# Patient Record
Sex: FEMALE | Race: BLACK OR AFRICAN AMERICAN | NOT HISPANIC OR LATINO | Employment: OTHER | URBAN - METROPOLITAN AREA
[De-identification: names, ages, dates, MRNs, and addresses within clinical notes are randomized per-mention and may not be internally consistent; named-entity substitution may affect disease eponyms.]

---

## 2017-01-23 DIAGNOSIS — M25.462 BILATERAL KNEE SWELLING: ICD-10-CM

## 2017-01-23 DIAGNOSIS — M1A.9XX0 CHRONIC GOUT WITHOUT TOPHUS, UNSPECIFIED CAUSE, UNSPECIFIED SITE: ICD-10-CM

## 2017-01-23 DIAGNOSIS — M25.461 BILATERAL KNEE SWELLING: ICD-10-CM

## 2017-01-23 RX ORDER — MELOXICAM 7.5 MG/1
TABLET ORAL
Qty: 30 TABLET | Refills: 0 | Status: SHIPPED | OUTPATIENT
Start: 2017-01-23 | End: 2017-04-19 | Stop reason: SDUPTHER

## 2017-01-23 RX ORDER — ALLOPURINOL 100 MG/1
TABLET ORAL
Qty: 30 TABLET | Refills: 0 | Status: SHIPPED | OUTPATIENT
Start: 2017-01-23 | End: 2017-02-16 | Stop reason: SDUPTHER

## 2017-02-16 ENCOUNTER — LAB VISIT (OUTPATIENT)
Dept: LAB | Facility: HOSPITAL | Age: 82
End: 2017-02-16
Attending: FAMILY MEDICINE
Payer: MEDICARE

## 2017-02-16 ENCOUNTER — OFFICE VISIT (OUTPATIENT)
Dept: INTERNAL MEDICINE | Facility: CLINIC | Age: 82
End: 2017-02-16
Payer: MEDICARE

## 2017-02-16 VITALS
DIASTOLIC BLOOD PRESSURE: 88 MMHG | HEIGHT: 64 IN | TEMPERATURE: 97 F | HEART RATE: 77 BPM | OXYGEN SATURATION: 97 % | SYSTOLIC BLOOD PRESSURE: 142 MMHG | BODY MASS INDEX: 33.88 KG/M2 | WEIGHT: 198.44 LBS

## 2017-02-16 DIAGNOSIS — E66.9 OBESITY (BMI 30.0-34.9): ICD-10-CM

## 2017-02-16 DIAGNOSIS — M1A.9XX0 CHRONIC GOUT WITHOUT TOPHUS, UNSPECIFIED CAUSE, UNSPECIFIED SITE: ICD-10-CM

## 2017-02-16 DIAGNOSIS — M17.0 PRIMARY OSTEOARTHRITIS OF BOTH KNEES: ICD-10-CM

## 2017-02-16 DIAGNOSIS — M81.0 OSTEOPOROSIS: ICD-10-CM

## 2017-02-16 DIAGNOSIS — Z85.41 HISTORY OF CERVICAL CANCER: ICD-10-CM

## 2017-02-16 DIAGNOSIS — I10 ESSENTIAL HYPERTENSION: Primary | ICD-10-CM

## 2017-02-16 DIAGNOSIS — J30.1 SEASONAL ALLERGIC RHINITIS DUE TO POLLEN: ICD-10-CM

## 2017-02-16 DIAGNOSIS — I10 ESSENTIAL HYPERTENSION: ICD-10-CM

## 2017-02-16 LAB
ALBUMIN SERPL BCP-MCNC: 3.3 G/DL
ALP SERPL-CCNC: 63 U/L
ALT SERPL W/O P-5'-P-CCNC: 19 U/L
ANION GAP SERPL CALC-SCNC: 6 MMOL/L
AST SERPL-CCNC: 28 U/L
BILIRUB SERPL-MCNC: 0.4 MG/DL
BUN SERPL-MCNC: 24 MG/DL
CALCIUM SERPL-MCNC: 9.1 MG/DL
CHLORIDE SERPL-SCNC: 107 MMOL/L
CHOLEST/HDLC SERPL: 2.6 {RATIO}
CO2 SERPL-SCNC: 29 MMOL/L
CREAT SERPL-MCNC: 1.1 MG/DL
EST. GFR  (AFRICAN AMERICAN): 53.7 ML/MIN/1.73 M^2
EST. GFR  (NON AFRICAN AMERICAN): 46.5 ML/MIN/1.73 M^2
GLUCOSE SERPL-MCNC: 112 MG/DL
HDL/CHOLESTEROL RATIO: 37.9 %
HDLC SERPL-MCNC: 182 MG/DL
HDLC SERPL-MCNC: 69 MG/DL
LDLC SERPL CALC-MCNC: 98 MG/DL
NONHDLC SERPL-MCNC: 113 MG/DL
POTASSIUM SERPL-SCNC: 3.9 MMOL/L
PROT SERPL-MCNC: 6.9 G/DL
SODIUM SERPL-SCNC: 142 MMOL/L
TRIGL SERPL-MCNC: 75 MG/DL
TSH SERPL DL<=0.005 MIU/L-ACNC: 1.31 UIU/ML
URATE SERPL-MCNC: 6 MG/DL

## 2017-02-16 PROCEDURE — 36415 COLL VENOUS BLD VENIPUNCTURE: CPT | Mod: PO

## 2017-02-16 PROCEDURE — 80061 LIPID PANEL: CPT

## 2017-02-16 PROCEDURE — 99214 OFFICE O/P EST MOD 30 MIN: CPT | Mod: S$PBB,,, | Performed by: FAMILY MEDICINE

## 2017-02-16 PROCEDURE — 80053 COMPREHEN METABOLIC PANEL: CPT

## 2017-02-16 PROCEDURE — 99999 PR PBB SHADOW E&M-EST. PATIENT-LVL III: CPT | Mod: PBBFAC,,, | Performed by: FAMILY MEDICINE

## 2017-02-16 PROCEDURE — 84443 ASSAY THYROID STIM HORMONE: CPT

## 2017-02-16 PROCEDURE — 84550 ASSAY OF BLOOD/URIC ACID: CPT

## 2017-02-16 RX ORDER — CETIRIZINE HYDROCHLORIDE 10 MG/1
10 TABLET ORAL DAILY PRN
Qty: 30 TABLET | Refills: 0 | Status: SHIPPED | OUTPATIENT
Start: 2017-02-16 | End: 2017-03-20 | Stop reason: SDUPTHER

## 2017-02-16 RX ORDER — ALLOPURINOL 100 MG/1
100 TABLET ORAL DAILY
Qty: 90 TABLET | Refills: 1 | Status: SHIPPED | OUTPATIENT
Start: 2017-02-16 | End: 2017-07-27 | Stop reason: SDUPTHER

## 2017-02-16 RX ORDER — LISINOPRIL AND HYDROCHLOROTHIAZIDE 12.5; 2 MG/1; MG/1
1 TABLET ORAL DAILY
Qty: 30 TABLET | Refills: 1 | Status: SHIPPED | OUTPATIENT
Start: 2017-02-16 | End: 2017-04-18 | Stop reason: SDUPTHER

## 2017-02-16 NOTE — PROGRESS NOTES
"Subjective:       Patient ID: Henna Jorge is a 83 y.o. female.    Chief Complaint: Medication Refill and Annual Exam    HPI Comments: 83-year-old female patient accompanied by her daughter with Patient Active Problem List:     Hypertension     Osteoporosis     Gout, chronic     Primary osteoarthritis of both knees     History of cervical cancer  Here for refill on her medications, and for follow-up on chronic medical conditions.  Patient denies of any gout exacerbations, requesting refill on allopurinol today.   Patient did not take her blood pressure medication this morning, reported that she was rushing in.   Denies of any chest pain or shortness of breath, has been having mild headache.  Secondary to osteoarthritis has been having knee stiffness.         Medication Refill   Associated symptoms include arthralgias and headaches. Pertinent negatives include no abdominal pain, chest pain, fatigue, myalgias, nausea, rash or vomiting.     Review of Systems   Constitutional: Negative for fatigue.   Eyes: Negative for visual disturbance.   Respiratory: Negative for shortness of breath.    Cardiovascular: Negative for chest pain and leg swelling.   Gastrointestinal: Negative for abdominal pain, nausea and vomiting.   Musculoskeletal: Positive for arthralgias. Negative for myalgias.   Skin: Negative for rash.   Neurological: Positive for headaches. Negative for light-headedness.   Psychiatric/Behavioral: Negative for sleep disturbance.         Visit Vitals    BP (!) 142/88    Pulse 77    Temp 97.2 °F (36.2 °C) (Tympanic)    Ht 5' 4" (1.626 m)    Wt 90 kg (198 lb 6.6 oz)    SpO2 97%    BMI 34.06 kg/m2     Objective:      Physical Exam   Constitutional: She is oriented to person, place, and time. She appears well-developed and well-nourished.   HENT:   Head: Normocephalic and atraumatic.   Mouth/Throat: Oropharynx is clear and moist.   Cardiovascular: Normal rate, regular rhythm and normal heart sounds.    No " murmur heard.  Pulmonary/Chest: Effort normal and breath sounds normal. She has no wheezes.   Abdominal: Soft. Bowel sounds are normal. There is no tenderness.   Musculoskeletal: She exhibits tenderness. She exhibits no edema.   Positive for bilateral knee tenderness more on the right side with stiffness   Neurological: She is alert and oriented to person, place, and time.   Skin: Skin is warm and dry. No rash noted.   Psychiatric: She has a normal mood and affect.         Assessment:       1. Essential hypertension    2. Chronic gout without tophus, unspecified cause, unspecified site    3. Osteoporosis    4. Primary osteoarthritis of both knees    5. History of cervical cancer    6. Seasonal allergic rhinitis due to pollen    7. Obesity (BMI 30.0-34.9)        Plan:   Essential hypertension  -     lisinopril-hydrochlorothiazide (PRINZIDE,ZESTORETIC) 20-12.5 mg per tablet; Take 1 tablet by mouth once daily.  Dispense: 30 tablet; Refill: 1  -     Comprehensive metabolic panel; Future; Expected date: 2/16/17  -     Lipid panel; Future; Expected date: 2/16/17  -     TSH; Future; Expected date: 2/16/17  Repeat blood pressure improved but still elevated, will start on lisinopril hydrochlorothiazide 20/12.5, will discontinue lisinopril 20 mg.   Follow-up in 2 weeks for blood pressure check and to discuss about test results    Chronic gout without tophus, unspecified cause, unspecified site  -     Uric acid; Future; Expected date: 2/16/17  -     allopurinol (ZYLOPRIM) 100 MG tablet; Take 1 tablet (100 mg total) by mouth once daily.  Dispense: 90 tablet; Refill: 1  Stable on allopurinol 100 mg, refill given today    Osteoporosis-currently on Fosamax and takes calcium with vitamin D supplements    Primary osteoarthritis of both knees-stable, takes meloxicam only as needed  Graded exercise regimen recommended    History of cervical cancer    Seasonal allergic rhinitis due to pollen  -     cetirizine (ZYRTEC) 10 MG tablet;  Take 1 tablet (10 mg total) by mouth daily as needed for Allergies.  Dispense: 30 tablet; Refill: 0  Refill given on Zyrtec    Obesity (BMI 30.0-34.9)-advised to work on lifestyle modifications with diet and exercise to lower weight of BMI 34

## 2017-02-16 NOTE — MR AVS SNAPSHOT
[facility] Internal Medicine  [address]  [place] [address]  Phone: [phone]  Fax: [phone]                  Henna Jorge   2017 11:20 AM   Office Visit    Description:  Female : 3/20/1933   Provider:  Mary Corbett MD   Department:  [facility] - Internal Medicine           Reason for Visit     Medication Refill     Annual Exam           Diagnoses this Visit        Comments    Essential hypertension    -  Primary     Chronic gout without tophus, unspecified cause, unspecified site         Osteoporosis         Primary osteoarthritis of both knees         History of cervical cancer         Seasonal allergic rhinitis due to pollen         Obesity (BMI 30.0-34.9)                To Do List           Future Appointments        Provider Department Dept Phone    2017 1:15 PM LAB, SAME DAY [facility] [phone]    3/2/2017 11:00 AM Mary Corbett MD [facility] Internal Medicine [phone]      Goals (5 Years of Data)     None      Follow-Up and Disposition     Return in about 2 weeks (around 3/2/2017), or if symptoms worsen or fail to improve.       These Medications        Disp Refills Start End    lisinopril-hydrochlorothiazide (PRINZIDE,ZESTORETIC) 20-12.5 mg per tablet 30 tablet 1 2017    Take 1 tablet by mouth once daily. - Oral    Pharmacy: [facility] [address] [place] [address] [address] AT [facility] Ph #: [phone]       cetirizine (ZYRTEC) 10 MG tablet 30 tablet 0 2017    Take 1 tablet (10 mg total) by mouth daily as needed for Allergies. - Oral    [facility] [address] [place] [address] [place] - [address] AT [facility] Ph #: [phone]       allopurinol (ZYLOPRIM) 100 MG tablet 90 tablet 1 2017     Take 1 tablet (100 mg total) by mouth once daily. - Oral    Pharmacy: [facility] [address] [place] [address] [place] - [address] AT [facility]  [address] Sherman  #: [phone]         [facility] On Call     [facility] On Call Nurse Care Line - 24/7 Assistance  Registered nurses in the [facility] On Call Center provide clinical advisement, health education, appointment booking, and other advisory services.  Call for this free service at [phone].             Medications           Message regarding Medications     Verify the changes and/or additions to your medication regime listed below are the same as discussed with your clinician today.  If any of these changes or additions are incorrect, please notify your healthcare provider.        START taking these NEW medications        Refills    lisinopril-hydrochlorothiazide (PRINZIDE,ZESTORETIC) 20-12.5 mg per tablet 1    Sig: Take 1 tablet by mouth once daily.    Class: Normal    Route: Oral    cetirizine (ZYRTEC) 10 MG tablet 0    Sig: Take 1 tablet (10 mg total) by mouth daily as needed for Allergies.    Class: Normal    Route: Oral      CHANGE how you are taking these medications     Start Taking Instead of    allopurinol (ZYLOPRIM) 100 MG tablet allopurinol (ZYLOPRIM) 100 MG tablet    Dosage:  Take 1 tablet (100 mg total) by mouth once daily. Dosage:  TAKE 1 TABLET BY MOUTH EVERY DAY    Reason for Change:  Reorder       STOP taking these medications     lisinopril (PRINIVIL,ZESTRIL) 20 MG tablet Take 1 tablet (20 mg total) by mouth once daily.           Verify that the below list of medications is an accurate representation of the medications you are currently taking.  If none reported, the list may be blank. If incorrect, please contact your healthcare provider. Carry this list with you in case of emergency.           Current Medications     alendronate (FOSAMAX) 70 MG tablet TAKE 1 TABLET BY MOUTH EVERY WEEK    allopurinol (ZYLOPRIM) 100 MG tablet Take 1 tablet (100 mg total) by mouth once daily.    benzoyl peroxide (BP WASH) 10 % external wash Use once daily as face wash.  Rinse completely.  May  "bleach clothing    cholecalciferol, vitamin D3, 2,000 unit Tab Take 1 tablet by mouth Daily. 1 tablet Oral Every day    dorzolamide-timolol 2-0.5% (COSOPT) 2-0.5 % ophthalmic solution     TRAVATAN Z 0.004 % Drop     cetirizine (ZYRTEC) 10 MG tablet Take 1 tablet (10 mg total) by mouth daily as needed for Allergies.    COLCRYS 0.6 mg tablet TAKE 1 TABLET BY MOUTH TWICE DAILY AS NEEDED FOR GOUT ATTACK    lisinopril-hydrochlorothiazide (PRINZIDE,ZESTORETIC) 20-12.5 mg per tablet Take 1 tablet by mouth once daily.    meloxicam (MOBIC) 7.5 MG tablet TAKE 1 TABLET BY MOUTH EVERY DAY AS NEEDED FOR PAIN           Clinical Reference Information           Your Vitals Were     BP Pulse Temp Height Weight SpO2    142/88 77 97.2 °F (36.2 °C) (Tympanic) 5' 4" (1.626 m) 90 kg (198 lb 6.6 oz) 97%    BMI                34.06 kg/m2          Blood Pressure          Most Recent Value    BP  (!)  142/88      Allergies as of 2/16/2017     No Known Allergies      Immunizations Administered on Date of Encounter - 2/16/2017     None      Orders Placed During Today's Visit     Future Labs/Procedures Expected by Expires    Comprehensive metabolic panel  2/16/2017 4/17/2018    Lipid panel  2/16/2017 4/17/2018    TSH  2/16/2017 4/17/2018    Uric acid  2/16/2017 4/17/2018      [facility] Sign-Up     Activating your [facility] account is as easy as 1-2-3!     1) Visit [email or web address], select Sign Up Now, enter this activation code and your date of birth, then select Next.  [email or web address]  Expires: 4/2/2017 12:07 PM      2) Create a username and password to use when you visit [facility] in the future and select a security question in case you lose your password and select Next.    3) Enter your e-mail address and click Sign Up!    Additional Information  If you have questions, please e-mail [email or web address] or call [phone] to talk to our [facility] staff. Remember, [facility] is NOT to be used for urgent needs. For medical emergencies, dial " 911.         Language Assistance Services     ATTENTION: Language assistance services are available, free of charge. Please call [phone].      ATENCIÓN: Si habla azam, tiene a turner disposición servicios gratuitos de asistencia lingüística. Llame al [phone].     CHÚ Ý: N?u b?n nói Ti?ng Vi?t, có các d?ch v? h? tr? ngôn ng? mi?n phí dành cho b?n. G?i s? [phone].         [facility] - Internal Medicine complies with applicable Federal civil rights laws and does not discriminate on the basis of race, color, national origin, age, disability, or sex.

## 2017-03-17 RX ORDER — ALENDRONATE SODIUM 70 MG/1
TABLET ORAL
Qty: 4 TABLET | Refills: 4 | Status: SHIPPED | OUTPATIENT
Start: 2017-03-17 | End: 2017-08-25 | Stop reason: SDUPTHER

## 2017-03-20 DIAGNOSIS — J30.1 SEASONAL ALLERGIC RHINITIS DUE TO POLLEN: ICD-10-CM

## 2017-03-20 RX ORDER — CETIRIZINE HYDROCHLORIDE 10 MG/1
TABLET ORAL
Qty: 30 TABLET | Refills: 0 | Status: SHIPPED | OUTPATIENT
Start: 2017-03-20 | End: 2017-04-19 | Stop reason: SDUPTHER

## 2017-04-18 DIAGNOSIS — I10 ESSENTIAL HYPERTENSION: ICD-10-CM

## 2017-04-19 DIAGNOSIS — M25.462 BILATERAL KNEE SWELLING: ICD-10-CM

## 2017-04-19 DIAGNOSIS — M25.461 BILATERAL KNEE SWELLING: ICD-10-CM

## 2017-04-19 DIAGNOSIS — I10 ESSENTIAL HYPERTENSION: ICD-10-CM

## 2017-04-19 DIAGNOSIS — J30.1 SEASONAL ALLERGIC RHINITIS DUE TO POLLEN: ICD-10-CM

## 2017-04-19 RX ORDER — LISINOPRIL AND HYDROCHLOROTHIAZIDE 12.5; 2 MG/1; MG/1
TABLET ORAL
Qty: 30 TABLET | Refills: 4 | Status: SHIPPED | OUTPATIENT
Start: 2017-04-19 | End: 2017-04-19 | Stop reason: SDUPTHER

## 2017-04-19 RX ORDER — CETIRIZINE HYDROCHLORIDE 10 MG/1
10 TABLET ORAL DAILY
Qty: 90 TABLET | Refills: 0 | Status: SHIPPED | OUTPATIENT
Start: 2017-04-19 | End: 2018-02-20 | Stop reason: SDUPTHER

## 2017-04-19 RX ORDER — MELOXICAM 7.5 MG/1
7.5 TABLET ORAL DAILY PRN
Qty: 90 TABLET | Refills: 3 | Status: SHIPPED | OUTPATIENT
Start: 2017-04-19 | End: 2018-02-20 | Stop reason: SDUPTHER

## 2017-04-19 RX ORDER — MELOXICAM 7.5 MG/1
TABLET ORAL
Qty: 30 TABLET | Refills: 0 | Status: SHIPPED | OUTPATIENT
Start: 2017-04-19 | End: 2017-04-19 | Stop reason: SDUPTHER

## 2017-04-19 RX ORDER — LISINOPRIL AND HYDROCHLOROTHIAZIDE 12.5; 2 MG/1; MG/1
1 TABLET ORAL DAILY
Qty: 90 TABLET | Refills: 4 | Status: SHIPPED | OUTPATIENT
Start: 2017-04-19 | End: 2018-02-20 | Stop reason: SDUPTHER

## 2017-04-19 RX ORDER — CETIRIZINE HYDROCHLORIDE 10 MG/1
TABLET ORAL
Qty: 30 TABLET | Refills: 0 | Status: SHIPPED | OUTPATIENT
Start: 2017-04-19 | End: 2017-04-19 | Stop reason: SDUPTHER

## 2017-04-19 NOTE — TELEPHONE ENCOUNTER
----- Message from Holley Ledezma sent at 4/19/2017 12:12 PM CDT -----  Contact: daughter/trina Corona states that pt need a refill on CETRIZINE, MELOXICAM, & LISINIPOL . Caller also  states that she would like to get a 90 day supply. .. [phone]        .  [facility] [address] [place] [address] [place] - [address] [facility] [place] [address] [place]  [facility] [place] [address]  Phone: [phone] Fax: [phone]

## 2017-07-27 DIAGNOSIS — M1A.9XX0 CHRONIC GOUT WITHOUT TOPHUS, UNSPECIFIED CAUSE, UNSPECIFIED SITE: ICD-10-CM

## 2017-07-27 RX ORDER — ALLOPURINOL 100 MG/1
TABLET ORAL
Qty: 90 TABLET | Refills: 0 | Status: SHIPPED | OUTPATIENT
Start: 2017-07-27 | End: 2017-11-02 | Stop reason: SDUPTHER

## 2017-08-25 RX ORDER — ALENDRONATE SODIUM 70 MG/1
TABLET ORAL
Qty: 4 TABLET | Refills: 5 | Status: SHIPPED | OUTPATIENT
Start: 2017-08-25 | End: 2018-02-20 | Stop reason: SDUPTHER

## 2017-11-02 DIAGNOSIS — M1A.9XX0 CHRONIC GOUT WITHOUT TOPHUS, UNSPECIFIED CAUSE, UNSPECIFIED SITE: ICD-10-CM

## 2017-11-02 RX ORDER — ALLOPURINOL 100 MG/1
TABLET ORAL
Qty: 90 TABLET | Refills: 0 | Status: SHIPPED | OUTPATIENT
Start: 2017-11-02 | End: 2018-01-29 | Stop reason: SDUPTHER

## 2018-01-29 DIAGNOSIS — M1A.9XX0 CHRONIC GOUT WITHOUT TOPHUS, UNSPECIFIED CAUSE, UNSPECIFIED SITE: ICD-10-CM

## 2018-01-29 RX ORDER — ALLOPURINOL 100 MG/1
TABLET ORAL
Qty: 30 TABLET | Refills: 0 | Status: SHIPPED | OUTPATIENT
Start: 2018-01-29 | End: 2018-02-20 | Stop reason: SDUPTHER

## 2018-02-20 ENCOUNTER — OFFICE VISIT (OUTPATIENT)
Dept: INTERNAL MEDICINE | Facility: CLINIC | Age: 83
End: 2018-02-20
Payer: MEDICARE

## 2018-02-20 ENCOUNTER — LAB VISIT (OUTPATIENT)
Dept: LAB | Facility: HOSPITAL | Age: 83
End: 2018-02-20
Attending: FAMILY MEDICINE
Payer: MEDICARE

## 2018-02-20 VITALS
BODY MASS INDEX: 33.91 KG/M2 | DIASTOLIC BLOOD PRESSURE: 76 MMHG | HEART RATE: 69 BPM | TEMPERATURE: 98 F | OXYGEN SATURATION: 98 % | WEIGHT: 198.63 LBS | SYSTOLIC BLOOD PRESSURE: 130 MMHG | HEIGHT: 64 IN

## 2018-02-20 DIAGNOSIS — I10 ESSENTIAL HYPERTENSION: ICD-10-CM

## 2018-02-20 DIAGNOSIS — I10 ESSENTIAL HYPERTENSION: Primary | ICD-10-CM

## 2018-02-20 DIAGNOSIS — N18.30 STAGE 3 CHRONIC KIDNEY DISEASE: ICD-10-CM

## 2018-02-20 DIAGNOSIS — M1A.00X0 IDIOPATHIC CHRONIC GOUT WITHOUT TOPHUS, UNSPECIFIED SITE: ICD-10-CM

## 2018-02-20 DIAGNOSIS — M17.0 PRIMARY OSTEOARTHRITIS OF BOTH KNEES: ICD-10-CM

## 2018-02-20 DIAGNOSIS — M81.0 OSTEOPOROSIS, UNSPECIFIED OSTEOPOROSIS TYPE, UNSPECIFIED PATHOLOGICAL FRACTURE PRESENCE: ICD-10-CM

## 2018-02-20 DIAGNOSIS — L60.0 INGROWN TOENAIL: ICD-10-CM

## 2018-02-20 DIAGNOSIS — Z85.41 HISTORY OF CERVICAL CANCER: ICD-10-CM

## 2018-02-20 DIAGNOSIS — B35.1 ONYCHOMYCOSIS: ICD-10-CM

## 2018-02-20 DIAGNOSIS — E55.9 VITAMIN D DEFICIENCY: Primary | ICD-10-CM

## 2018-02-20 DIAGNOSIS — E66.9 OBESITY (BMI 30.0-34.9): ICD-10-CM

## 2018-02-20 DIAGNOSIS — J30.1 CHRONIC SEASONAL ALLERGIC RHINITIS DUE TO POLLEN: ICD-10-CM

## 2018-02-20 PROBLEM — E66.811 OBESITY (BMI 30.0-34.9): Status: ACTIVE | Noted: 2018-02-20

## 2018-02-20 LAB
25(OH)D3+25(OH)D2 SERPL-MCNC: 24 NG/ML
ALBUMIN SERPL BCP-MCNC: 3.2 G/DL
ALP SERPL-CCNC: 63 U/L
ALT SERPL W/O P-5'-P-CCNC: 13 U/L
ANION GAP SERPL CALC-SCNC: 12 MMOL/L
AST SERPL-CCNC: 20 U/L
BASOPHILS # BLD AUTO: 0.03 K/UL
BASOPHILS NFR BLD: 0.4 %
BILIRUB SERPL-MCNC: 0.4 MG/DL
BUN SERPL-MCNC: 26 MG/DL
CALCIUM SERPL-MCNC: 9 MG/DL
CHLORIDE SERPL-SCNC: 109 MMOL/L
CHOLEST SERPL-MCNC: 190 MG/DL
CHOLEST/HDLC SERPL: 2.6 {RATIO}
CO2 SERPL-SCNC: 23 MMOL/L
CREAT SERPL-MCNC: 1.1 MG/DL
DIFFERENTIAL METHOD: ABNORMAL
EOSINOPHIL # BLD AUTO: 0.1 K/UL
EOSINOPHIL NFR BLD: 0.7 %
ERYTHROCYTE [DISTWIDTH] IN BLOOD BY AUTOMATED COUNT: 15.3 %
EST. GFR  (AFRICAN AMERICAN): 53.3 ML/MIN/1.73 M^2
EST. GFR  (NON AFRICAN AMERICAN): 46.2 ML/MIN/1.73 M^2
GLUCOSE SERPL-MCNC: 96 MG/DL
HCT VFR BLD AUTO: 37.7 %
HDLC SERPL-MCNC: 72 MG/DL
HDLC SERPL: 37.9 %
HGB BLD-MCNC: 12.2 G/DL
IMM GRANULOCYTES # BLD AUTO: 0.01 K/UL
IMM GRANULOCYTES NFR BLD AUTO: 0.1 %
LDLC SERPL CALC-MCNC: 103.8 MG/DL
LYMPHOCYTES # BLD AUTO: 2.2 K/UL
LYMPHOCYTES NFR BLD: 30.7 %
MCH RBC QN AUTO: 27.3 PG
MCHC RBC AUTO-ENTMCNC: 32.4 G/DL
MCV RBC AUTO: 84 FL
MONOCYTES # BLD AUTO: 0.6 K/UL
MONOCYTES NFR BLD: 8 %
NEUTROPHILS # BLD AUTO: 4.3 K/UL
NEUTROPHILS NFR BLD: 60.1 %
NONHDLC SERPL-MCNC: 118 MG/DL
NRBC BLD-RTO: 0 /100 WBC
PLATELET # BLD AUTO: 234 K/UL
PMV BLD AUTO: 10.9 FL
POTASSIUM SERPL-SCNC: 3.7 MMOL/L
PROT SERPL-MCNC: 6.9 G/DL
RBC # BLD AUTO: 4.47 M/UL
SODIUM SERPL-SCNC: 144 MMOL/L
TRIGL SERPL-MCNC: 71 MG/DL
TSH SERPL DL<=0.005 MIU/L-ACNC: 2.08 UIU/ML
URATE SERPL-MCNC: 7.3 MG/DL
WBC # BLD AUTO: 7.11 K/UL

## 2018-02-20 PROCEDURE — 84443 ASSAY THYROID STIM HORMONE: CPT

## 2018-02-20 PROCEDURE — 1159F MED LIST DOCD IN RCRD: CPT | Mod: ,,, | Performed by: FAMILY MEDICINE

## 2018-02-20 PROCEDURE — 36415 COLL VENOUS BLD VENIPUNCTURE: CPT | Mod: PO

## 2018-02-20 PROCEDURE — 1125F AMNT PAIN NOTED PAIN PRSNT: CPT | Mod: ,,, | Performed by: FAMILY MEDICINE

## 2018-02-20 PROCEDURE — 82306 VITAMIN D 25 HYDROXY: CPT

## 2018-02-20 PROCEDURE — 80053 COMPREHEN METABOLIC PANEL: CPT

## 2018-02-20 PROCEDURE — 99214 OFFICE O/P EST MOD 30 MIN: CPT | Mod: PBBFAC,PO | Performed by: FAMILY MEDICINE

## 2018-02-20 PROCEDURE — 80061 LIPID PANEL: CPT

## 2018-02-20 PROCEDURE — 99999 PR PBB SHADOW E&M-EST. PATIENT-LVL IV: CPT | Mod: PBBFAC,,, | Performed by: FAMILY MEDICINE

## 2018-02-20 PROCEDURE — 84550 ASSAY OF BLOOD/URIC ACID: CPT

## 2018-02-20 PROCEDURE — 85025 COMPLETE CBC W/AUTO DIFF WBC: CPT

## 2018-02-20 PROCEDURE — 99214 OFFICE O/P EST MOD 30 MIN: CPT | Mod: S$PBB,,, | Performed by: FAMILY MEDICINE

## 2018-02-20 RX ORDER — LISINOPRIL AND HYDROCHLOROTHIAZIDE 12.5; 2 MG/1; MG/1
1 TABLET ORAL DAILY
Qty: 90 TABLET | Refills: 4 | Status: SHIPPED | OUTPATIENT
Start: 2018-02-20 | End: 2019-03-09 | Stop reason: SDUPTHER

## 2018-02-20 RX ORDER — CETIRIZINE HYDROCHLORIDE 10 MG/1
10 TABLET ORAL DAILY
Qty: 90 TABLET | Refills: 0 | Status: SHIPPED | OUTPATIENT
Start: 2018-02-20 | End: 2018-04-30 | Stop reason: SDUPTHER

## 2018-02-20 RX ORDER — ALENDRONATE SODIUM 70 MG/1
70 TABLET ORAL
Qty: 4 TABLET | Refills: 11 | Status: SHIPPED | OUTPATIENT
Start: 2018-02-20 | End: 2019-03-09 | Stop reason: SDUPTHER

## 2018-02-20 RX ORDER — ERGOCALCIFEROL 1.25 MG/1
50000 CAPSULE ORAL
Qty: 10 CAPSULE | Refills: 0 | Status: SHIPPED | OUTPATIENT
Start: 2018-02-20 | End: 2018-04-30 | Stop reason: SDUPTHER

## 2018-02-20 RX ORDER — MELOXICAM 7.5 MG/1
7.5 TABLET ORAL DAILY PRN
Qty: 90 TABLET | Refills: 0 | Status: SHIPPED | OUTPATIENT
Start: 2018-02-20 | End: 2018-07-26 | Stop reason: SDUPTHER

## 2018-02-20 RX ORDER — ALLOPURINOL 100 MG/1
TABLET ORAL
Qty: 90 TABLET | Refills: 3 | Status: SHIPPED | OUTPATIENT
Start: 2018-02-20 | End: 2019-06-20 | Stop reason: SDUPTHER

## 2018-02-20 NOTE — PROGRESS NOTES
"Subjective:       Patient ID: Henna Jorge is a 84 y.o. female.    Chief Complaint: Follow-up (ingrown toenail)    84-year-old Afro-American female patient accompanied by her daughter with Patient  Active Problem List:     Hypertension     Osteoporosis     Gout, chronic     Primary osteoarthritis of both knees     History of cervical cancer     Obesity (BMI 30.0-34.9)     Stage 3 chronic kidney disease  Here for follow-up on chronic medical conditions.  Has been taking her medications regularly, requesting refills on her medicines.  Denies of any gout exacerbations.  Reports that she does not take Mobic regularly.   Has been having ingrown toenails and would like to see the specialist, not able to cut her toenails secondary to thickening.  Denies of any chest pain or headaches, vision disturbances.         Review of Systems   Constitutional: Negative for fatigue.   Eyes: Negative for visual disturbance.   Respiratory: Negative for shortness of breath.    Cardiovascular: Negative for chest pain and leg swelling.   Gastrointestinal: Negative for abdominal pain, nausea and vomiting.   Musculoskeletal: Negative for myalgias.   Skin: Positive for rash.   Neurological: Negative for light-headedness and headaches.   Psychiatric/Behavioral: Negative for sleep disturbance.         /76 (BP Location: Right arm, Patient Position: Sitting)   Pulse 69   Temp 97.6 °F (36.4 °C) (Tympanic)   Ht 5' 4" (1.626 m)   Wt 90.1 kg (198 lb 10.2 oz)   SpO2 98%   BMI 34.10 kg/m²   Objective:      Physical Exam   Constitutional: She is oriented to person, place, and time. She appears well-developed and well-nourished.   HENT:   Head: Normocephalic and atraumatic.   Mouth/Throat: Oropharynx is clear and moist.   Cardiovascular: Normal rate, regular rhythm and normal heart sounds.    No murmur heard.  Pulmonary/Chest: Effort normal and breath sounds normal. She has no wheezes.   Abdominal: Soft. Bowel sounds are normal. There is no " tenderness.   Musculoskeletal: She exhibits no edema.   Neurological: She is alert and oriented to person, place, and time.   Skin: Skin is warm and dry. Rash noted.   Positive for fungal infection to multiple toenails bilaterally, and bilateral ingrown toenails to the great toenails   Psychiatric: She has a normal mood and affect.         Assessment:       1. Essential hypertension    2. Idiopathic chronic gout without tophus, unspecified site    3. Primary osteoarthritis of both knees    4. History of cervical cancer    5. Osteoporosis, unspecified osteoporosis type, unspecified pathological fracture presence    6. Obesity (BMI 30.0-34.9)    7. Stage 3 chronic kidney disease    8. Onychomycosis    9. Ingrown toenail    10. Chronic seasonal allergic rhinitis due to pollen        Plan:   Essential hypertension  -     Comprehensive metabolic panel; Future; Expected date: 02/20/2018  -     Lipid panel; Future; Expected date: 02/20/2018  -     TSH; Future; Expected date: 02/20/2018  -     Urinalysis; Future; Expected date: 02/20/2018  -     Vitamin D; Future; Expected date: 02/20/2018  -     lisinopril-hydrochlorothiazide (PRINZIDE,ZESTORETIC) 20-12.5 mg per tablet; Take 1 tablet by mouth once daily.  Dispense: 90 tablet; Refill: 4  Blood pressure stable today currently on lisinopril hydrochlorothiazide 20/12.5 mg daily  Restrict salt intake and eat low-fat and low-cholesterol diet    Idiopathic chronic gout without tophus, unspecified site  -     Uric acid; Future; Expected date: 02/20/2018  -     allopurinol (ZYLOPRIM) 100 MG tablet; TAKE 1 TABLET(100 MG) BY MOUTH EVERY DAY  Dispense: 90 tablet; Refill: 3  Stable on allopurinol 100 mg daily, has been taking Colcrys for exacerbations as needed    Primary osteoarthritis of both knees  -     meloxicam (MOBIC) 7.5 MG tablet; Take 1 tablet (7.5 mg total) by mouth daily as needed.  Dispense: 90 tablet; Refill: 0  Encouraged to use Mobic only as needed    History of  cervical cancer-status post complete hysterectomy    Osteoporosis, unspecified osteoporosis type, unspecified pathological fracture presence  -     Vitamin D; Future; Expected date: 02/20/2018  -     alendronate (FOSAMAX) 70 MG tablet; Take 1 tablet (70 mg total) by mouth every 7 days.  Dispense: 4 tablet; Refill: 11  -     DXA Bone Density Spine And Hip; Future; Expected date: 07/20/2018  Patient currently taking Fosamax weekly and calcium with vitamin D supplements daily  Due for DEXA scan in July    Obesity (BMI 30.0-34.9)-lifestyle modifications recommended to lose weight with BMI 34    Stage 3 chronic kidney disease  -     CBC auto differential; Future; Expected date: 02/20/2018  Encouraged to drink adequate fluids and avoid over-the-counter NSAIDs    Onychomycosis  -     Ambulatory referral to Podiatry  Ingrown toenail  -     Ambulatory referral to Podiatry  Will refer to podiatry  Advised to use Vicks cosmo/ over-the-counter Tinactin cream/Lamisil as needed for fungal rash    Chronic seasonal allergic rhinitis due to pollen  -     cetirizine (ZYRTEC) 10 MG tablet; Take 1 tablet (10 mg total) by mouth once daily.  Dispense: 90 tablet; Refill: 0  Refill given on Zyrtec

## 2018-03-01 ENCOUNTER — TELEPHONE (OUTPATIENT)
Dept: INTERNAL MEDICINE | Facility: CLINIC | Age: 83
End: 2018-03-01

## 2018-03-06 ENCOUNTER — OFFICE VISIT (OUTPATIENT)
Dept: PODIATRY | Facility: CLINIC | Age: 83
End: 2018-03-06
Payer: MEDICARE

## 2018-03-06 VITALS
DIASTOLIC BLOOD PRESSURE: 86 MMHG | WEIGHT: 194.44 LBS | SYSTOLIC BLOOD PRESSURE: 136 MMHG | BODY MASS INDEX: 33.2 KG/M2 | HEART RATE: 91 BPM | HEIGHT: 64 IN

## 2018-03-06 DIAGNOSIS — L60.0 ONYCHOCRYPTOSIS: Primary | ICD-10-CM

## 2018-03-06 DIAGNOSIS — B35.1 DERMATOPHYTOSIS OF NAIL: ICD-10-CM

## 2018-03-06 PROCEDURE — 99999 PR PBB SHADOW E&M-EST. PATIENT-LVL III: CPT | Mod: PBBFAC,,, | Performed by: PODIATRIST

## 2018-03-06 PROCEDURE — 99213 OFFICE O/P EST LOW 20 MIN: CPT | Mod: PBBFAC,PO | Performed by: PODIATRIST

## 2018-03-06 PROCEDURE — 99203 OFFICE O/P NEW LOW 30 MIN: CPT | Mod: S$PBB,,, | Performed by: PODIATRIST

## 2018-03-06 NOTE — LETTER
March 6, 2018      Mary Corbett MD  [address] [place] [address]           [facility] - Podiatry  [address] [place] [address]  Phone: [phone]  Fax: [phone]          Patient: Henna Jorge   MR Number: [id number]   YOB: 1933   Date of Visit: 3/6/2018       Dear Dr. Mary Corbett:    Thank you for referring Henna Jorge to me for evaluation. Attached you will find relevant portions of my assessment and plan of care.    If you have questions, please do not hesitate to call me. I look forward to following Henna Jorge along with you.    Sincerely,    Marika Bean, RYAN    Enclosure  CC:  No Recipients    If you would like to receive this communication electronically, please contact [email or web address] or [phone] to request more information on [employer] access.    For providers and/or their staff who would like to refer a patient to [facility], please contact us through our one-stop-shop provider referral line, [facility] , at [phone].    If you feel you have received this communication in error or would no longer like to receive these types of communications, please e-mail [email or web address]

## 2018-03-06 NOTE — PROGRESS NOTES
Podiatry Initial Consultation  Requesting Consult Provider:   PCP: Dr. Mary Corbett MD    CHIEF COMPLAINT   Chief Complaint   Patient presents with    Nail Problem     Left hallux medial border tender to touch.         HPI:    Henna Jorge is a 84 y.o. female presenting to podiatry clinic with complaint of fungal infection on toenails. The patient has tried over the counter medications with some success, but the problem is recurrent and aggravating. She also complains about painful ingrown toenails affecting both great toenails. Patient inquires about available treatment options. No further pedal complaints.      PMH  Past Medical History:   Diagnosis Date    Arthritis     Cancer     hx of cervical    Gout, chronic     Hypertension     Osteopenia        PROBLEM LIST  Patient Active Problem List    Diagnosis Date Noted    Obesity (BMI 30.0-34.9) 02/20/2018    Stage 3 chronic kidney disease 02/20/2018    History of cervical cancer 02/16/2017    Primary osteoarthritis of both knees 06/09/2016    Osteoporosis     Gout, chronic     Hypertension        MEDS  Current Outpatient Prescriptions on File Prior to Visit   Medication Sig Dispense Refill    alendronate (FOSAMAX) 70 MG tablet Take 1 tablet (70 mg total) by mouth every 7 days. 4 tablet 11    allopurinol (ZYLOPRIM) 100 MG tablet TAKE 1 TABLET(100 MG) BY MOUTH EVERY DAY 90 tablet 3    cetirizine (ZYRTEC) 10 MG tablet Take 1 tablet (10 mg total) by mouth once daily. 90 tablet 0    COLCRYS 0.6 mg tablet TAKE 1 TABLET BY MOUTH TWICE DAILY AS NEEDED FOR GOUT ATTACK 15 tablet 0    dorzolamide-timolol 2-0.5% (COSOPT) 2-0.5 % ophthalmic solution       ergocalciferol (ERGOCALCIFEROL) 50,000 unit Cap Take 1 capsule (50,000 Units total) by mouth every 7 days. 10 capsule 0    lisinopril-hydrochlorothiazide (PRINZIDE,ZESTORETIC) 20-12.5 mg per tablet Take 1 tablet by mouth once daily. 90 tablet 4    meloxicam (MOBIC) 7.5 MG tablet Take 1 tablet (7.5 mg  "total) by mouth daily as needed. 90 tablet 0    TRAVATAN Z 0.004 % Drop       benzoyl peroxide (BP WASH) 10 % external wash Use once daily as face wash.  Rinse completely.  May bleach clothing 227 g 12     No current facility-administered medications on file prior to visit.        PSH     Past Surgical History:   Procedure Laterality Date    EYE SURGERY      glaucoma    TONSILLECTOMY      TOTAL ABDOMINAL HYSTERECTOMY          ALL  Review of patient's allergies indicates:  No Known Allergies    SOC     Social History   Substance Use Topics    Smoking status: Former Smoker    Smokeless tobacco: Never Used    Alcohol use No         Family HX    Family History   Problem Relation Age of Onset    Hypertension Mother     Melanoma Neg Hx             REVIEW OF SYSTEMS  General: Denies any fever or chills  Chest: Denies shortness of breath, wheezing, coughing, or sputum production  Heart: Denies chest pain, cold extremities, orthopenia, or reduced exercise tolerance  As noted above and per history of current illness above, otherwise negative in the remainder of the 14 systems.      PHYSICAL EXAM:      Vitals:    03/06/18 1415   BP: 136/86   Pulse: 91   Weight: 88.2 kg (194 lb 7.1 oz)   Height: 5' 4" (1.626 m)   PainSc: 0-No pain       General: This patient is well-developed, well-nourished and appears stated age, well-oriented to person, place and time, and cooperative and pleasant on today's visit      LOWER EXTREMITY  Vascular:   · Palpable DP/PT pulses b/l  · Skin temperature warm to warm from prox to distally  · CFT <5 secs b/l  · There is no edema noted b/l    Dermatologic:   · Nails 1-5 bilateral thickened, elongated, dystrophic, with subungual debris  · Incurvated b/l hallux b/l medial border without acute SOI  · No open skin lesions noted  · No erythema or drainage noted b/l  · Webspaces are C/D/I B/L  · There is no hyperkeratotic tissue noted.   · Skin texture and turgor WNL    Neurologic:  · epicritic " sensation grossly intact b/l   · Light touch and sharp/dull sensation intact b/l  · Achilles and patellar deep tendon reflexes intact  · Babinski reflex absent b/l    Musculoskeletal/Orthopedic:  · No symptomatic structural abnormalities noted.   · Muscle strength AT/EHL/EDL/PT: 5/5; Achilles/Gastroc/Soleus: 5/5; PB/PL: 5/5 Muscle tone is normal.  · Ankle joint ROM  B/L limited DF/PF, non-crepitus  · STJ ROM limited inv/ev, non crepitus   · MTPJ b/l limited DF/PF, non crepitus    ASSESSMENT   Onychocryptosis    Dermatophytosis of nail        PLAN    1. Patient was educated about clinical and imaging findings, and verbalizes understanding of above.  2. I Discussed treatment options for nail fungus.   -I explained that fungus lives in a warm dark moist environment and therefore patient should make every attempt to keep feet clean and dry.  We discussed drying feet thoroughly after shower particularly between the toes and then applying powder between the toes and in the shoes. I also discussed to disinfect shower tub, discard old shoes, and disinfect current shoes with antiseptic  - I recommend vicks vapor rub for fungal toenails.  As a courtesy, With patient's permission, nails were aggressively reduced and debrided x 10 to their soft tissue attachment mechanically and with electric , removing all offending nail and debris. Patient relates relief following the procedure. PROC B for future appointments    Utilizing sterile toenail clippers I aggressively trimmed the offending nail border b/l hallux approximately 3 mm from its edge and carried the nail plate incision down at an angle in order to wedge out the offending cryptotic portion of the nail plate. The offending border was then removed in toto. The remaining nail was grinded down with an electric  down to nail bed. Minimal blood was drawn. Applied betadine and covered with band-aid. Patient tolerated the procedure well and related significant  relief.      Report Electronically Signed By:  Marika Bean DPM   Podiatric Medicine & Surgery  [facility]  3/6/2018  2:36 PM

## 2018-04-30 DIAGNOSIS — E55.9 VITAMIN D DEFICIENCY: ICD-10-CM

## 2018-04-30 DIAGNOSIS — J30.1 CHRONIC SEASONAL ALLERGIC RHINITIS DUE TO POLLEN: ICD-10-CM

## 2018-04-30 RX ORDER — CETIRIZINE HYDROCHLORIDE 10 MG/1
TABLET ORAL
Qty: 90 TABLET | Refills: 0 | Status: SHIPPED | OUTPATIENT
Start: 2018-04-30 | End: 2019-03-09 | Stop reason: SDUPTHER

## 2018-04-30 RX ORDER — ERGOCALCIFEROL 1.25 MG/1
CAPSULE ORAL
Qty: 10 CAPSULE | Refills: 0 | Status: SHIPPED | OUTPATIENT
Start: 2018-04-30 | End: 2018-07-26 | Stop reason: ALTCHOICE

## 2018-07-26 ENCOUNTER — HOSPITAL ENCOUNTER (OUTPATIENT)
Dept: RADIOLOGY | Facility: HOSPITAL | Age: 83
Discharge: HOME | End: 2018-07-26
Attending: FAMILY MEDICINE
Payer: MEDICARE

## 2018-07-26 ENCOUNTER — OFFICE VISIT (OUTPATIENT)
Dept: INTERNAL MEDICINE | Facility: CLINIC | Age: 83
End: 2018-07-26
Payer: MEDICARE

## 2018-07-26 VITALS
TEMPERATURE: 96 F | HEART RATE: 60 BPM | DIASTOLIC BLOOD PRESSURE: 76 MMHG | OXYGEN SATURATION: 99 % | WEIGHT: 198.44 LBS | BODY MASS INDEX: 33.88 KG/M2 | HEIGHT: 64 IN | SYSTOLIC BLOOD PRESSURE: 120 MMHG

## 2018-07-26 DIAGNOSIS — E66.9 OBESITY (BMI 30.0-34.9): ICD-10-CM

## 2018-07-26 DIAGNOSIS — I10 ESSENTIAL HYPERTENSION: ICD-10-CM

## 2018-07-26 DIAGNOSIS — M1A.00X0 IDIOPATHIC CHRONIC GOUT WITHOUT TOPHUS, UNSPECIFIED SITE: ICD-10-CM

## 2018-07-26 DIAGNOSIS — M89.9 BONE DISORDER: ICD-10-CM

## 2018-07-26 DIAGNOSIS — M81.0 OSTEOPOROSIS, UNSPECIFIED OSTEOPOROSIS TYPE, UNSPECIFIED PATHOLOGICAL FRACTURE PRESENCE: ICD-10-CM

## 2018-07-26 DIAGNOSIS — E55.9 VITAMIN D DEFICIENCY: ICD-10-CM

## 2018-07-26 DIAGNOSIS — M25.552 ACUTE HIP PAIN, LEFT: Primary | ICD-10-CM

## 2018-07-26 DIAGNOSIS — N18.30 STAGE 3 CHRONIC KIDNEY DISEASE: ICD-10-CM

## 2018-07-26 DIAGNOSIS — M17.0 PRIMARY OSTEOARTHRITIS OF BOTH KNEES: ICD-10-CM

## 2018-07-26 DIAGNOSIS — M25.552 ACUTE HIP PAIN, LEFT: ICD-10-CM

## 2018-07-26 PROCEDURE — 99214 OFFICE O/P EST MOD 30 MIN: CPT | Mod: S$PBB,,, | Performed by: FAMILY MEDICINE

## 2018-07-26 PROCEDURE — 73502 X-RAY EXAM HIP UNI 2-3 VIEWS: CPT | Mod: TC,FY,PO,LT

## 2018-07-26 PROCEDURE — 99999 PR PBB SHADOW E&M-EST. PATIENT-LVL III: CPT | Mod: PBBFAC,,, | Performed by: FAMILY MEDICINE

## 2018-07-26 PROCEDURE — 73502 X-RAY EXAM HIP UNI 2-3 VIEWS: CPT | Mod: 26,LT,, | Performed by: RADIOLOGY

## 2018-07-26 PROCEDURE — 99213 OFFICE O/P EST LOW 20 MIN: CPT | Mod: PBBFAC,PO,25 | Performed by: FAMILY MEDICINE

## 2018-07-26 RX ORDER — MELOXICAM 7.5 MG/1
7.5 TABLET ORAL DAILY PRN
Qty: 30 TABLET | Refills: 0 | Status: SHIPPED | OUTPATIENT
Start: 2018-07-26 | End: 2019-09-20 | Stop reason: SDUPTHER

## 2018-07-26 NOTE — PROGRESS NOTES
Subjective:       Patient ID: Henna Jorge is a 85 y.o. female.    Chief Complaint: Back Pain and Leg Pain (left leg)    85-year-old  female patient accompanied by her daughter with Patient Active Problem List:     Hypertension     Osteoporosis     Gout, chronic     Primary osteoarthritis of both knees     History of cervical cancer     Obesity (BMI 30.0-34.9)     Stage 3 chronic kidney disease  Here with complaint of left hip pain off and on for the past 1 week radiating to the left thigh, occasionally reports right hip pain but has been worse in the left hip.  Denies any significant low back pain.  Patient has been taking meloxicam as needed secondary to arthritis in the knees but has not been having any bothersome complaints at this time with her knees  Patient reports that she feels Advil works better for her than meloxicam and takes it as needed but not daily  Has been taking over-the-counter vitamin D3 1000 units daily for low vitamin D  Patient denies any gout exacerbations recently  Denies any injury or trauma to cause this  Pain, denies any tingling or numbness sensation to lower extremities  Reports pain getting worse with changing positions from sitting to standing or walking for prolonged periods of time, reports pain as 7 to 8/10 intermittently      Review of Systems   Constitutional: Negative for fatigue and fever.   Eyes: Negative for visual disturbance.   Respiratory: Negative for shortness of breath.    Cardiovascular: Negative for chest pain and leg swelling.   Gastrointestinal: Negative for abdominal pain, constipation, nausea and vomiting.   Genitourinary: Negative for dysuria.   Musculoskeletal: Positive for arthralgias and myalgias. Negative for back pain and gait problem.   Skin: Negative for rash.   Neurological: Negative for weakness, light-headedness, numbness and headaches.   Psychiatric/Behavioral: Negative for sleep disturbance.         /76 (BP Location: Right  "arm, Patient Position: Sitting)   Pulse 60   Temp 96.4 °F (35.8 °C) (Tympanic)   Ht 5' 4" (1.626 m)   Wt 90 kg (198 lb 6.6 oz)   SpO2 99%   BMI 34.06 kg/m²   Objective:      Physical Exam   Constitutional: She is oriented to person, place, and time. She appears well-developed and well-nourished.   HENT:   Head: Normocephalic and atraumatic.   Mouth/Throat: Oropharynx is clear and moist.   Cardiovascular: Normal rate, regular rhythm and normal heart sounds.    No murmur heard.  Pulmonary/Chest: Effort normal and breath sounds normal. She has no wheezes.   Abdominal: Soft. Bowel sounds are normal. There is no tenderness.   Musculoskeletal: She exhibits tenderness. She exhibits no edema.   Positive for tenderness to the left hip on palpation but no restricted range of motion noted with abduction or adduction  No paraspinal lumbar muscle tenderness noted on exam today   Neurological: She is alert and oriented to person, place, and time.   Skin: Skin is warm and dry. No rash noted. No erythema.   Psychiatric: She has a normal mood and affect.         Assessment:       1. Acute hip pain, left    2. Essential hypertension    3. Primary osteoarthritis of both knees    4. Stage 3 chronic kidney disease    5. Obesity (BMI 30.0-34.9)    6. Osteoporosis, unspecified osteoporosis type, unspecified pathological fracture presence    7. Idiopathic chronic gout without tophus, unspecified site    8. Vitamin D deficiency    9. Bone disorder        Plan:   Acute hip pain, left  -     X-Ray Hip 2 View Left; Future; Expected date: 07/26/2018  -     meloxicam (MOBIC) 7.5 MG tablet; Take 1 tablet (7.5 mg total) by mouth daily as needed.  Dispense: 30 tablet; Refill: 0  -     CBC auto differential; Future; Expected date: 07/26/2018   Primary osteoarthritis of both knees    Refill will be given on meloxicam likely secondary to arthritis with underlying history of arthritis to bilateral knees  Will get x-ray of the left hip to look " into further etiology  Patient was advised to try taking turmeric supplements if needed and avoid taking NSAIDs daily secondary to chronic kidney disease  Graded exercise regimen recommended  Can try over-the-counter topical anti inflammatory creams like Aspercreme    Essential hypertension  -     Basic metabolic panel; Future; Expected date: 07/26/2018  Blood pressure is stable today currently on lisinopril hydrochlorothiazide 20/12.5 mg daily    Stage 3 chronic kidney disease  -     Basic metabolic panel; Future; Expected date: 07/26/2018  Will check labs today encouraged to drink adequate fluids and avoid over-the-counter NSAIDs    Obesity (BMI 30.0-34.9)-Lifestyle modifications recommended to lose weight with BMI 34    Osteoporosis, unspecified osteoporosis type, unspecified pathological fracture presence-currently taking Fosamax 70 mg weekly and vitamin D3  1000 units daily will check DEXA scan as patient is due     Idiopathic chronic gout without tophus, unspecified site  -     Uric acid; Future; Expected date: 07/26/2018  Stable on allopurinol 100 mg daily and colchicine as needed    Vitamin D deficiency  -     Vitamin D; Future; Expected date: 07/26/2018  Taking vitamin-D over-the-counter 1000 units daily    Bone disorder

## 2018-07-27 DIAGNOSIS — E55.9 VITAMIN D DEFICIENCY: Primary | ICD-10-CM

## 2018-07-27 RX ORDER — ERGOCALCIFEROL 1.25 MG/1
50000 CAPSULE ORAL
Qty: 10 CAPSULE | Refills: 0 | Status: SHIPPED | OUTPATIENT
Start: 2018-07-27 | End: 2018-10-31 | Stop reason: SDUPTHER

## 2018-08-02 ENCOUNTER — TELEPHONE (OUTPATIENT)
Dept: INTERNAL MEDICINE | Facility: CLINIC | Age: 83
End: 2018-08-02

## 2018-08-02 NOTE — TELEPHONE ENCOUNTER
----- Message from Ele English sent at 8/2/2018  9:21 AM CDT -----  Contact: Daughter  Request a call concerning this pt, no additional info given, can be reached at [phone]///[email or web address]

## 2018-08-02 NOTE — TELEPHONE ENCOUNTER
Spoke with pt's daughter regarding labs, advised that vitamin D levels were low and that Dr. Corbett sent and Rx for vitamin D to her pharmacy. Advised that pt should drink 6 to 8 glasses of water a day and to avoid NSAIDS. Verbalized understanding. LAINE

## 2018-08-03 ENCOUNTER — TELEPHONE (OUTPATIENT)
Dept: INTERNAL MEDICINE | Facility: CLINIC | Age: 83
End: 2018-08-03

## 2018-08-03 NOTE — TELEPHONE ENCOUNTER
Spoke with pt's daughter, stated that she just needed clarification of pt's vitamin-D medication since there's only a quantity of 10 pills. Advised that pt will take 1 pill every 7 days, verbalized understanding. LAINE

## 2018-08-03 NOTE — TELEPHONE ENCOUNTER
----- Message from Hayder Little sent at 8/3/2018 10:27 AM CDT -----  Contact: Pt/Daughter (Clare)  Please give pt daughter a call at [phone] regarding questions about the pt medication

## 2018-09-19 ENCOUNTER — PES CALL (OUTPATIENT)
Dept: ADMINISTRATIVE | Facility: CLINIC | Age: 83
End: 2018-09-19

## 2018-10-31 DIAGNOSIS — E55.9 VITAMIN D DEFICIENCY: ICD-10-CM

## 2018-10-31 RX ORDER — ERGOCALCIFEROL 1.25 MG/1
CAPSULE ORAL
Qty: 10 CAPSULE | Refills: 0 | Status: SHIPPED | OUTPATIENT
Start: 2018-10-31 | End: 2019-09-20

## 2019-01-14 ENCOUNTER — PATIENT OUTREACH (OUTPATIENT)
Dept: ADMINISTRATIVE | Facility: HOSPITAL | Age: 84
End: 2019-01-14

## 2019-03-09 DIAGNOSIS — M81.0 OSTEOPOROSIS, UNSPECIFIED OSTEOPOROSIS TYPE, UNSPECIFIED PATHOLOGICAL FRACTURE PRESENCE: ICD-10-CM

## 2019-03-09 DIAGNOSIS — J30.1 CHRONIC SEASONAL ALLERGIC RHINITIS DUE TO POLLEN: ICD-10-CM

## 2019-03-09 DIAGNOSIS — I10 ESSENTIAL HYPERTENSION: ICD-10-CM

## 2019-03-10 RX ORDER — CETIRIZINE HYDROCHLORIDE 10 MG/1
TABLET ORAL
Qty: 90 TABLET | Refills: 0 | Status: SHIPPED | OUTPATIENT
Start: 2019-03-10 | End: 2019-09-20 | Stop reason: SDUPTHER

## 2019-03-10 RX ORDER — ALENDRONATE SODIUM 70 MG/1
TABLET ORAL
Qty: 12 TABLET | Refills: 0 | Status: SHIPPED | OUTPATIENT
Start: 2019-03-10 | End: 2019-08-30 | Stop reason: SDUPTHER

## 2019-03-10 RX ORDER — LISINOPRIL AND HYDROCHLOROTHIAZIDE 12.5; 2 MG/1; MG/1
TABLET ORAL
Qty: 90 TABLET | Refills: 0 | Status: SHIPPED | OUTPATIENT
Start: 2019-03-10 | End: 2019-06-20 | Stop reason: SDUPTHER

## 2019-06-20 DIAGNOSIS — I10 ESSENTIAL HYPERTENSION: ICD-10-CM

## 2019-06-20 DIAGNOSIS — M1A.00X0 IDIOPATHIC CHRONIC GOUT WITHOUT TOPHUS, UNSPECIFIED SITE: ICD-10-CM

## 2019-06-20 RX ORDER — LISINOPRIL AND HYDROCHLOROTHIAZIDE 12.5; 2 MG/1; MG/1
TABLET ORAL
Qty: 90 TABLET | Refills: 0 | Status: SHIPPED | OUTPATIENT
Start: 2019-06-20 | End: 2019-08-30 | Stop reason: SDUPTHER

## 2019-06-20 RX ORDER — ALLOPURINOL 100 MG/1
TABLET ORAL
Qty: 90 TABLET | Refills: 0 | Status: SHIPPED | OUTPATIENT
Start: 2019-06-20 | End: 2019-08-30 | Stop reason: SDUPTHER

## 2019-07-05 ENCOUNTER — TELEPHONE (OUTPATIENT)
Dept: INTERNAL MEDICINE | Facility: CLINIC | Age: 84
End: 2019-07-05

## 2019-07-05 NOTE — TELEPHONE ENCOUNTER
Spoke with pt's daughter. Informed pt's daughter pt has to come in for an office visit in order to get paper work filled out

## 2019-07-05 NOTE — TELEPHONE ENCOUNTER
----- Message from Pippa Jorge sent at 7/5/2019  8:40 AM CDT -----  Contact: [pt daughter - Clare ta   States she's calling to see what pt has to do to get a handicapped license and can be reached at [phone]//thanks/dbw

## 2019-08-30 DIAGNOSIS — M1A.00X0 IDIOPATHIC CHRONIC GOUT WITHOUT TOPHUS, UNSPECIFIED SITE: ICD-10-CM

## 2019-08-30 DIAGNOSIS — M81.0 OSTEOPOROSIS, UNSPECIFIED OSTEOPOROSIS TYPE, UNSPECIFIED PATHOLOGICAL FRACTURE PRESENCE: ICD-10-CM

## 2019-08-30 DIAGNOSIS — I10 ESSENTIAL HYPERTENSION: ICD-10-CM

## 2019-08-30 RX ORDER — ALENDRONATE SODIUM 70 MG/1
TABLET ORAL
Qty: 4 TABLET | Refills: 0 | Status: SHIPPED | OUTPATIENT
Start: 2019-08-30 | End: 2019-09-20 | Stop reason: SDUPTHER

## 2019-08-30 RX ORDER — ALENDRONATE SODIUM 70 MG/1
TABLET ORAL
Qty: 12 TABLET | Refills: 0 | OUTPATIENT
Start: 2019-08-30

## 2019-08-30 RX ORDER — LISINOPRIL AND HYDROCHLOROTHIAZIDE 12.5; 2 MG/1; MG/1
TABLET ORAL
Qty: 30 TABLET | Refills: 0 | Status: SHIPPED | OUTPATIENT
Start: 2019-08-30 | End: 2019-09-20 | Stop reason: SDUPTHER

## 2019-08-30 RX ORDER — ALLOPURINOL 100 MG/1
TABLET ORAL
Qty: 30 TABLET | Refills: 0 | Status: SHIPPED | OUTPATIENT
Start: 2019-08-30 | End: 2019-09-20

## 2019-09-12 ENCOUNTER — PATIENT OUTREACH (OUTPATIENT)
Dept: ADMINISTRATIVE | Facility: HOSPITAL | Age: 84
End: 2019-09-12

## 2019-09-19 DIAGNOSIS — M81.0 OSTEOPOROSIS, UNSPECIFIED OSTEOPOROSIS TYPE, UNSPECIFIED PATHOLOGICAL FRACTURE PRESENCE: ICD-10-CM

## 2019-09-19 RX ORDER — ALENDRONATE SODIUM 70 MG/1
TABLET ORAL
Qty: 4 TABLET | Refills: 0 | Status: SHIPPED | OUTPATIENT
Start: 2019-09-19 | End: 2019-12-30

## 2019-09-20 ENCOUNTER — LAB VISIT (OUTPATIENT)
Dept: LAB | Facility: HOSPITAL | Age: 84
End: 2019-09-20
Attending: FAMILY MEDICINE
Payer: MEDICARE

## 2019-09-20 ENCOUNTER — OFFICE VISIT (OUTPATIENT)
Dept: INTERNAL MEDICINE | Facility: CLINIC | Age: 84
End: 2019-09-20
Payer: MEDICARE

## 2019-09-20 VITALS
SYSTOLIC BLOOD PRESSURE: 134 MMHG | WEIGHT: 198.63 LBS | TEMPERATURE: 98 F | HEIGHT: 64 IN | OXYGEN SATURATION: 94 % | DIASTOLIC BLOOD PRESSURE: 72 MMHG | BODY MASS INDEX: 33.91 KG/M2 | HEART RATE: 101 BPM

## 2019-09-20 DIAGNOSIS — M81.0 AGE-RELATED OSTEOPOROSIS WITHOUT CURRENT PATHOLOGICAL FRACTURE: ICD-10-CM

## 2019-09-20 DIAGNOSIS — M1A.0790 IDIOPATHIC CHRONIC GOUT OF FOOT WITHOUT TOPHUS, UNSPECIFIED LATERALITY: ICD-10-CM

## 2019-09-20 DIAGNOSIS — J30.1 CHRONIC SEASONAL ALLERGIC RHINITIS DUE TO POLLEN: ICD-10-CM

## 2019-09-20 DIAGNOSIS — M1A.00X0 IDIOPATHIC CHRONIC GOUT WITHOUT TOPHUS, UNSPECIFIED SITE: ICD-10-CM

## 2019-09-20 DIAGNOSIS — M47.26 OSTEOARTHRITIS OF SPINE WITH RADICULOPATHY, LUMBAR REGION: ICD-10-CM

## 2019-09-20 DIAGNOSIS — M25.552 ACUTE HIP PAIN, LEFT: ICD-10-CM

## 2019-09-20 DIAGNOSIS — I10 ESSENTIAL HYPERTENSION: ICD-10-CM

## 2019-09-20 DIAGNOSIS — M25.552 ACUTE HIP PAIN, LEFT: Primary | ICD-10-CM

## 2019-09-20 DIAGNOSIS — N18.30 STAGE 3 CHRONIC KIDNEY DISEASE: ICD-10-CM

## 2019-09-20 DIAGNOSIS — M16.0 PRIMARY OSTEOARTHRITIS OF BOTH HIPS: ICD-10-CM

## 2019-09-20 DIAGNOSIS — M17.0 PRIMARY OSTEOARTHRITIS OF BOTH KNEES: ICD-10-CM

## 2019-09-20 DIAGNOSIS — E66.9 OBESITY (BMI 30.0-34.9): ICD-10-CM

## 2019-09-20 LAB
25(OH)D3+25(OH)D2 SERPL-MCNC: 25 NG/ML (ref 30–96)
ALBUMIN SERPL BCP-MCNC: 3.7 G/DL (ref 3.5–5.2)
ALP SERPL-CCNC: 66 U/L (ref 55–135)
ALT SERPL W/O P-5'-P-CCNC: 16 U/L (ref 10–44)
ANION GAP SERPL CALC-SCNC: 12 MMOL/L (ref 8–16)
AST SERPL-CCNC: 23 U/L (ref 10–40)
BASOPHILS # BLD AUTO: 0.02 K/UL (ref 0–0.2)
BASOPHILS NFR BLD: 0.3 % (ref 0–1.9)
BILIRUB SERPL-MCNC: 0.5 MG/DL (ref 0.1–1)
BUN SERPL-MCNC: 17 MG/DL (ref 8–23)
CALCIUM SERPL-MCNC: 9.1 MG/DL (ref 8.7–10.5)
CHLORIDE SERPL-SCNC: 108 MMOL/L (ref 95–110)
CHOLEST SERPL-MCNC: 201 MG/DL (ref 120–199)
CHOLEST/HDLC SERPL: 2.8 {RATIO} (ref 2–5)
CO2 SERPL-SCNC: 25 MMOL/L (ref 23–29)
CREAT SERPL-MCNC: 1.1 MG/DL (ref 0.5–1.4)
DIFFERENTIAL METHOD: ABNORMAL
EOSINOPHIL # BLD AUTO: 0.1 K/UL (ref 0–0.5)
EOSINOPHIL NFR BLD: 0.7 % (ref 0–8)
ERYTHROCYTE [DISTWIDTH] IN BLOOD BY AUTOMATED COUNT: 15.9 % (ref 11.5–14.5)
EST. GFR  (AFRICAN AMERICAN): 52.5 ML/MIN/1.73 M^2
EST. GFR  (NON AFRICAN AMERICAN): 45.6 ML/MIN/1.73 M^2
GLUCOSE SERPL-MCNC: 92 MG/DL (ref 70–110)
HCT VFR BLD AUTO: 40.1 % (ref 37–48.5)
HDLC SERPL-MCNC: 71 MG/DL (ref 40–75)
HDLC SERPL: 35.3 % (ref 20–50)
HGB BLD-MCNC: 12.9 G/DL (ref 12–16)
IMM GRANULOCYTES # BLD AUTO: 0.02 K/UL (ref 0–0.04)
IMM GRANULOCYTES NFR BLD AUTO: 0.3 % (ref 0–0.5)
LDLC SERPL CALC-MCNC: 108.8 MG/DL (ref 63–159)
LYMPHOCYTES # BLD AUTO: 2 K/UL (ref 1–4.8)
LYMPHOCYTES NFR BLD: 28.5 % (ref 18–48)
MCH RBC QN AUTO: 27.2 PG (ref 27–31)
MCHC RBC AUTO-ENTMCNC: 32.2 G/DL (ref 32–36)
MCV RBC AUTO: 84 FL (ref 82–98)
MONOCYTES # BLD AUTO: 0.6 K/UL (ref 0.3–1)
MONOCYTES NFR BLD: 8.7 % (ref 4–15)
NEUTROPHILS # BLD AUTO: 4.4 K/UL (ref 1.8–7.7)
NEUTROPHILS NFR BLD: 61.5 % (ref 38–73)
NONHDLC SERPL-MCNC: 130 MG/DL
NRBC BLD-RTO: 0 /100 WBC
PLATELET # BLD AUTO: 226 K/UL (ref 150–350)
PMV BLD AUTO: 11.4 FL (ref 9.2–12.9)
POTASSIUM SERPL-SCNC: 3.8 MMOL/L (ref 3.5–5.1)
PROT SERPL-MCNC: 7.3 G/DL (ref 6–8.4)
RBC # BLD AUTO: 4.75 M/UL (ref 4–5.4)
SODIUM SERPL-SCNC: 145 MMOL/L (ref 136–145)
TRIGL SERPL-MCNC: 106 MG/DL (ref 30–150)
TSH SERPL DL<=0.005 MIU/L-ACNC: 1.79 UIU/ML (ref 0.4–4)
URATE SERPL-MCNC: 7.5 MG/DL (ref 2.4–5.7)
WBC # BLD AUTO: 7.09 K/UL (ref 3.9–12.7)

## 2019-09-20 PROCEDURE — 99999 PR PBB SHADOW E&M-EST. PATIENT-LVL III: CPT | Mod: PBBFAC,,, | Performed by: FAMILY MEDICINE

## 2019-09-20 PROCEDURE — 82306 VITAMIN D 25 HYDROXY: CPT

## 2019-09-20 PROCEDURE — 99214 OFFICE O/P EST MOD 30 MIN: CPT | Mod: 25,S$PBB,, | Performed by: FAMILY MEDICINE

## 2019-09-20 PROCEDURE — 99214 PR OFFICE/OUTPT VISIT, EST, LEVL IV, 30-39 MIN: ICD-10-PCS | Mod: 25,S$PBB,, | Performed by: FAMILY MEDICINE

## 2019-09-20 PROCEDURE — 84443 ASSAY THYROID STIM HORMONE: CPT

## 2019-09-20 PROCEDURE — 99999 PR PBB SHADOW E&M-EST. PATIENT-LVL III: ICD-10-PCS | Mod: PBBFAC,,, | Performed by: FAMILY MEDICINE

## 2019-09-20 PROCEDURE — 85025 COMPLETE CBC W/AUTO DIFF WBC: CPT

## 2019-09-20 PROCEDURE — 96372 THER/PROPH/DIAG INJ SC/IM: CPT | Mod: PBBFAC

## 2019-09-20 PROCEDURE — 84550 ASSAY OF BLOOD/URIC ACID: CPT

## 2019-09-20 PROCEDURE — 99213 OFFICE O/P EST LOW 20 MIN: CPT | Mod: PBBFAC,25 | Performed by: FAMILY MEDICINE

## 2019-09-20 PROCEDURE — 80053 COMPREHEN METABOLIC PANEL: CPT

## 2019-09-20 PROCEDURE — 36415 COLL VENOUS BLD VENIPUNCTURE: CPT

## 2019-09-20 PROCEDURE — 80061 LIPID PANEL: CPT

## 2019-09-20 RX ORDER — CETIRIZINE HYDROCHLORIDE 10 MG/1
TABLET ORAL
Qty: 90 TABLET | Refills: 0 | Status: SHIPPED | OUTPATIENT
Start: 2019-09-20 | End: 2021-03-02 | Stop reason: SDUPTHER

## 2019-09-20 RX ORDER — COLCHICINE 0.6 MG/1
TABLET, FILM COATED ORAL
Qty: 30 TABLET | Refills: 2 | Status: SHIPPED | OUTPATIENT
Start: 2019-09-20 | End: 2022-12-06 | Stop reason: SDUPTHER

## 2019-09-20 RX ORDER — LISINOPRIL AND HYDROCHLOROTHIAZIDE 12.5; 2 MG/1; MG/1
1 TABLET ORAL DAILY
Qty: 90 TABLET | Refills: 3 | Status: SHIPPED | OUTPATIENT
Start: 2019-09-20 | End: 2021-03-01 | Stop reason: SDUPTHER

## 2019-09-20 RX ORDER — ALLOPURINOL 100 MG/1
100 TABLET ORAL 2 TIMES DAILY
Qty: 60 TABLET | Refills: 3 | Status: SHIPPED | OUTPATIENT
Start: 2019-09-20 | End: 2021-03-02 | Stop reason: SDUPTHER

## 2019-09-20 RX ORDER — MELOXICAM 7.5 MG/1
7.5 TABLET ORAL DAILY PRN
Qty: 90 TABLET | Refills: 2 | Status: SHIPPED | OUTPATIENT
Start: 2019-09-20 | End: 2022-12-16 | Stop reason: ALTCHOICE

## 2019-09-20 RX ORDER — TIZANIDINE 4 MG/1
4 TABLET ORAL 3 TIMES DAILY PRN
Qty: 30 TABLET | Refills: 0 | Status: SHIPPED | OUTPATIENT
Start: 2019-09-20 | End: 2019-09-30

## 2019-09-20 RX ORDER — ACETAMINOPHEN 500 MG
5000 TABLET ORAL DAILY
COMMUNITY

## 2019-09-20 RX ORDER — METHYLPREDNISOLONE ACETATE 80 MG/ML
80 INJECTION, SUSPENSION INTRA-ARTICULAR; INTRALESIONAL; INTRAMUSCULAR; SOFT TISSUE
Status: COMPLETED | OUTPATIENT
Start: 2019-09-20 | End: 2019-09-20

## 2019-09-20 RX ADMIN — METHYLPREDNISOLONE ACETATE 80 MG: 80 INJECTION, SUSPENSION INTRA-ARTICULAR; INTRALESIONAL; INTRAMUSCULAR; SOFT TISSUE at 11:09

## 2019-09-20 NOTE — PROGRESS NOTES
"Subjective:       Patient ID: Henna Jorge is a 86 y.o. female.    Chief Complaint: Hip Pain and Leg Pain    86-year-old  female patient accompanied by her daughter with Patient Active Problem List:     Hypertension     Osteoporosis     Gout, chronic     Primary osteoarthritis of both knees     History of cervical cancer     Obesity (BMI 30.0-34.9)     Stage 3 chronic kidney disease  Here with complaint of left hip pain radiating to the left thigh off and on for the past few days getting worse lately especially with changing positions from sitting to standing, reports pain up to 7 to 8/10, denies any significant pain at rest.  Patient denies any falls.   Secondary to arthritis to the lower back and bilateral hips patient has been taking meloxicam as needed but not regularly, requesting refill today  Patient denies any recent gout exacerbations but would like to have a refill on colchicine  Allergies has been stable on Zyrtec  Denies any trouble with bowel movements or urine, fever with chills    Review of Systems   Constitutional: Negative for fatigue and fever.   Eyes: Negative for visual disturbance.   Respiratory: Negative for shortness of breath.    Cardiovascular: Negative for chest pain and leg swelling.   Gastrointestinal: Negative for abdominal pain, constipation, nausea and vomiting.   Genitourinary: Negative for dysuria and flank pain.   Musculoskeletal: Positive for arthralgias, back pain, gait problem and myalgias.   Skin: Negative for rash.   Neurological: Negative for weakness, light-headedness, numbness and headaches.   Psychiatric/Behavioral: Negative for sleep disturbance.         /72 (BP Location: Left arm, Patient Position: Sitting, BP Method: Large (Manual))   Pulse 101   Temp 98.4 °F (36.9 °C) (Tympanic)   Ht 5' 4" (1.626 m)   Wt 90.1 kg (198 lb 10.2 oz)   SpO2 (!) 94%   BMI 34.10 kg/m²   Objective:      Physical Exam   Constitutional: She is oriented to person, " place, and time. She appears well-developed and well-nourished.   HENT:   Head: Normocephalic and atraumatic.   Mouth/Throat: Oropharynx is clear and moist.   Cardiovascular: Normal rate, regular rhythm and normal heart sounds.   No murmur heard.  Pulmonary/Chest: Effort normal and breath sounds normal. She has no wheezes.   Abdominal: Soft. Bowel sounds are normal. There is no tenderness.   Musculoskeletal: She exhibits tenderness. She exhibits no edema.   Positive for tenderness to the left hip on palpation.   No restricted range of motion noted on exam today  Straight leg raise test positive bilaterally  Paraspinal lumbar muscle tenderness noted to the left side with tightness to the muscles   Neurological: She is alert and oriented to person, place, and time.   Skin: Skin is warm and dry. No rash noted. No erythema.   Psychiatric: She has a normal mood and affect.         Assessment/Plan:   1. Acute hip pain, left  - meloxicam (MOBIC) 7.5 MG tablet; Take 1 tablet (7.5 mg total) by mouth daily as needed.  Dispense: 90 tablet; Refill: 2  - tiZANidine (ZANAFLEX) 4 MG tablet; Take 1 tablet (4 mg total) by mouth 3 (three) times daily as needed.  Dispense: 30 tablet; Refill: 0  - CBC auto differential; Future  - methylPREDNISolone acetate injection 80 mg  2. Primary osteoarthritis of both hips  - methylPREDNISolone acetate injection 80 mg  3. Osteoarthritis of spine with radiculopathy, lumbar region  - methylPREDNISolone acetate injection 80 mg    Reviewed previous x-rays showing arthritis changes to the lumbar spine and bilateral hips  Depo-Medrol 80 mg IM x1 given today secondary to acute hip pain  Refill given on meloxicam and Zanaflex prescribed today for symptomatic relief  Warm compresses recommended  Graded exercise regimen recommended    4. Chronic seasonal allergic rhinitis due to pollen  - cetirizine (ZYRTEC) 10 MG tablet; TAKE 1 TABLET(10 MG) BY MOUTH EVERY DAY  Dispense: 90 tablet; Refill: 0  the  Stable    5. Primary osteoarthritis of both knees  Graded exercise regimen recommended    6. Stage 3 chronic kidney disease  - Comprehensive metabolic panel; Future  Encouraged to drink adequate fluids and avoid over-the-counter NSAIDs    7. Essential hypertension  - Comprehensive metabolic panel; Future  - Lipid panel; Future  - TSH; Future  - Urinalysis; Future  - lisinopril-hydrochlorothiazide (PRINZIDE,ZESTORETIC) 20-12.5 mg per tablet; Take 1 tablet by mouth once daily.  Dispense: 90 tablet; Refill: 3  Blood pressure is stable today currently on lisinopril hydrochlorothiazide 20/12.5 mg daily  Restrict salt intake    8. Idiopathic chronic gout of foot without tophus, unspecified laterality  - Uric acid; Future  Stable on allopurinol 100 mg daily    9. Age-related osteoporosis without current pathological fracture  - Vitamin D; Future  Stable on Fosamax weekly and vitamin-D supplements over-the-counter    10. Obesity (BMI 30.0-34.9)     Refuses flu shot today

## 2019-11-04 ENCOUNTER — OFFICE VISIT (OUTPATIENT)
Dept: INTERNAL MEDICINE | Facility: CLINIC | Age: 84
End: 2019-11-04
Payer: MEDICARE

## 2019-11-04 VITALS
DIASTOLIC BLOOD PRESSURE: 70 MMHG | SYSTOLIC BLOOD PRESSURE: 148 MMHG | WEIGHT: 199.31 LBS | HEIGHT: 64 IN | BODY MASS INDEX: 34.03 KG/M2 | TEMPERATURE: 98 F

## 2019-11-04 DIAGNOSIS — M17.0 PRIMARY OSTEOARTHRITIS OF BOTH KNEES: ICD-10-CM

## 2019-11-04 DIAGNOSIS — I10 ESSENTIAL HYPERTENSION: ICD-10-CM

## 2019-11-04 DIAGNOSIS — H40.1121 PRIMARY OPEN-ANGLE GLAUCOMA, LEFT EYE, MILD STAGE: ICD-10-CM

## 2019-11-04 DIAGNOSIS — M1A.0790 IDIOPATHIC CHRONIC GOUT OF FOOT WITHOUT TOPHUS, UNSPECIFIED LATERALITY: ICD-10-CM

## 2019-11-04 DIAGNOSIS — Z85.41 HISTORY OF CERVICAL CANCER: ICD-10-CM

## 2019-11-04 DIAGNOSIS — Z00.00 ENCOUNTER FOR PREVENTIVE HEALTH EXAMINATION: Primary | ICD-10-CM

## 2019-11-04 DIAGNOSIS — M16.0 PRIMARY OSTEOARTHRITIS OF BOTH HIPS: ICD-10-CM

## 2019-11-04 DIAGNOSIS — Z91.89 POTENTIAL FOR COGNITIVE IMPAIRMENT: ICD-10-CM

## 2019-11-04 DIAGNOSIS — J30.1 CHRONIC SEASONAL ALLERGIC RHINITIS DUE TO POLLEN: ICD-10-CM

## 2019-11-04 DIAGNOSIS — R94.120 ABNORMAL HEARING SCREEN: ICD-10-CM

## 2019-11-04 DIAGNOSIS — M81.0 AGE-RELATED OSTEOPOROSIS WITHOUT CURRENT PATHOLOGICAL FRACTURE: ICD-10-CM

## 2019-11-04 DIAGNOSIS — M47.26 OSTEOARTHRITIS OF SPINE WITH RADICULOPATHY, LUMBAR REGION: ICD-10-CM

## 2019-11-04 DIAGNOSIS — H40.1113 PRIMARY OPEN-ANGLE GLAUCOMA, RIGHT EYE, SEVERE STAGE: ICD-10-CM

## 2019-11-04 DIAGNOSIS — N18.30 STAGE 3 CHRONIC KIDNEY DISEASE: ICD-10-CM

## 2019-11-04 PROCEDURE — 99213 OFFICE O/P EST LOW 20 MIN: CPT | Mod: PBBFAC | Performed by: PHYSICIAN ASSISTANT

## 2019-11-04 PROCEDURE — 99999 PR PBB SHADOW E&M-EST. PATIENT-LVL III: ICD-10-PCS | Mod: PBBFAC,,, | Performed by: PHYSICIAN ASSISTANT

## 2019-11-04 PROCEDURE — 99999 PR PBB SHADOW E&M-EST. PATIENT-LVL III: CPT | Mod: PBBFAC,,, | Performed by: PHYSICIAN ASSISTANT

## 2019-11-04 PROCEDURE — G0439 PR MEDICARE ANNUAL WELLNESS SUBSEQUENT VISIT: ICD-10-PCS | Mod: S$GLB,,, | Performed by: PHYSICIAN ASSISTANT

## 2019-11-04 PROCEDURE — G0439 PPPS, SUBSEQ VISIT: HCPCS | Mod: S$GLB,,, | Performed by: PHYSICIAN ASSISTANT

## 2019-11-04 NOTE — PATIENT INSTRUCTIONS
Counseling and Referral of Other Preventative  (Italic type indicates deductible and co-insurance are waived)    Patient Name: Henna Jorge  Today's Date: 11/4/2019    Health Maintenance       Date Due Completion Date    Pneumococcal Vaccine (65+ High/Highest Risk) (1 of 2 - PCV13) 03/20/1998 ---    Shingles Vaccine (2 of 3) 08/20/2015 6/25/2015    Influenza Vaccine (1) 09/01/2019 ---    Lipid Panel 09/20/2020 9/20/2019    TETANUS VACCINE 02/16/2027 2/16/2017 (Declined)    Override on 2/16/2017: Declined        No orders of the defined types were placed in this encounter.    The following information is provided to all patients.  This information is to help you find resources for any of the problems found today that may be affecting your health:                Living healthy guide: www.[facility].[place].gov      Understanding Diabetes: www.diabetes.org      Eating healthy: www.cdc.gov/healthyweight      [facility] home safety checklist: www.cdc.gov/steadi/patient.html      Agency on Aging: www.goea.[place].[facility]      Alcoholics anonymous (AA): www.aa.org      Physical Activity: www.ashihs.nih.gov/jv0cggy      Tobacco use: www.quitwithusla.org

## 2019-11-04 NOTE — PROGRESS NOTES
"Henna Jorge presented for a  Medicare AWV and comprehensive Health Risk Assessment today. The following components were reviewed and updated:    · Medical history  · Family History  · Social history  · Allergies and Current Medications  · Health Risk Assessment  · Health Maintenance  · Care Team     ** See Completed Assessments for Annual Wellness Visit within the encounter summary.**       The following assessments were completed:  · Living Situation  · CAGE  · Depression Screening  · Timed Get Up and Go  · Whisper Test  · Cognitive Function Screening  · Nutrition Screening  · ADL Screening  · PAQ Screening    Patient Care Team:  Mary Corbett MD as PCP - General (Family Medicine)  Mary Corbett MD as PCP - MSSP Attributed  Deepali Murguia LPN as Care Coordinator (Internal Medicine)  Mateo Field MD (Ophthalmology)    Vitals:    11/04/19 1221   BP: (!) 148/70   BP Location: Right arm   Temp: 97.9 °F (36.6 °C)   TempSrc: Tympanic   Weight: 90.4 kg (199 lb 4.7 oz)   Height: 5' 4" (1.626 m)     Body mass index is 34.21 kg/m².     Physical Exam   Constitutional: She is oriented to person, place, and time. She appears well-developed and well-nourished. No distress.   HENT:   Head: Normocephalic and atraumatic.   Eyes: EOM are normal. No scleral icterus.   Neck: Neck supple.   Cardiovascular: Normal rate and regular rhythm.   Pulmonary/Chest: Effort normal and breath sounds normal. No respiratory distress. She has no decreased breath sounds. She has no wheezes. She has no rhonchi. She has no rales.   Musculoskeletal: Normal range of motion.   Neurological: She is alert and oriented to person, place, and time.   Skin: Skin is warm and dry.   Psychiatric: She has a normal mood and affect. Her speech is normal and behavior is normal. Thought content normal.         Diagnoses and health risks identified today and associated recommendations/orders:    1. Encounter for preventive health examination  Pt to " consider influenza, Shingrix, and pneumococcal vaccines at pharmacy as discussed.    2. Stage 3 chronic kidney disease  Stable. Continue current treatment plan as prescribed by your PCP and f/u with your PCP for further management.  Component      Latest Ref Rng & Units 9/20/2019 2/20/2018   BUN, Bld      8 - 23 mg/dL 17 26 (H)   Creatinine      0.5 - 1.4 mg/dL 1.1 1.1     Component      Latest Ref Rng & Units 9/20/2019 2/20/2018   eGFR if African American      >60 mL/min/1.73 m:2 52.5 (A) 53.3 (A)     3. Essential hypertension  BP elevated today. Pt taking lisinopril-HCT. F/u with your PCP for further management.    4. Idiopathic chronic gout of foot without tophus, unspecified laterality  Stable. Pt taking allopurinol and colchicine. Continue current treatment plan as prescribed by your PCP and f/u with your PCP for further management.    5. Age-related osteoporosis without current pathological fracture  Stable. DEXA 7/13/15. Pt taking Fosamax and vit D suppl. Continue current treatment plan as prescribed by your PCP and f/u with your PCP for further management.    6. Primary osteoarthritis of both hips  Stable. Hip xrays 7/26/18. Pt taking Mobic. Continue current treatment plan as prescribed by your PCP and f/u with your PCP for further management.    7. Primary osteoarthritis of both knees  Stable. Knee xrays 6/9/16. Pt taking Mobic. Continue current treatment plan as prescribed by your PCP and f/u with your PCP for further management.    8. Osteoarthritis of spine with radiculopathy, lumbar region  Stable. Hip xrays 7/26/18. Pt taking Mobic. Pt reports pain is recurrent and family member would like to know if CBD oil would be an option for pt. Will forward to PCP. Continue current treatment plan as prescribed by your PCP and f/u with your PCP for further management.    9. Chronic seasonal allergic rhinitis due to pollen  Stable. Pt taking Zyrtec. Continue current treatment plan as prescribed by your PCP and f/u  with your PCP for further management.    10. History of cervical cancer  Stable. Pt s/p radiation and hysterectomy. Continue current treatment plan as prescribed by your PCP and f/u with your PCP for further management.    11. Primary open-angle glaucoma, right eye, severe stage; 12. Primary open-angle glaucoma, left eye, mild stage  Stable. Pt using Cosopt and Travatan eyedrops. Continue current treatment plan as prescribed by your PCP and ophthalmologist and f/u with them for further management.    13. Abnormal hearing screen  This a new problem identified during clinic visit today. Pt declines ENT / audiology eval at this time. Follow-up with PCP for evaluation and treatment plan.    14. Potential for cognitive impairment  This a new problem identified during clinic visit today. Pt with abnormal score of 3 on Cognitive Function Screen today. Pt denies getting lost or leaving stove on. Follow-up with PCP for evaluation and treatment plan.    Please obtain any medical records from past / present outside medical providers and give to PCP for review and further medical recommendations.    Provided Henna with a 5-10 year written screening schedule and personal prevention plan. Recommendations were developed using the USPSTF age appropriate recommendations. Education, counseling, and referrals were provided as needed. After Visit Summary printed and given to patient which includes a list of additional screenings\tests needed.    Follow up in about 1 year (around 11/4/2020) for HRA. F/u with PCP Dr. Corbett as scheduled 3/24/20 and specialists as recommended for health management.    JOSHUA Javier     I offered to discuss end of life issues, including information on how to make advance directives that the patient could use to name someone who would make medical decisions on their behalf if they became too ill to make themselves.  ___Patient declined  _X_Patient is interested, I provided paper work and offered to  discuss.

## 2019-12-30 DIAGNOSIS — M81.0 OSTEOPOROSIS, UNSPECIFIED OSTEOPOROSIS TYPE, UNSPECIFIED PATHOLOGICAL FRACTURE PRESENCE: ICD-10-CM

## 2019-12-30 RX ORDER — ALENDRONATE SODIUM 70 MG/1
70 TABLET ORAL
Qty: 4 TABLET | Refills: 1 | Status: SHIPPED | OUTPATIENT
Start: 2019-12-30 | End: 2020-04-09

## 2020-04-09 DIAGNOSIS — M81.0 OSTEOPOROSIS, UNSPECIFIED OSTEOPOROSIS TYPE, UNSPECIFIED PATHOLOGICAL FRACTURE PRESENCE: ICD-10-CM

## 2020-04-09 RX ORDER — ALENDRONATE SODIUM 70 MG/1
TABLET ORAL
Qty: 12 TABLET | Refills: 0 | Status: SHIPPED | OUTPATIENT
Start: 2020-04-09 | End: 2021-02-03

## 2020-04-09 RX ORDER — ALENDRONATE SODIUM 70 MG/1
TABLET ORAL
Qty: 4 TABLET | Refills: 1 | Status: SHIPPED | OUTPATIENT
Start: 2020-04-09 | End: 2020-04-09

## 2021-03-01 DIAGNOSIS — I10 ESSENTIAL HYPERTENSION: ICD-10-CM

## 2021-03-01 RX ORDER — LISINOPRIL AND HYDROCHLOROTHIAZIDE 12.5; 2 MG/1; MG/1
1 TABLET ORAL DAILY
Qty: 30 TABLET | Refills: 0 | Status: SHIPPED | OUTPATIENT
Start: 2021-03-01 | End: 2021-04-20 | Stop reason: SDUPTHER

## 2021-03-02 DIAGNOSIS — J30.1 CHRONIC SEASONAL ALLERGIC RHINITIS DUE TO POLLEN: ICD-10-CM

## 2021-03-02 DIAGNOSIS — M1A.00X0 IDIOPATHIC CHRONIC GOUT WITHOUT TOPHUS, UNSPECIFIED SITE: ICD-10-CM

## 2021-03-02 RX ORDER — CETIRIZINE HYDROCHLORIDE 10 MG/1
TABLET ORAL
Qty: 30 TABLET | Refills: 0 | Status: SHIPPED | OUTPATIENT
Start: 2021-03-02 | End: 2022-12-16 | Stop reason: ALTCHOICE

## 2021-03-02 RX ORDER — ALLOPURINOL 100 MG/1
100 TABLET ORAL 2 TIMES DAILY
Qty: 60 TABLET | Refills: 0 | Status: SHIPPED | OUTPATIENT
Start: 2021-03-02 | End: 2021-04-20

## 2021-04-20 ENCOUNTER — HOSPITAL ENCOUNTER (OUTPATIENT)
Dept: RADIOLOGY | Facility: HOSPITAL | Age: 86
Discharge: HOME | End: 2021-04-20
Attending: FAMILY MEDICINE
Payer: MEDICARE

## 2021-04-20 ENCOUNTER — OFFICE VISIT (OUTPATIENT)
Dept: INTERNAL MEDICINE | Facility: CLINIC | Age: 86
End: 2021-04-20
Payer: MEDICARE

## 2021-04-20 VITALS
WEIGHT: 202.38 LBS | TEMPERATURE: 98 F | OXYGEN SATURATION: 98 % | DIASTOLIC BLOOD PRESSURE: 86 MMHG | BODY MASS INDEX: 34.55 KG/M2 | HEIGHT: 64 IN | SYSTOLIC BLOOD PRESSURE: 148 MMHG | RESPIRATION RATE: 16 BRPM | HEART RATE: 77 BPM

## 2021-04-20 DIAGNOSIS — M79.89 SWELLING OF LEFT FOOT: ICD-10-CM

## 2021-04-20 DIAGNOSIS — I10 ESSENTIAL HYPERTENSION: Primary | ICD-10-CM

## 2021-04-20 DIAGNOSIS — N18.31 STAGE 3A CHRONIC KIDNEY DISEASE: ICD-10-CM

## 2021-04-20 DIAGNOSIS — M17.0 PRIMARY OSTEOARTHRITIS OF BOTH KNEES: ICD-10-CM

## 2021-04-20 DIAGNOSIS — M81.0 AGE-RELATED OSTEOPOROSIS WITHOUT CURRENT PATHOLOGICAL FRACTURE: ICD-10-CM

## 2021-04-20 DIAGNOSIS — E66.9 OBESITY (BMI 30.0-34.9): ICD-10-CM

## 2021-04-20 DIAGNOSIS — Z85.41 HISTORY OF CERVICAL CANCER: ICD-10-CM

## 2021-04-20 DIAGNOSIS — M47.26 OSTEOARTHRITIS OF SPINE WITH RADICULOPATHY, LUMBAR REGION: ICD-10-CM

## 2021-04-20 DIAGNOSIS — J30.1 CHRONIC SEASONAL ALLERGIC RHINITIS DUE TO POLLEN: ICD-10-CM

## 2021-04-20 DIAGNOSIS — E55.9 VITAMIN D DEFICIENCY: ICD-10-CM

## 2021-04-20 DIAGNOSIS — M1A.0790 IDIOPATHIC CHRONIC GOUT OF FOOT WITHOUT TOPHUS, UNSPECIFIED LATERALITY: ICD-10-CM

## 2021-04-20 DIAGNOSIS — M16.0 PRIMARY OSTEOARTHRITIS OF BOTH HIPS: ICD-10-CM

## 2021-04-20 PROCEDURE — 73630 X-RAY EXAM OF FOOT: CPT | Mod: 26,LT,, | Performed by: RADIOLOGY

## 2021-04-20 PROCEDURE — 99214 OFFICE O/P EST MOD 30 MIN: CPT | Mod: S$PBB,,, | Performed by: FAMILY MEDICINE

## 2021-04-20 PROCEDURE — 99999 PR PBB SHADOW E&M-EST. PATIENT-LVL IV: CPT | Mod: PBBFAC,,, | Performed by: FAMILY MEDICINE

## 2021-04-20 PROCEDURE — 99214 PR OFFICE/OUTPT VISIT, EST, LEVL IV, 30-39 MIN: ICD-10-PCS | Mod: S$PBB,,, | Performed by: FAMILY MEDICINE

## 2021-04-20 PROCEDURE — 73630 X-RAY EXAM OF FOOT: CPT | Mod: TC,LT

## 2021-04-20 PROCEDURE — 99214 OFFICE O/P EST MOD 30 MIN: CPT | Mod: PBBFAC,25 | Performed by: FAMILY MEDICINE

## 2021-04-20 PROCEDURE — 99999 PR PBB SHADOW E&M-EST. PATIENT-LVL IV: ICD-10-PCS | Mod: PBBFAC,,, | Performed by: FAMILY MEDICINE

## 2021-04-20 PROCEDURE — 73630 XR FOOT COMPLETE 3 VIEW LEFT: ICD-10-PCS | Mod: 26,LT,, | Performed by: RADIOLOGY

## 2021-04-20 RX ORDER — LISINOPRIL AND HYDROCHLOROTHIAZIDE 12.5; 2 MG/1; MG/1
1 TABLET ORAL DAILY
Qty: 90 TABLET | Refills: 1 | Status: SHIPPED | OUTPATIENT
Start: 2021-04-20 | End: 2021-10-27

## 2021-04-20 RX ORDER — ALENDRONATE SODIUM 70 MG/1
70 TABLET ORAL
Qty: 4 TABLET | Refills: 4 | Status: SHIPPED | OUTPATIENT
Start: 2021-04-20 | End: 2022-01-11

## 2021-04-20 RX ORDER — ALLOPURINOL 100 MG/1
200 TABLET ORAL DAILY
Qty: 60 TABLET | Refills: 3 | Status: SHIPPED | OUTPATIENT
Start: 2021-04-20 | End: 2022-05-18

## 2021-06-22 ENCOUNTER — IMMUNIZATION (OUTPATIENT)
Dept: INTERNAL MEDICINE | Facility: CLINIC | Age: 86
End: 2021-06-22
Payer: MEDICARE

## 2021-06-22 DIAGNOSIS — Z23 NEED FOR VACCINATION: Primary | ICD-10-CM

## 2021-06-22 PROCEDURE — 91300 COVID-19, MRNA, LNP-S, PF, 30 MCG/0.3 ML DOSE VACCINE: CPT | Mod: PBBFAC

## 2021-07-13 ENCOUNTER — IMMUNIZATION (OUTPATIENT)
Dept: INTERNAL MEDICINE | Facility: CLINIC | Age: 86
End: 2021-07-13
Payer: MEDICARE

## 2021-07-13 ENCOUNTER — TELEPHONE (OUTPATIENT)
Dept: INTERNAL MEDICINE | Facility: CLINIC | Age: 86
End: 2021-07-13

## 2021-07-13 DIAGNOSIS — Z23 NEED FOR VACCINATION: Primary | ICD-10-CM

## 2021-07-13 PROCEDURE — 91300 COVID-19, MRNA, LNP-S, PF, 30 MCG/0.3 ML DOSE VACCINE: CPT | Mod: PBBFAC

## 2021-07-13 PROCEDURE — 0002A COVID-19, MRNA, LNP-S, PF, 30 MCG/0.3 ML DOSE VACCINE: CPT | Mod: PBBFAC

## 2022-01-10 DIAGNOSIS — M81.0 AGE-RELATED OSTEOPOROSIS WITHOUT CURRENT PATHOLOGICAL FRACTURE: ICD-10-CM

## 2022-01-11 DIAGNOSIS — M81.0 AGE-RELATED OSTEOPOROSIS WITHOUT CURRENT PATHOLOGICAL FRACTURE: ICD-10-CM

## 2022-01-11 RX ORDER — ALENDRONATE SODIUM 70 MG/1
TABLET ORAL
Qty: 12 TABLET | Refills: 0 | Status: SHIPPED | OUTPATIENT
Start: 2022-01-11 | End: 2022-05-18

## 2022-01-11 RX ORDER — ALENDRONATE SODIUM 70 MG/1
TABLET ORAL
Qty: 4 TABLET | Refills: 1 | Status: SHIPPED | OUTPATIENT
Start: 2022-01-11 | End: 2022-01-11

## 2022-02-28 ENCOUNTER — PES CALL (OUTPATIENT)
Dept: ADMINISTRATIVE | Facility: CLINIC | Age: 87
End: 2022-02-28
Payer: MEDICARE

## 2022-04-05 ENCOUNTER — IMMUNIZATION (OUTPATIENT)
Dept: PRIMARY CARE CLINIC | Facility: CLINIC | Age: 87
End: 2022-04-05
Payer: MEDICARE

## 2022-04-05 DIAGNOSIS — Z23 NEED FOR VACCINATION: Primary | ICD-10-CM

## 2022-04-05 PROCEDURE — 91305 COVID-19, MRNA, LNP-S, PF, 30 MCG/0.3 ML DOSE VACCINE (PFIZER): CPT | Mod: PBBFAC | Performed by: FAMILY MEDICINE

## 2022-05-05 ENCOUNTER — TELEPHONE (OUTPATIENT)
Dept: INTERNAL MEDICINE | Facility: CLINIC | Age: 87
End: 2022-05-05
Payer: MEDICARE

## 2022-05-09 DIAGNOSIS — I10 ESSENTIAL HYPERTENSION: ICD-10-CM

## 2022-05-09 RX ORDER — LISINOPRIL AND HYDROCHLOROTHIAZIDE 12.5; 2 MG/1; MG/1
TABLET ORAL
Qty: 30 TABLET | Refills: 0 | Status: SHIPPED | OUTPATIENT
Start: 2022-05-09 | End: 2022-05-26 | Stop reason: SINTOL

## 2022-05-09 NOTE — TELEPHONE ENCOUNTER
Care Due:                  Date            Visit Type   Department     Provider  --------------------------------------------------------------------------------                                EP -                              PRIMARY      [facility] INTER[facility]  Mary Mainampati  Last Visit: 04-      CARE ([facility])   MEDICINE       Corbett                              EP -                              PRIMARY      [facility]  Mary Mainampati  Next Visit: 05-      CARE ([facility])   MEDICINE       Corbett                                                            Last  Test          Frequency    Reason                     Performed    Due Date  --------------------------------------------------------------------------------    CBC.........  12 months..  allopurinoL..............  04- 04-    CMP.........  12 months..  allopurinoL,               04- 04-                             lisinopriL-hydrochlorothi                             azide....................    Uric Acid...  12 months..  allopurinoL..............  04- 04-    Health [facility] Embedded Care Gaps. Reference number: [id number]. 5/09/2022   6:18:22 AM CDT

## 2022-05-09 NOTE — TELEPHONE ENCOUNTER
Refill Routing Note   Medication(s) are not appropriate for processing by [facility] for the following reason(s):      - Required laboratory values are outdated  - Required vitals are outdated    [facility] action(s):  Defer          Medication reconciliation completed: No     Appointments  past 12m or future 3m with PCP    Date Provider   Last Visit   4/20/2021 Mary Corbett MD   Next Visit   5/26/2022 Mary Corbett MD   ED visits in past 90 days: 0        Note composed:5:57 PM 05/09/2022

## 2022-05-25 DIAGNOSIS — M1A.0790 IDIOPATHIC CHRONIC GOUT OF FOOT WITHOUT TOPHUS, UNSPECIFIED LATERALITY: ICD-10-CM

## 2022-05-25 RX ORDER — ALLOPURINOL 100 MG/1
TABLET ORAL
Qty: 60 TABLET | Refills: 0 | Status: SHIPPED | OUTPATIENT
Start: 2022-05-25 | End: 2022-05-27 | Stop reason: DRUGHIGH

## 2022-05-25 NOTE — TELEPHONE ENCOUNTER
No new care gaps identified.  [facility] Embedded Care Gaps. Reference number: [id number]. 5/25/2022   4:04:51 PM CDT

## 2022-05-25 NOTE — TELEPHONE ENCOUNTER
Refill Routing Note   Medication(s) are not appropriate for processing by [facility] for the following reason(s):      - Required laboratory values are outdated    [facility] action(s):  Defer          Medication reconciliation completed: No     Appointments  past 12m or future 3m with PCP    Date Provider   Last Visit   4/20/2021 Mary Corbett MD   Next Visit   5/26/2022 Mary Corbett MD   ED visits in past 90 days: 0        Note composed:4:04 PM 05/25/2022

## 2022-05-26 ENCOUNTER — LAB VISIT (OUTPATIENT)
Dept: LAB | Facility: HOSPITAL | Age: 87
End: 2022-05-26
Attending: FAMILY MEDICINE
Payer: MEDICARE

## 2022-05-26 ENCOUNTER — OFFICE VISIT (OUTPATIENT)
Dept: INTERNAL MEDICINE | Facility: CLINIC | Age: 87
End: 2022-05-26
Payer: MEDICARE

## 2022-05-26 ENCOUNTER — OFFICE VISIT (OUTPATIENT)
Dept: PODIATRY | Facility: CLINIC | Age: 87
End: 2022-05-26
Payer: MEDICARE

## 2022-05-26 VITALS
RESPIRATION RATE: 16 BRPM | HEART RATE: 69 BPM | DIASTOLIC BLOOD PRESSURE: 70 MMHG | BODY MASS INDEX: 32.97 KG/M2 | OXYGEN SATURATION: 97 % | WEIGHT: 193.13 LBS | SYSTOLIC BLOOD PRESSURE: 122 MMHG | HEIGHT: 64 IN

## 2022-05-26 VITALS — WEIGHT: 193 LBS | HEIGHT: 64 IN | BODY MASS INDEX: 32.95 KG/M2

## 2022-05-26 DIAGNOSIS — M1A.0790 IDIOPATHIC CHRONIC GOUT OF FOOT WITHOUT TOPHUS, UNSPECIFIED LATERALITY: ICD-10-CM

## 2022-05-26 DIAGNOSIS — N18.31 STAGE 3A CHRONIC KIDNEY DISEASE: ICD-10-CM

## 2022-05-26 DIAGNOSIS — M16.0 PRIMARY OSTEOARTHRITIS OF BOTH HIPS: ICD-10-CM

## 2022-05-26 DIAGNOSIS — B35.1 PAIN DUE TO ONYCHOMYCOSIS OF TOENAILS OF BOTH FEET: Primary | ICD-10-CM

## 2022-05-26 DIAGNOSIS — M47.26 OSTEOARTHRITIS OF SPINE WITH RADICULOPATHY, LUMBAR REGION: ICD-10-CM

## 2022-05-26 DIAGNOSIS — I10 ESSENTIAL HYPERTENSION: Primary | ICD-10-CM

## 2022-05-26 DIAGNOSIS — J30.1 CHRONIC SEASONAL ALLERGIC RHINITIS DUE TO POLLEN: ICD-10-CM

## 2022-05-26 DIAGNOSIS — M81.0 AGE-RELATED OSTEOPOROSIS WITHOUT CURRENT PATHOLOGICAL FRACTURE: ICD-10-CM

## 2022-05-26 DIAGNOSIS — I10 ESSENTIAL HYPERTENSION: ICD-10-CM

## 2022-05-26 DIAGNOSIS — M79.674 PAIN DUE TO ONYCHOMYCOSIS OF TOENAILS OF BOTH FEET: Primary | ICD-10-CM

## 2022-05-26 DIAGNOSIS — M17.0 PRIMARY OSTEOARTHRITIS OF BOTH KNEES: ICD-10-CM

## 2022-05-26 DIAGNOSIS — Z85.41 HISTORY OF CERVICAL CANCER: ICD-10-CM

## 2022-05-26 DIAGNOSIS — E66.9 OBESITY (BMI 30.0-34.9): ICD-10-CM

## 2022-05-26 DIAGNOSIS — L60.2 NAIL DISORDER (ONYCHOGRYPHOSIS): ICD-10-CM

## 2022-05-26 DIAGNOSIS — M79.675 PAIN DUE TO ONYCHOMYCOSIS OF TOENAILS OF BOTH FEET: Primary | ICD-10-CM

## 2022-05-26 LAB
ALBUMIN SERPL BCP-MCNC: 3.5 G/DL (ref 3.5–5.2)
ALP SERPL-CCNC: 71 U/L (ref 55–135)
ALT SERPL W/O P-5'-P-CCNC: 13 U/L (ref 10–44)
ANION GAP SERPL CALC-SCNC: 9 MMOL/L (ref 8–16)
AST SERPL-CCNC: 16 U/L (ref 10–40)
BASOPHILS # BLD AUTO: 0.02 K/UL (ref 0–0.2)
BASOPHILS NFR BLD: 0.2 % (ref 0–1.9)
BILIRUB SERPL-MCNC: 0.2 MG/DL (ref 0.1–1)
BUN SERPL-MCNC: 22 MG/DL (ref 8–23)
CALCIUM SERPL-MCNC: 8.8 MG/DL (ref 8.7–10.5)
CHLORIDE SERPL-SCNC: 110 MMOL/L (ref 95–110)
CO2 SERPL-SCNC: 25 MMOL/L (ref 23–29)
CREAT SERPL-MCNC: 1 MG/DL (ref 0.5–1.4)
DIFFERENTIAL METHOD: ABNORMAL
EOSINOPHIL # BLD AUTO: 0 K/UL (ref 0–0.5)
EOSINOPHIL NFR BLD: 0.5 % (ref 0–8)
ERYTHROCYTE [DISTWIDTH] IN BLOOD BY AUTOMATED COUNT: 15.7 % (ref 11.5–14.5)
EST. GFR  (AFRICAN AMERICAN): 57.7 ML/MIN/1.73 M^2
EST. GFR  (NON AFRICAN AMERICAN): 50.1 ML/MIN/1.73 M^2
GLUCOSE SERPL-MCNC: 94 MG/DL (ref 70–110)
HCT VFR BLD AUTO: 38.5 % (ref 37–48.5)
HGB BLD-MCNC: 12.6 G/DL (ref 12–16)
IMM GRANULOCYTES # BLD AUTO: 0.02 K/UL (ref 0–0.04)
IMM GRANULOCYTES NFR BLD AUTO: 0.2 % (ref 0–0.5)
LYMPHOCYTES # BLD AUTO: 2.5 K/UL (ref 1–4.8)
LYMPHOCYTES NFR BLD: 29.5 % (ref 18–48)
MCH RBC QN AUTO: 27.3 PG (ref 27–31)
MCHC RBC AUTO-ENTMCNC: 32.7 G/DL (ref 32–36)
MCV RBC AUTO: 83 FL (ref 82–98)
MONOCYTES # BLD AUTO: 0.7 K/UL (ref 0.3–1)
MONOCYTES NFR BLD: 8.2 % (ref 4–15)
NEUTROPHILS # BLD AUTO: 5.1 K/UL (ref 1.8–7.7)
NEUTROPHILS NFR BLD: 61.4 % (ref 38–73)
NRBC BLD-RTO: 0 /100 WBC
PLATELET # BLD AUTO: 262 K/UL (ref 150–450)
PMV BLD AUTO: 11.2 FL (ref 9.2–12.9)
POTASSIUM SERPL-SCNC: 3.8 MMOL/L (ref 3.5–5.1)
PROT SERPL-MCNC: 6.5 G/DL (ref 6–8.4)
RBC # BLD AUTO: 4.62 M/UL (ref 4–5.4)
SODIUM SERPL-SCNC: 144 MMOL/L (ref 136–145)
TSH SERPL DL<=0.005 MIU/L-ACNC: 2.15 UIU/ML (ref 0.4–4)
URATE SERPL-MCNC: 7.7 MG/DL (ref 2.4–5.7)
WBC # BLD AUTO: 8.31 K/UL (ref 3.9–12.7)

## 2022-05-26 PROCEDURE — 99999 PR PBB SHADOW E&M-EST. PATIENT-LVL III: CPT | Mod: PBBFAC,,, | Performed by: PODIATRIST

## 2022-05-26 PROCEDURE — 99999 PR PBB SHADOW E&M-EST. PATIENT-LVL IV: CPT | Mod: PBBFAC,,, | Performed by: FAMILY MEDICINE

## 2022-05-26 PROCEDURE — 99203 PR OFFICE/OUTPT VISIT, NEW, LEVL III, 30-44 MIN: ICD-10-PCS | Mod: 25,S$PBB,, | Performed by: PODIATRIST

## 2022-05-26 PROCEDURE — 80053 COMPREHEN METABOLIC PANEL: CPT | Performed by: FAMILY MEDICINE

## 2022-05-26 PROCEDURE — 11721 DEBRIDE NAIL 6 OR MORE: CPT | Mod: S$PBB,,, | Performed by: PODIATRIST

## 2022-05-26 PROCEDURE — 85025 COMPLETE CBC W/AUTO DIFF WBC: CPT | Performed by: FAMILY MEDICINE

## 2022-05-26 PROCEDURE — 99214 PR OFFICE/OUTPT VISIT, EST, LEVL IV, 30-39 MIN: ICD-10-PCS | Mod: S$PBB,,, | Performed by: FAMILY MEDICINE

## 2022-05-26 PROCEDURE — 99214 OFFICE O/P EST MOD 30 MIN: CPT | Mod: S$PBB,,, | Performed by: FAMILY MEDICINE

## 2022-05-26 PROCEDURE — 84550 ASSAY OF BLOOD/URIC ACID: CPT | Performed by: FAMILY MEDICINE

## 2022-05-26 PROCEDURE — 99213 OFFICE O/P EST LOW 20 MIN: CPT | Mod: PBBFAC,27 | Performed by: PODIATRIST

## 2022-05-26 PROCEDURE — 99999 PR PBB SHADOW E&M-EST. PATIENT-LVL IV: ICD-10-PCS | Mod: PBBFAC,,, | Performed by: FAMILY MEDICINE

## 2022-05-26 PROCEDURE — 99203 OFFICE O/P NEW LOW 30 MIN: CPT | Mod: 25,S$PBB,, | Performed by: PODIATRIST

## 2022-05-26 PROCEDURE — 99999 PR PBB SHADOW E&M-EST. PATIENT-LVL III: ICD-10-PCS | Mod: PBBFAC,,, | Performed by: PODIATRIST

## 2022-05-26 PROCEDURE — 99214 OFFICE O/P EST MOD 30 MIN: CPT | Mod: PBBFAC | Performed by: FAMILY MEDICINE

## 2022-05-26 PROCEDURE — 11721 PR DEBRIDEMENT OF NAILS, 6 OR MORE: ICD-10-PCS | Mod: S$PBB,,, | Performed by: PODIATRIST

## 2022-05-26 PROCEDURE — 11721 DEBRIDE NAIL 6 OR MORE: CPT | Mod: PBBFAC | Performed by: PODIATRIST

## 2022-05-26 PROCEDURE — 84443 ASSAY THYROID STIM HORMONE: CPT | Performed by: FAMILY MEDICINE

## 2022-05-26 RX ORDER — LATANOPROST 50 UG/ML
1 SOLUTION/ DROPS OPHTHALMIC NIGHTLY
COMMUNITY
Start: 2022-05-18

## 2022-05-26 RX ORDER — LOSARTAN POTASSIUM AND HYDROCHLOROTHIAZIDE 12.5; 1 MG/1; MG/1
1 TABLET ORAL DAILY
Qty: 30 TABLET | Refills: 3 | Status: SHIPPED | OUTPATIENT
Start: 2022-05-26 | End: 2022-10-15

## 2022-05-26 NOTE — PROGRESS NOTES
PODIATRIC MEDICINE AND SURGERY   Mary Corbett MD    CHIEF COMPLAINT  Chief Complaint   Patient presents with    Nail Care     Nail Care, 0 pain, Non diabetic wears sandals, Last Seen PCP Dr. Mary Corbett 05/26/2022         HPI    SUBJECTIVE: Henna Jorge is a 89 y.o. female who  has a past medical history of Arthritis, Back pain, Cancer, Disorder of kidney and ureter, Glaucoma, Gout, chronic, Hypertension, Osteopenia, and Osteoporosis. Henna presenting to podiatry clinic with complaint of thickened, discolored, and painful toenails. Pt states nails result in pain with enclosed shoe wear aggravated due to pressure  and/or with ambulation. They are unable to trim nails. They are requesting nail care for relief of painful symptoms. Patient has no further pedal complaints.      PMH  Past Medical History:   Diagnosis Date    Arthritis     Back pain     sometimes    Cancer     hx of cervical    Disorder of kidney and ureter     Glaucoma     Gout, chronic     Hypertension     Osteopenia     Osteoporosis      Patient Active Problem List   Diagnosis    Essential hypertension    Osteoporosis    Gout, chronic    Primary osteoarthritis of both knees    History of cervical cancer    Obesity (BMI 30.0-34.9)    Stage 3a chronic kidney disease    Chronic seasonal allergic rhinitis due to pollen    Osteoarthritis of spine with radiculopathy, lumbar region    Primary osteoarthritis of both hips    Primary open-angle glaucoma, right eye, severe stage    Primary open-angle glaucoma, left eye, mild stage       MEDS  Current Outpatient Medications on File Prior to Visit   Medication Sig Dispense Refill    acetaminophen (TYLENOL ARTHRITIS ORAL) Take by mouth daily as needed.      alendronate (FOSAMAX) 70 MG tablet TAKE 1 TABLET BY MOUTH EVERY 7 DAYS 4 tablet 0    allopurinoL (ZYLOPRIM) 100 MG tablet TAKE 2 TABLETS(200 MG) BY MOUTH EVERY DAY 60 tablet 0    cetirizine (ZYRTEC) 10 MG tablet TAKE 1 TABLET(10  "MG) BY MOUTH EVERY DAY 30 tablet 0    cholecalciferol, vitamin D3, 125 mcg (5,000 unit) Tab Take 5,000 Units by mouth once daily.      COLCRYS 0.6 mg tablet TAKE 1 TABLET BY MOUTH TWICE DAILY AS NEEDED FOR GOUT ATTACK 30 tablet 2    dorzolamide-timolol 2-0.5% (COSOPT) 2-0.5 % ophthalmic solution       latanoprost 0.005 % ophthalmic solution Place 1 drop into both eyes nightly.      losartan-hydrochlorothiazide 100-12.5 mg (HYZAAR) 100-12.5 mg Tab Take 1 tablet by mouth once daily. 30 tablet 3    meloxicam (MOBIC) 7.5 MG tablet Take 1 tablet (7.5 mg total) by mouth daily as needed. 90 tablet 2    TRAVATAN Z 0.004 % Drop       [DISCONTINUED] lisinopriL-hydrochlorothiazide (PRINZIDE,ZESTORETIC) 20-12.5 mg per tablet TAKE 1 TABLET BY MOUTH EVERY DAY 30 tablet 0     No current facility-administered medications on file prior to visit.       PSH     Past Surgical History:   Procedure Laterality Date    EYE SURGERY      glaucoma    TONSILLECTOMY      TOTAL ABDOMINAL HYSTERECTOMY          ALL  Review of patient's allergies indicates:  No Known Allergies    SOC     Social History     Tobacco Use    Smoking status: Former Smoker    Smokeless tobacco: Never Used   Substance Use Topics    Alcohol use: No    Drug use: No         Family HX    Family History   Problem Relation Age of Onset    Hypertension Mother     Melanoma Neg Hx             REVIEW OF SYSTEMS  General: Denies any fever or chills  Chest: Denies shortness of breath, wheezing, coughing, or sputum production  Heart: Denies chest pain.  As noted above and per history of current illness above, otherwise negative in the remainder of the 14 systems.     PHYSICAL EXAM  Vitals:    05/26/22 1311   Weight: 87.5 kg (193 lb)   Height: 5' 4" (1.626 m)   PainSc: 0-No pain       GEN:  This patient is well-developed, well-nourished and appears stated age, well-oriented to person, place and time, and cooperative and pleasant on today's visit.      LOWER " EXTREMITY    VASCULAR  DP pedal pulse 2/4 RIGHT, LEFT2/4   PT pedal pulse 2/4 RIGHT, LEFT2/4  Capillary refill time immediate to the toes.   Feet are warm to the touch. Skin temperature warm to warm from proximally to distally   There are no varicosities, telangiectasias noted to bilateral foot and ankle regions.   There are no ecchymoses noted to bilateral foot and ankle regions.   There is no gross lower extremity edema.    DERMATOLOGIC  Skin moist with healthy texture and turgor.  Thickened, dystrophic, elongated, discolored toenails with subungal debris 1,2, 3, 4, 5 RIGHT FOOT, 1, 2, 3, 4 , 5 LEFT FOOT   There are no open ulcerations, lacerations, or fissures to bilateral foot and ankle regions. There are no signs of infection as there is no erythema, no proximal-extending lymphangiitis, no fluctuance, or crepitus noted on palpation to bilateral foot and ankle regions.   There is no interdigital maceration.   There are hyperkeratotic lesions noted to feet.    NEUROLOGIC  Neurological sensation is grossly intact to all sites tested    ORTHOPEDIC/BIOMECHANICAL  No symptomatic structural abnormalities noted. Muscle strength is 5/5 for foot inverters, everters, plantarflexors, and dorsiflexors. Muscle tone is normal.   Inspection/palpation of bone, joints and muscles unremarkable.    ASSESSMENT  Pain due to onychomycosis of toenails of both feet    Nail disorder (onychogryphosis)        PLAN  -The patient was examined and evaulated. Patient was given verbal instructions on maintenance care for mycotic nails. The need for proper skin care in order to prevent infection and colonization in the nails was explained to the patient.    - The nails are painful with applied pressure and shoe gear. If a mycotic infection is left untreated, the infection could spread, causing marked limitation of ambulation, and/or make the patient prone to secondary infection. Failure to debride the nails at necessary intervals could likely  cause recurrence of pain.   -I recommend Vicks vapor topically once daily to affected toenails. This is a natural antifungal regimen that will help in reduction of fungal load and will also soften the nail to allow for easier debridements.   -With patient's permission, the elongated onychomycotic toenails, as outlined in the physical examination, were sharply debrided with a double action nail nipper to their soft tissue attachment. If indicated, the nails were then smoothed down in thickness with an nuvia board to facilitate in further debridement removing all offending nail and subungual debris. Patient relates relief following the procedure.       Disclaimer: This note was partially prepared using a voice recognition system and is likely to have sound alike errors within the text.        Future Appointments   Date Time Provider Department Center   11/28/2022  1:20 PM Mary Corbett MD [facility] [place]       Report Electronically Signed By:     Marika Bean DPM   Podiatry  [facility]-   5/26/2022

## 2022-05-26 NOTE — PROGRESS NOTES
"Subjective:       Patient ID: Henna Jorge is a 89 y.o. female.    Chief Complaint: Annual Exam    89-year-old  female patient accompanied by her daughter with Patient Active Problem List:     Essential hypertension     Osteoporosis     Gout, chronic     Primary osteoarthritis of both knees     History of cervical cancer     Obesity (BMI 30.0-34.9)     Stage 3a chronic kidney disease     Chronic seasonal allergic rhinitis due to pollen     Osteoarthritis of spine with radiculopathy, lumbar region     Primary osteoarthritis of both hips     Primary open-angle glaucoma, right eye, severe stage     Primary open-angle glaucoma, left eye, mild stage  Here for follow-up on chronic medical conditions and reports that patient has been taking her medications regularly and requesting refills on few medications today but has been having ongoing dry cough.  Denies any chest pain or difficulty breathing with palpitations and arthritis has been stable.  Has not been able to exercise lately secondary to heat.   Reports that she has been taking allopurinol 200 mg daily and would like to have a refill and denies any recent gout exacerbations lately.     Review of Systems   Constitutional: Negative for fatigue, fever and unexpected weight change.   HENT: Negative for congestion.    Eyes: Negative for visual disturbance.   Respiratory: Positive for cough. Negative for shortness of breath.    Cardiovascular: Negative for chest pain and leg swelling.   Gastrointestinal: Negative for abdominal pain, nausea and vomiting.   Musculoskeletal: Positive for arthralgias. Negative for myalgias.   Skin: Negative for rash.   Neurological: Negative for light-headedness and headaches.   Psychiatric/Behavioral: Negative for sleep disturbance.         /70 (BP Location: Left arm, Patient Position: Sitting, BP Method: Large (Manual))   Pulse 69   Resp 16   Ht 5' 4" (1.626 m)   Wt 87.6 kg (193 lb 2 oz)   SpO2 97%   BMI " 33.15 kg/m²   Objective:      Physical Exam  Constitutional:       Appearance: She is well-developed.   HENT:      Head: Normocephalic and atraumatic.   Cardiovascular:      Rate and Rhythm: Normal rate and regular rhythm.      Heart sounds: Normal heart sounds. No murmur heard.  Pulmonary:      Effort: Pulmonary effort is normal. No respiratory distress.      Breath sounds: Normal breath sounds. No wheezing.   Abdominal:      General: Bowel sounds are normal.      Palpations: Abdomen is soft.      Tenderness: There is no abdominal tenderness.   Skin:     General: Skin is warm and dry.      Findings: No rash.   Neurological:      Mental Status: She is alert and oriented to person, place, and time.   Psychiatric:         Mood and Affect: Mood normal.           Assessment/Plan:   1. Essential hypertension  - Comprehensive Metabolic Panel; Future  - Lipid Panel; Future  - TSH; Future  - Urinalysis, Reflex to Urine Culture Urine, Clean Catch; Future  - losartan-hydrochlorothiazide 100-12.5 mg (HYZAAR) 100-12.5 mg Tab; Take 1 tablet by mouth once daily.  Dispense: 30 tablet; Refill: 3  Secondary to persistent dry cough will consider changing lisinopril hydrochlorothiazide 20/12.5 mg to losartan hydrochlorothiazide 100/12.5 mg daily  Patient was advised to start monitoring blood pressure trends and restrict salt intake    2. Stage 3a chronic kidney disease  - CBC Auto Differential; Future  - Comprehensive Metabolic Panel; Future  Encouraged to drink adequate fluids    3. Idiopathic chronic gout of foot without tophus, unspecified laterality  - Uric Acid; Future  Currently taking allopurinol 200 mg daily  Will check uric acid levels and will consider refill accordingly    4. Age-related osteoporosis without current pathological fracture  Stable on Fosamax weekly and vitamin D3 5000 units daily    5. Primary osteoarthritis of both knees  7. Osteoarthritis of spine with radiculopathy, lumbar region  8. Primary osteoarthritis  of both hips  Graded exercise regimen recommended    6. Chronic seasonal allergic rhinitis due to pollen  - CBC Auto Differential; Future  Stable on Zyrtec    9. History of cervical cancer    10. Obesity (BMI 30.0-34.9)   Lifestyle modifications recommended to lose weight with BMI 33

## 2022-05-27 DIAGNOSIS — M1A.0790 IDIOPATHIC CHRONIC GOUT OF FOOT WITHOUT TOPHUS, UNSPECIFIED LATERALITY: Primary | ICD-10-CM

## 2022-05-27 RX ORDER — ALLOPURINOL 300 MG/1
300 TABLET ORAL DAILY
Qty: 90 TABLET | Refills: 1 | Status: SHIPPED | OUTPATIENT
Start: 2022-05-27 | End: 2022-12-02

## 2022-07-21 DIAGNOSIS — M81.0 AGE-RELATED OSTEOPOROSIS WITHOUT CURRENT PATHOLOGICAL FRACTURE: ICD-10-CM

## 2022-07-21 RX ORDER — ALENDRONATE SODIUM 70 MG/1
TABLET ORAL
Qty: 12 TABLET | OUTPATIENT
Start: 2022-07-21

## 2022-07-21 NOTE — TELEPHONE ENCOUNTER
Refill Decision Note   Henna Jorge  is requesting a refill authorization.  Brief Assessment and Rationale for Refill:  Quick Discontinue     Medication Therapy Plan:  ORDERED 7/20/22    Medication Reconciliation Completed: No   Comments:     No Care Gaps recommended.     Note composed:8:49 AM 07/21/2022

## 2022-09-20 ENCOUNTER — OFFICE VISIT (OUTPATIENT)
Dept: PODIATRY | Facility: CLINIC | Age: 87
End: 2022-09-20
Payer: MEDICARE

## 2022-09-20 VITALS — HEIGHT: 64 IN | BODY MASS INDEX: 32.93 KG/M2 | WEIGHT: 192.88 LBS

## 2022-09-20 DIAGNOSIS — B35.1 ONYCHOMYCOSIS: Primary | ICD-10-CM

## 2022-09-20 DIAGNOSIS — M79.674 PAIN DUE TO ONYCHOMYCOSIS OF TOENAILS OF BOTH FEET: ICD-10-CM

## 2022-09-20 DIAGNOSIS — N18.31 STAGE 3A CHRONIC KIDNEY DISEASE: ICD-10-CM

## 2022-09-20 DIAGNOSIS — L84 CALLUS: ICD-10-CM

## 2022-09-20 DIAGNOSIS — B35.1 PAIN DUE TO ONYCHOMYCOSIS OF TOENAILS OF BOTH FEET: ICD-10-CM

## 2022-09-20 DIAGNOSIS — M79.675 PAIN DUE TO ONYCHOMYCOSIS OF TOENAILS OF BOTH FEET: ICD-10-CM

## 2022-09-20 PROCEDURE — 99499 UNLISTED E&M SERVICE: CPT | Mod: S$PBB,,, | Performed by: PODIATRIST

## 2022-09-20 PROCEDURE — 11721 DEBRIDE NAIL 6 OR MORE: CPT | Mod: S$PBB,,, | Performed by: PODIATRIST

## 2022-09-20 PROCEDURE — 99213 OFFICE O/P EST LOW 20 MIN: CPT | Mod: PBBFAC | Performed by: PODIATRIST

## 2022-09-20 PROCEDURE — 11721 PR DEBRIDEMENT OF NAILS, 6 OR MORE: ICD-10-PCS | Mod: S$PBB,,, | Performed by: PODIATRIST

## 2022-09-20 PROCEDURE — 99999 PR PBB SHADOW E&M-EST. PATIENT-LVL III: CPT | Mod: PBBFAC,,, | Performed by: PODIATRIST

## 2022-09-20 PROCEDURE — 11721 DEBRIDE NAIL 6 OR MORE: CPT | Mod: PBBFAC | Performed by: PODIATRIST

## 2022-09-20 PROCEDURE — 99499 NO LOS: ICD-10-PCS | Mod: S$PBB,,, | Performed by: PODIATRIST

## 2022-09-20 PROCEDURE — 99999 PR PBB SHADOW E&M-EST. PATIENT-LVL III: ICD-10-PCS | Mod: PBBFAC,,, | Performed by: PODIATRIST

## 2022-09-20 NOTE — PROGRESS NOTES
Subjective:       Patient ID: Henna Jorge is a 89 y.o. female.    Chief Complaint: Nail Care (0/10 pain at present, callouses, non-diabetic, no pcp)    HPI: Patient presents to the office with the chief complaint of painful, elongated, thickened and dystrophic nail plates to the B/L foot. This patient's PMD is Mary Corbett MD. This patient last saw his/her primary care provider on 5/26. Daughter is present.     No results found for: HGBA1C.    Review of patient's allergies indicates:  No Known Allergies    Past Medical History:   Diagnosis Date    Arthritis     Back pain     sometimes    Cancer     hx of cervical    Disorder of kidney and ureter     Glaucoma     Gout, chronic     Hypertension     Osteopenia     Osteoporosis        Family History   Problem Relation Age of Onset    Hypertension Mother     Melanoma Neg Hx        Social History     Socioeconomic History    Marital status: Single    Number of children: 6   Tobacco Use    Smoking status: Former    Smokeless tobacco: Never   Substance and Sexual Activity    Alcohol use: No    Drug use: No    Sexual activity: Never   Other Topics Concern    Are you pregnant or think you may be? No    Breast-feeding No   Social History Narrative    Lives alone       Past Surgical History:   Procedure Laterality Date    EYE SURGERY      glaucoma    TONSILLECTOMY      TOTAL ABDOMINAL HYSTERECTOMY         Review of Systems   Constitutional:  Negative for chills, fatigue and fever.   HENT:  Negative for hearing loss.    Eyes:  Negative for photophobia and visual disturbance.   Respiratory:  Negative for cough, chest tightness, shortness of breath and wheezing.    Cardiovascular:  Positive for leg swelling. Negative for chest pain and palpitations.   Gastrointestinal:  Negative for constipation, diarrhea, nausea and vomiting.   Endocrine: Negative for cold intolerance and heat intolerance.   Genitourinary:  Negative for flank pain.   Musculoskeletal:  Negative for neck pain  "and neck stiffness.   Neurological:  Negative for light-headedness and headaches.   Psychiatric/Behavioral:  Negative for sleep disturbance.         Objective:   Ht 5' 4" (1.626 m)   Wt 87.5 kg (192 lb 14.4 oz)   BMI 33.11 kg/m²     Physical Exam    LOWER EXTREMITY PHYSICAL EXAMINATION    NEUROLOGY: Sensation to light touch is intact on the left and right foot. Proprioception is intact, bilateral. Sensation to pin prick is intact.    DERMATOLOGY: On the left foot, nails 1, 2, 3, 4, and 5 are suggestive of onychomycotic changes. On the right foot, nails 1, 2, 3, 4, and 5 are suggestive of onychomycotic changes. These nail plates are thickened, are dystrophic, chaulky in appearance and malodorous with substantial subungual debris. These nail plates are yellow to brown in appearance. The remaining nail plates are elongated and do not have suggestive clinical features of onychomycosis. Callus, medial border of the hallux, B/L.    VASCULAR: The B/L LE DP and PT are 2/4. CFT is WNL. Warm to warm proximal to distal. Venous insufficiency is noted, B/L.     Assessment:     1. Onychomycosis    2. Pain due to onychomycosis of toenails of both feet    3. Callus    4. Stage 3a chronic kidney disease        Plan:     Onychomycosis  Pain due to onychomycosis of toenails of both feet  Onychomycotic nail plates, as outlined above, are sharply debrided with double action nail nipper, and/or with the assistance of a mechanical rotary kristal for reduction of pains. Nails are reduced in terms of length, width and girth with removal of subungual debris to facilitate pain free weight bearing and ambulation. Elongated nails as outlined in the objective portion of this note, were trimmed to appropriate length for alleviation/reduction of pains as well. Follow up in approx. 9-12 weeks.    Callus  Hypertrophic skin formation, as outlined within the examination portion of this note, is surgically debrided with sharp #10/#15 blade, to alleviate " discomfort with weight bearing and ambulation, and to lessen the possibility of skin complications, e.g., ulceration due to pressure. No ulceration(s) is are noted with/post debridement. The lesion is completed healed and resolved. No evidence of infection.     Stage 3a chronic kidney disease  Patient advised to follow up with Primary Care Provider and/or Nephrologist for management of kidney pathology.             Future Appointments   Date Time Provider Department Center   11/28/2022  1:20 PM MD JEAN Fuentes IM [place]   1/9/2023 11:00 AM RYAN Chong POD [place]

## 2022-12-06 ENCOUNTER — NURSE TRIAGE (OUTPATIENT)
Dept: ADMINISTRATIVE | Facility: CLINIC | Age: 87
End: 2022-12-06
Payer: MEDICARE

## 2022-12-06 DIAGNOSIS — M1A.0790 IDIOPATHIC CHRONIC GOUT OF FOOT WITHOUT TOPHUS, UNSPECIFIED LATERALITY: ICD-10-CM

## 2022-12-06 RX ORDER — ALLOPURINOL 300 MG/1
300 TABLET ORAL DAILY
Qty: 90 TABLET | Refills: 1 | Status: SHIPPED | OUTPATIENT
Start: 2022-12-06

## 2022-12-06 RX ORDER — COLCHICINE 0.6 MG/1
TABLET, FILM COATED ORAL
Qty: 30 TABLET | Refills: 2 | Status: SHIPPED | OUTPATIENT
Start: 2022-12-06 | End: 2022-12-07

## 2022-12-06 NOTE — TELEPHONE ENCOUNTER
----- Message from Carly Whelan sent at 12/6/2022  4:39 PM CST -----  Contact: Clare (daughter )  Clare stated that pt is having severe gout problems, asked if any medication can be sent to pharmacy       [id number] - [place], [place] - [address] AT [facility] OF [address]  [address]  [place] [address]  Phone: [phone] Fax: [phone]      Please call  her back

## 2022-12-06 NOTE — TELEPHONE ENCOUNTER
----- Message from Pippa Jorge sent at 5/5/2022 10:02 AM CDT -----  Contact: pt daughter - trina ta  Type:  Sooner Apoointment Request    Caller is requesting a sooner appointment.  Caller declined first available appointment listed below.  Caller will not accept being placed on the waitlist and is requesting a message be sent to doctor.  Name of Caller: pt daughter   When is the first available appointment?06/07  Symptoms: phys  Would the patient rather a call back or a response via [facility]? phone  Best Call Back Number: [phone]  Additional Information:       
Spoke with pt's daughter. Scheduled pt for 05/26/22 at 9:20 am. Call ended well.  
Joey Lewis

## 2022-12-06 NOTE — TELEPHONE ENCOUNTER
Clare stated that pt's big right toe is swollen and pt is having trouble walking. Stated that pt is having a severe gout attack for the last few days. Daughter stated that pt can not walk and asking how to get pt to hosp. Advised to call EMS. Pt stated that this has happened before and refusing to be seen today. Encounter routed to provider.      Reason for Disposition   SEVERE pain (e.g., excruciating, unable to do any normal activities)    Additional Information   Negative: Entire foot is cool or blue in comparison to other foot   Negative: Purple or black skin on foot or toe   Negative: Red area or streak and fever   Negative: Swollen foot and fever   Negative: Patient sounds very sick or weak to the triager    Protocols used: Foot Pain-A-[place]

## 2022-12-06 NOTE — TELEPHONE ENCOUNTER
Tried to contact pt daughter to inform that refill request was sent to provider for fulfillment./Tonia

## 2022-12-06 NOTE — TELEPHONE ENCOUNTER
No new care gaps identified.  [facility] Embedded Care Gaps. Reference number: [id number]. 12/06/2022   5:46:22 PM CST

## 2022-12-07 ENCOUNTER — PATIENT MESSAGE (OUTPATIENT)
Dept: INTERNAL MEDICINE | Facility: CLINIC | Age: 87
End: 2022-12-07
Payer: MEDICARE

## 2022-12-07 ENCOUNTER — TELEPHONE (OUTPATIENT)
Dept: ADMINISTRATIVE | Facility: HOSPITAL | Age: 87
End: 2022-12-07
Payer: MEDICARE

## 2022-12-07 DIAGNOSIS — M10.9 GOUT ATTACK: ICD-10-CM

## 2022-12-07 DIAGNOSIS — M10.9 ACUTE GOUT INVOLVING TOE, UNSPECIFIED CAUSE, UNSPECIFIED LATERALITY: Primary | ICD-10-CM

## 2022-12-07 RX ORDER — COLCHICINE 0.6 MG/1
0.6 TABLET ORAL 2 TIMES DAILY PRN
Qty: 30 TABLET | Refills: 2 | Status: SHIPPED | OUTPATIENT
Start: 2022-12-07

## 2022-12-16 ENCOUNTER — TELEPHONE (OUTPATIENT)
Dept: INTERNAL MEDICINE | Facility: CLINIC | Age: 87
End: 2022-12-16
Payer: MEDICARE

## 2022-12-16 ENCOUNTER — HOSPITAL ENCOUNTER (OUTPATIENT)
Facility: HOSPITAL | Age: 87
Discharge: HOME | End: 2022-12-17
Attending: EMERGENCY MEDICINE | Admitting: HOSPITALIST
Payer: MEDICARE

## 2022-12-16 DIAGNOSIS — M25.562 CHRONIC PAIN OF LEFT KNEE: ICD-10-CM

## 2022-12-16 DIAGNOSIS — R07.9 CHEST PAIN: ICD-10-CM

## 2022-12-16 DIAGNOSIS — G89.29 CHRONIC PAIN OF LEFT KNEE: ICD-10-CM

## 2022-12-16 DIAGNOSIS — R60.9 EDEMA: ICD-10-CM

## 2022-12-16 DIAGNOSIS — I16.0 HYPERTENSIVE URGENCY: Primary | ICD-10-CM

## 2022-12-16 DIAGNOSIS — I16.1 HYPERTENSIVE EMERGENCY: ICD-10-CM

## 2022-12-16 DIAGNOSIS — M54.50 LOW BACK PAIN RADIATING TO LEFT LOWER EXTREMITY: ICD-10-CM

## 2022-12-16 DIAGNOSIS — M79.605 LOW BACK PAIN RADIATING TO LEFT LOWER EXTREMITY: ICD-10-CM

## 2022-12-16 DIAGNOSIS — I10 HYPERTENSION: ICD-10-CM

## 2022-12-16 DIAGNOSIS — M79.676 PAIN AROUND TOENAIL: ICD-10-CM

## 2022-12-16 LAB
ALBUMIN SERPL BCP-MCNC: 3.2 G/DL (ref 3.5–5.2)
ALP SERPL-CCNC: 63 U/L (ref 55–135)
ALT SERPL W/O P-5'-P-CCNC: 16 U/L (ref 10–44)
ANION GAP SERPL CALC-SCNC: 12 MMOL/L (ref 8–16)
AST SERPL-CCNC: 20 U/L (ref 10–40)
BASOPHILS # BLD AUTO: 0.02 K/UL (ref 0–0.2)
BASOPHILS NFR BLD: 0.3 % (ref 0–1.9)
BILIRUB SERPL-MCNC: 0.3 MG/DL (ref 0.1–1)
BNP SERPL-MCNC: 701 PG/ML (ref 0–99)
BUN SERPL-MCNC: 18 MG/DL (ref 8–23)
CALCIUM SERPL-MCNC: 9.3 MG/DL (ref 8.7–10.5)
CHLORIDE SERPL-SCNC: 110 MMOL/L (ref 95–110)
CO2 SERPL-SCNC: 22 MMOL/L (ref 23–29)
CREAT SERPL-MCNC: 1 MG/DL (ref 0.5–1.4)
DIFFERENTIAL METHOD: NORMAL
EOSINOPHIL # BLD AUTO: 0 K/UL (ref 0–0.5)
EOSINOPHIL NFR BLD: 0.7 % (ref 0–8)
ERYTHROCYTE [DISTWIDTH] IN BLOOD BY AUTOMATED COUNT: 14.5 % (ref 11.5–14.5)
EST. GFR  (NO RACE VARIABLE): 54 ML/MIN/1.73 M^2
GLUCOSE SERPL-MCNC: 100 MG/DL (ref 70–110)
HCT VFR BLD AUTO: 37.4 % (ref 37–48.5)
HGB BLD-MCNC: 12.1 G/DL (ref 12–16)
IMM GRANULOCYTES # BLD AUTO: 0.01 K/UL (ref 0–0.04)
IMM GRANULOCYTES NFR BLD AUTO: 0.2 % (ref 0–0.5)
LYMPHOCYTES # BLD AUTO: 2 K/UL (ref 1–4.8)
LYMPHOCYTES NFR BLD: 32.6 % (ref 18–48)
MCH RBC QN AUTO: 27.1 PG (ref 27–31)
MCHC RBC AUTO-ENTMCNC: 32.4 G/DL (ref 32–36)
MCV RBC AUTO: 84 FL (ref 82–98)
MONOCYTES # BLD AUTO: 0.4 K/UL (ref 0.3–1)
MONOCYTES NFR BLD: 6.9 % (ref 4–15)
NEUTROPHILS # BLD AUTO: 3.6 K/UL (ref 1.8–7.7)
NEUTROPHILS NFR BLD: 59.3 % (ref 38–73)
NRBC BLD-RTO: 0 /100 WBC
PLATELET # BLD AUTO: 336 K/UL (ref 150–450)
PMV BLD AUTO: 10.1 FL (ref 9.2–12.9)
POTASSIUM SERPL-SCNC: 3.7 MMOL/L (ref 3.5–5.1)
PROT SERPL-MCNC: 7.1 G/DL (ref 6–8.4)
RBC # BLD AUTO: 4.47 M/UL (ref 4–5.4)
SODIUM SERPL-SCNC: 144 MMOL/L (ref 136–145)
TROPONIN I SERPL DL<=0.01 NG/ML-MCNC: 0.04 NG/ML (ref 0–0.03)
TROPONIN I SERPL DL<=0.01 NG/ML-MCNC: 0.06 NG/ML (ref 0–0.03)
TROPONIN I SERPL DL<=0.01 NG/ML-MCNC: 0.07 NG/ML (ref 0–0.03)
WBC # BLD AUTO: 6.07 K/UL (ref 3.9–12.7)

## 2022-12-16 PROCEDURE — 99291 CRITICAL CARE FIRST HOUR: CPT | Mod: 25

## 2022-12-16 PROCEDURE — 96375 TX/PRO/DX INJ NEW DRUG ADDON: CPT

## 2022-12-16 PROCEDURE — 85025 COMPLETE CBC W/AUTO DIFF WBC: CPT | Performed by: NURSE PRACTITIONER

## 2022-12-16 PROCEDURE — 93010 ELECTROCARDIOGRAM REPORT: CPT | Mod: ,,, | Performed by: INTERNAL MEDICINE

## 2022-12-16 PROCEDURE — G0378 HOSPITAL OBSERVATION PER HR: HCPCS

## 2022-12-16 PROCEDURE — 93010 EKG 12-LEAD: ICD-10-PCS | Mod: ,,, | Performed by: INTERNAL MEDICINE

## 2022-12-16 PROCEDURE — 80053 COMPREHEN METABOLIC PANEL: CPT | Performed by: NURSE PRACTITIONER

## 2022-12-16 PROCEDURE — 96374 THER/PROPH/DIAG INJ IV PUSH: CPT

## 2022-12-16 PROCEDURE — 36415 COLL VENOUS BLD VENIPUNCTURE: CPT | Performed by: NURSE PRACTITIONER

## 2022-12-16 PROCEDURE — 84484 ASSAY OF TROPONIN QUANT: CPT | Mod: 91 | Performed by: EMERGENCY MEDICINE

## 2022-12-16 PROCEDURE — 63600175 PHARM REV CODE 636 W HCPCS: Performed by: EMERGENCY MEDICINE

## 2022-12-16 PROCEDURE — 93005 ELECTROCARDIOGRAM TRACING: CPT

## 2022-12-16 PROCEDURE — 84484 ASSAY OF TROPONIN QUANT: CPT | Mod: 91 | Performed by: NURSE PRACTITIONER

## 2022-12-16 PROCEDURE — 83880 ASSAY OF NATRIURETIC PEPTIDE: CPT | Performed by: NURSE PRACTITIONER

## 2022-12-16 PROCEDURE — 84484 ASSAY OF TROPONIN QUANT: CPT | Performed by: NURSE PRACTITIONER

## 2022-12-16 RX ORDER — FUROSEMIDE 10 MG/ML
40 INJECTION INTRAMUSCULAR; INTRAVENOUS
Status: COMPLETED | OUTPATIENT
Start: 2022-12-16 | End: 2022-12-16

## 2022-12-16 RX ORDER — TALC
6 POWDER (GRAM) TOPICAL NIGHTLY PRN
Status: DISCONTINUED | OUTPATIENT
Start: 2022-12-16 | End: 2022-12-17 | Stop reason: HOSPADM

## 2022-12-16 RX ORDER — IBUPROFEN 200 MG
16 TABLET ORAL
Status: DISCONTINUED | OUTPATIENT
Start: 2022-12-16 | End: 2022-12-17 | Stop reason: HOSPADM

## 2022-12-16 RX ORDER — SODIUM CHLORIDE 0.9 % (FLUSH) 0.9 %
10 SYRINGE (ML) INJECTION EVERY 12 HOURS PRN
Status: DISCONTINUED | OUTPATIENT
Start: 2022-12-16 | End: 2022-12-17 | Stop reason: HOSPADM

## 2022-12-16 RX ORDER — ACETAMINOPHEN 325 MG/1
650 TABLET ORAL EVERY 4 HOURS PRN
Status: DISCONTINUED | OUTPATIENT
Start: 2022-12-16 | End: 2022-12-17 | Stop reason: HOSPADM

## 2022-12-16 RX ORDER — LATANOPROST 50 UG/ML
1 SOLUTION/ DROPS OPHTHALMIC NIGHTLY
Status: DISCONTINUED | OUTPATIENT
Start: 2022-12-16 | End: 2022-12-17 | Stop reason: HOSPADM

## 2022-12-16 RX ORDER — LOSARTAN POTASSIUM 50 MG/1
100 TABLET ORAL DAILY
Status: DISCONTINUED | OUTPATIENT
Start: 2022-12-17 | End: 2022-12-17 | Stop reason: HOSPADM

## 2022-12-16 RX ORDER — MAG HYDROX/ALUMINUM HYD/SIMETH 200-200-20
30 SUSPENSION, ORAL (FINAL DOSE FORM) ORAL 4 TIMES DAILY PRN
Status: DISCONTINUED | OUTPATIENT
Start: 2022-12-16 | End: 2022-12-17 | Stop reason: HOSPADM

## 2022-12-16 RX ORDER — SIMETHICONE 80 MG
1 TABLET,CHEWABLE ORAL 4 TIMES DAILY PRN
Status: DISCONTINUED | OUTPATIENT
Start: 2022-12-16 | End: 2022-12-17 | Stop reason: HOSPADM

## 2022-12-16 RX ORDER — LOSARTAN POTASSIUM AND HYDROCHLOROTHIAZIDE 12.5; 1 MG/1; MG/1
1 TABLET ORAL DAILY
Status: DISCONTINUED | OUTPATIENT
Start: 2022-12-17 | End: 2022-12-16 | Stop reason: CLARIF

## 2022-12-16 RX ORDER — DORZOLAMIDE HYDROCHLORIDE AND TIMOLOL MALEATE 20; 5 MG/ML; MG/ML
1 SOLUTION/ DROPS OPHTHALMIC 2 TIMES DAILY
Status: DISCONTINUED | OUTPATIENT
Start: 2022-12-16 | End: 2022-12-17 | Stop reason: HOSPADM

## 2022-12-16 RX ORDER — HYDROCHLOROTHIAZIDE 12.5 MG/1
12.5 TABLET ORAL DAILY
Status: DISCONTINUED | OUTPATIENT
Start: 2022-12-17 | End: 2022-12-16

## 2022-12-16 RX ORDER — ONDANSETRON 2 MG/ML
4 INJECTION INTRAMUSCULAR; INTRAVENOUS EVERY 6 HOURS PRN
Status: DISCONTINUED | OUTPATIENT
Start: 2022-12-16 | End: 2022-12-17 | Stop reason: HOSPADM

## 2022-12-16 RX ORDER — ACETAMINOPHEN 500 MG
5000 TABLET ORAL DAILY
Status: DISCONTINUED | OUTPATIENT
Start: 2022-12-17 | End: 2022-12-17 | Stop reason: HOSPADM

## 2022-12-16 RX ORDER — CLONIDINE HYDROCHLORIDE 0.1 MG/1
0.1 TABLET ORAL
Status: DISCONTINUED | OUTPATIENT
Start: 2022-12-16 | End: 2022-12-16

## 2022-12-16 RX ORDER — HYDROCHLOROTHIAZIDE 12.5 MG/1
12.5 TABLET ORAL DAILY
Status: ACTIVE | OUTPATIENT
Start: 2022-12-16 | End: 2022-12-17

## 2022-12-16 RX ORDER — IBUPROFEN 200 MG
24 TABLET ORAL
Status: DISCONTINUED | OUTPATIENT
Start: 2022-12-16 | End: 2022-12-17 | Stop reason: HOSPADM

## 2022-12-16 RX ORDER — ALLOPURINOL 300 MG/1
300 TABLET ORAL DAILY
Status: DISCONTINUED | OUTPATIENT
Start: 2022-12-17 | End: 2022-12-17 | Stop reason: HOSPADM

## 2022-12-16 RX ORDER — LOSARTAN POTASSIUM 50 MG/1
100 TABLET ORAL DAILY
Status: ACTIVE | OUTPATIENT
Start: 2022-12-16 | End: 2022-12-17

## 2022-12-16 RX ORDER — LOSARTAN POTASSIUM 50 MG/1
100 TABLET ORAL DAILY
Status: DISCONTINUED | OUTPATIENT
Start: 2022-12-17 | End: 2022-12-16

## 2022-12-16 RX ORDER — HYDROCHLOROTHIAZIDE 12.5 MG/1
12.5 TABLET ORAL DAILY
Status: DISCONTINUED | OUTPATIENT
Start: 2022-12-17 | End: 2022-12-17 | Stop reason: HOSPADM

## 2022-12-16 RX ORDER — NALOXONE HCL 0.4 MG/ML
0.02 VIAL (ML) INJECTION
Status: DISCONTINUED | OUTPATIENT
Start: 2022-12-16 | End: 2022-12-17 | Stop reason: HOSPADM

## 2022-12-16 RX ORDER — MORPHINE SULFATE 4 MG/ML
4 INJECTION, SOLUTION INTRAMUSCULAR; INTRAVENOUS
Status: COMPLETED | OUTPATIENT
Start: 2022-12-16 | End: 2022-12-16

## 2022-12-16 RX ORDER — GLUCAGON 1 MG
1 KIT INJECTION
Status: DISCONTINUED | OUTPATIENT
Start: 2022-12-16 | End: 2022-12-17 | Stop reason: HOSPADM

## 2022-12-16 RX ADMIN — FUROSEMIDE 40 MG: 10 INJECTION, SOLUTION INTRAMUSCULAR; INTRAVENOUS at 03:12

## 2022-12-16 RX ADMIN — MORPHINE SULFATE 4 MG: 4 INJECTION INTRAVENOUS at 03:12

## 2022-12-16 NOTE — TELEPHONE ENCOUNTER
S/w pt daughter and was informed that she will take the pt to the ER for the pt to be evaluation./Tonia

## 2022-12-16 NOTE — TELEPHONE ENCOUNTER
----- Message from Zakiya Capellan sent at 12/16/2022  7:58 AM CST -----  Contact: Clare  Type:  Same Day Appointment Request    Caller is requesting a same day appointment.  Caller declined first available appointment listed below.    Name of Caller: Clare  When is the first available appointment? 12/19/2022  Symptoms: entire body pain making it difficult to walk   Best Call Back Number: [phone]   Additional Information: The patient lives in [place] and need to hear back as soon as possible to get here

## 2022-12-16 NOTE — ED PROVIDER NOTES
SCRIBE #1 NOTE: I, Marisa Ordonez, am scribing for, and in the presence of, Pedro Jaimes MD. I have scribed the entire note.       History     Chief Complaint   Patient presents with    Pain     Pt c/o pain x 1 month.  Pain is located in her foot, leg, back, and hip.  Pt reports a history of gout.  Pt denies recent falls or injuries.     Review of patient's allergies indicates:  No Known Allergies      History of Present Illness     HPI    12/16/2022, 3:04 PM  History obtained from the patient      History of Present Illness: Henna Jorge is a 89 y.o. female patient with a PMHx of HTN, osteopenia, gout, arthritis, osteoporosis, and cervical cancer who presents to the Emergency Department for evaluation of pain in left great toe, knee, and hip which onset 3 weeks PTA. In triage, pt had BP of 211/124. Symptoms are constant and moderate in severity. Symptoms exacerbated by walking. Associated sxs include back pain and decreased appetite. Patient denies any fever, chills, dysuria, n/v, abd pain, and all other sxs at this time. No further complaints or concerns at this time.       Arrival mode: Personal vehicle      PCP: Mary Corbett MD        Past Medical History:  Past Medical History:   Diagnosis Date    Arthritis     Back pain     sometimes    Cancer     hx of cervical    Disorder of kidney and ureter     Glaucoma     Gout, chronic     Hypertension     Osteopenia     Osteoporosis        Past Surgical History:  Past Surgical History:   Procedure Laterality Date    EYE SURGERY      glaucoma    TONSILLECTOMY      TOTAL ABDOMINAL HYSTERECTOMY           Family History:  Family History   Problem Relation Age of Onset    Hypertension Mother     Melanoma Neg Hx        Social History:  Social History     Tobacco Use    Smoking status: Former    Smokeless tobacco: Never   Substance and Sexual Activity    Alcohol use: No    Drug use: No    Sexual activity: Never        Review of Systems     Review of Systems    Constitutional:  Positive for appetite change (decrease). Negative for chills and fever.   HENT:  Negative for sore throat.    Respiratory:  Negative for shortness of breath.    Cardiovascular:  Negative for chest pain.   Gastrointestinal:  Negative for abdominal pain, nausea and vomiting.   Genitourinary:  Negative for dysuria.   Musculoskeletal:  Positive for back pain.        (+) left great toe pain  (+) left knee pain  (+) left hip pain     Skin:  Negative for rash.   Neurological:  Negative for weakness.   Hematological:  Does not bruise/bleed easily.   All other systems reviewed and are negative.     Physical Exam     Initial Vitals [12/16/22 1357]   BP Pulse Resp Temp SpO2   (S) (!) 211/124 70 18 99 °F (37.2 °C) 97 %      MAP       --          Physical Exam  Nursing Notes and Vital Signs Reviewed.  Constitutional: Patient is in no acute distress. Well-developed and well-nourished.  Head: Atraumatic. Normocephalic.  Eyes: PERRL. EOM intact. Conjunctivae are not pale. No scleral icterus.  ENT: Mucous membranes are moist. Oropharynx is clear and symmetric.    Neck: Supple. Full ROM. No lymphadenopathy.  Cardiovascular: Regular rate. Regular rhythm. No murmurs, rubs, or gallops. Distal pulses are 2+ and symmetric.  Pulmonary/Chest: No respiratory distress. Clear to auscultation bilaterally. No wheezing or rales.  Abdominal: Soft and non-distended.  There is no tenderness.  No rebound, guarding, or rigidity. Good bowel sounds.  Genitourinary: No CVA tenderness  Musculoskeletal: Moves all extremities. No obvious deformities. No edema. No calf tenderness. No midline spine tenderness. No knee tenderness. No joint edema.  Tenderness to medial left great toe.   Skin: Warm and dry.  Neurological:  Alert, awake, and appropriate.  Normal speech.  No acute focal neurological deficits are appreciated.  Psychiatric: Normal affect. Good eye contact. Appropriate in content.     ED Course   Critical Care    Date/Time:  12/16/2022 6:44 PM  Performed by: Pedro Jaimes MD  Authorized by: Pedro Jaimes MD   Direct patient critical care time: 15 minutes  Additional history critical care time: 10 minutes  Ordering / reviewing critical care time: 5 minutes  Documentation critical care time: 4 minutes  Consulting other physicians critical care time: 3 minutes  Consult with family critical care time: 5 minutes  Total critical care time (exclusive of procedural time) : 42 minutes  Critical care time was exclusive of separately billable procedures and treating other patients and teaching time.  Critical care was necessary to treat or prevent imminent or life-threatening deterioration of the following conditions: HTN.  Critical care was time spent personally by me on the following activities: blood draw for specimens, development of treatment plan with patient or surrogate, discussions with consultants, interpretation of cardiac output measurements, evaluation of patient's response to treatment, examination of patient, obtaining history from patient or surrogate, ordering and performing treatments and interventions, ordering and review of laboratory studies, ordering and review of radiographic studies, pulse oximetry, re-evaluation of patient's condition and review of old charts.      ED Vital Signs:  Vitals:    12/16/22 1357 12/16/22 1437 12/16/22 1545 12/16/22 1546   BP: (S) (!) 211/124  (!) 199/93 (!) 199/93   Pulse: 70  62 60   Resp: 18  16 19   Temp: 99 °F (37.2 °C)      TempSrc: Oral      SpO2: 97%  100% 99%   Weight:  88.9 kg (196 lb)      12/16/22 1630 12/16/22 1643 12/16/22 1700 12/16/22 1715   BP:  (!) 206/92 (!) 183/87 (!) 169/86   Pulse:  66 (!) 56 62   Resp: 20 18 19 20   Temp:  98.8 °F (37.1 °C)     TempSrc:       SpO2:  99% 100% 100%   Weight:        12/16/22 1745   BP: (!) 171/84   Pulse: 66   Resp: 19   Temp:    TempSrc:    SpO2: 99%   Weight:        Abnormal Lab Results:  Labs Reviewed   COMPREHENSIVE METABOLIC PANEL -  Abnormal; Notable for the following components:       Result Value    CO2 22 (*)     Albumin 3.2 (*)     eGFR 54 (*)     All other components within normal limits   B-TYPE NATRIURETIC PEPTIDE - Abnormal; Notable for the following components:     (*)     All other components within normal limits   TROPONIN I - Abnormal; Notable for the following components:    Troponin I 0.038 (*)     All other components within normal limits   TROPONIN I - Abnormal; Notable for the following components:    Troponin I 0.061 (*)     All other components within normal limits   CBC W/ AUTO DIFFERENTIAL        All Lab Results:  Results for orders placed or performed during the hospital encounter of 12/16/22   CBC auto differential   Result Value Ref Range    WBC 6.07 3.90 - 12.70 K/uL    RBC 4.47 4.00 - 5.40 M/uL    Hemoglobin 12.1 12.0 - 16.0 g/dL    Hematocrit 37.4 37.0 - 48.5 %    MCV 84 82 - 98 fL    MCH 27.1 27.0 - 31.0 pg    MCHC 32.4 32.0 - 36.0 g/dL    RDW 14.5 11.5 - 14.5 %    Platelets 336 150 - 450 K/uL    MPV 10.1 9.2 - 12.9 fL    Immature Granulocytes 0.2 0.0 - 0.5 %    Gran # (ANC) 3.6 1.8 - 7.7 K/uL    Immature Grans (Abs) 0.01 0.00 - 0.04 K/uL    Lymph # 2.0 1.0 - 4.8 K/uL    Mono # 0.4 0.3 - 1.0 K/uL    Eos # 0.0 0.0 - 0.5 K/uL    Baso # 0.02 0.00 - 0.20 K/uL    nRBC 0 0 /100 WBC    Gran % 59.3 38.0 - 73.0 %    Lymph % 32.6 18.0 - 48.0 %    Mono % 6.9 4.0 - 15.0 %    Eosinophil % 0.7 0.0 - 8.0 %    Basophil % 0.3 0.0 - 1.9 %    Differential Method Automated    Comprehensive metabolic panel   Result Value Ref Range    Sodium 144 136 - 145 mmol/L    Potassium 3.7 3.5 - 5.1 mmol/L    Chloride 110 95 - 110 mmol/L    CO2 22 (L) 23 - 29 mmol/L    Glucose 100 70 - 110 mg/dL    BUN 18 8 - 23 mg/dL    Creatinine 1.0 0.5 - 1.4 mg/dL    Calcium 9.3 8.7 - 10.5 mg/dL    Total Protein 7.1 6.0 - 8.4 g/dL    Albumin 3.2 (L) 3.5 - 5.2 g/dL    Total Bilirubin 0.3 0.1 - 1.0 mg/dL    Alkaline Phosphatase 63 55 - 135 U/L    AST 20 10  - 40 U/L    ALT 16 10 - 44 U/L    Anion Gap 12 8 - 16 mmol/L    eGFR 54 (A) >60 mL/min/1.73 m^2   Brain natriuretic peptide   Result Value Ref Range     (H) 0 - 99 pg/mL   Troponin I   Result Value Ref Range    Troponin I 0.038 (H) 0.000 - 0.026 ng/mL   Troponin I   Result Value Ref Range    Troponin I 0.061 (H) 0.000 - 0.026 ng/mL         Imaging Results:  Imaging Results              X-Ray Chest AP Portable (Final result)  Result time 12/16/22 15:17:56      Final result by Lawrence Kruger MD (12/16/22 15:17:56)                   Impression:      1.  Basilar reticular interstitial changes.  Chronic underlying interstitial lung disease versus interstitial infectious process versus mild interstitial pulmonary edema could have this appearance.    2.  Incidental findings as noted above.      Electronically signed by: Lawrence Kruger MD  Date:    12/16/2022  Time:    15:17               Narrative:    EXAMINATION:  XR CHEST AP PORTABLE    CLINICAL HISTORY:  Hypertension;    COMPARISON:  No comparison studies are available.    FINDINGS:  The study is slightly rotated to the left.  Basilar reticular interstitial changes.  The lungs are otherwise clear.  The cardiac silhouette size is on the upper limits of normal.  The trachea is midline and the mediastinal width is normal. Negative for focal infiltrate, effusion or pneumothorax. Pulmonary vasculature is normal. Negative for osseous abnormalities. Tortuous aorta with calcifications of the aortic knob.  There are degenerative changes of the spine and both shoulder girdles.  Convex-right curvature of the lower thoracic spine.    Prominence of the left 1st costochondral cartilage noted.                                       The EKG was ordered, reviewed, and independently interpreted by the ED provider.  Interpretation time: 14:56  Rate: 61 BPM  Rhythm: normal sinus rhythm  Interpretation: No STEMI.           The Emergency Provider reviewed the vital signs and test  results, which are outlined above.     ED Discussion     3:30 PM: Pt reports she did not take Losartan this morning as prescribed.     6:42 PM: Discussed case with   Lauren Cortez NP ([facility]). Dr. Blanchard agrees with current care and management of pt and accepts admission.   Admitting Service: Hospital Medicine  Admitting Physician: Dr. Blanchard  Admit to: Observation telemetry    6:43 PM: Re-evaluated pt. I have discussed test results, shared treatment plan, and the need for admission with patient and family at bedside. Pt and family express understanding at this time and agree with all information. All questions answered. Pt and family have no further questions or concerns at this time. Pt is ready for admit.        ED Course as of 12/16/22 1845   Fri Dec 16, 2022   1707 Patient took her home HCTZ and Losartan in the ED [BA]      ED Course User Index  [BA] Pedro Jaimes MD     Medical Decision Making:   Clinical Tests:   Lab Tests: Ordered and Reviewed  Radiological Study: Ordered and Reviewed  Medical Tests: Ordered and Reviewed         ED Medication(s):  Medications   hydroCHLOROthiazide tablet 12.5 mg (12.5 mg Oral Not Given 12/16/22 1545)   losartan tablet 100 mg (100 mg Oral Not Given 12/16/22 1545)   morphine injection 4 mg (4 mg Intravenous Given 12/16/22 1546)   furosemide injection 40 mg (40 mg Intravenous Given 12/16/22 1546)       New Prescriptions    No medications on file        Follow-up Information       Marika Bean DPM. Schedule an appointment as soon as possible for a visit in 2 days.    Specialty: Podiatry  Why: For re-evaluation and further treatment  Contact information:  [address]  [place] [address]  [phone]                                 Scribe Attestation:   Scribe #1: I performed the above scribed service and the documentation accurately describes the services I performed. I attest to the accuracy of the note.     Attending:   Physician Attestation  Statement for Scribe #1: I, Pedro Jaimes MD, personally performed the services described in this documentation, as scribed by Marisa Ordonez, in my presence, and it is both accurate and complete.           Clinical Impression       ICD-10-CM ICD-9-CM   1. Pain around toenail  M79.676 729.5   2. Hypertension  I10 401.9   3. Chronic pain of left knee  M25.562 719.46    G89.29 338.29   4. Low back pain radiating to left lower extremity  M54.50 724.2    M79.605    5. Hypertensive emergency  I16.1 401.9       Disposition:   Disposition: Placed in Observation  Condition: Fair       Pedro Jaimes MD  12/16/22 2970

## 2022-12-16 NOTE — FIRST PROVIDER EVALUATION
Medical screening examination initiated.  I have conducted a focused provider triage encounter, findings are as follows:    Brief history of present illness:  Patient presents with pain to the left leg.  Reports pain is worse in the left knee.  Onset was approximately 3-4 weeks ago.    Vitals:    12/16/22 1357   BP: (S) (!) 211/124  Comment: pt did not take BP med today   BP Location: Left arm   Patient Position: Sitting   Pulse: 70   Resp: 18   Temp: 99 °F (37.2 °C)   TempSrc: Oral   SpO2: 97%       Pertinent physical exam:  Patient is hypertensive and has not taken her blood pressure medicine today.    Brief workup plan:      Preliminary workup initiated; this workup will be continued and followed by the physician or advanced practice provider that is assigned to the patient when roomed.

## 2022-12-16 NOTE — Clinical Note
Diagnosis: Hypertensive emergency [511630]   Future Attending Provider: ELIZABETH CHERY [[id number]]   Admitting Provider:: ELIZABETH CHERY. [[id number]]

## 2022-12-17 VITALS
SYSTOLIC BLOOD PRESSURE: 168 MMHG | TEMPERATURE: 98 F | WEIGHT: 188 LBS | HEIGHT: 67 IN | BODY MASS INDEX: 29.51 KG/M2 | HEART RATE: 80 BPM | DIASTOLIC BLOOD PRESSURE: 79 MMHG | OXYGEN SATURATION: 97 % | RESPIRATION RATE: 20 BRPM

## 2022-12-17 PROBLEM — R79.89 ELEVATED BRAIN NATRIURETIC PEPTIDE (BNP) LEVEL: Status: ACTIVE | Noted: 2022-12-17

## 2022-12-17 PROBLEM — R79.89 ELEVATED TROPONIN: Status: ACTIVE | Noted: 2022-12-17

## 2022-12-17 PROBLEM — I16.0 HYPERTENSIVE URGENCY: Status: ACTIVE | Noted: 2022-12-17

## 2022-12-17 LAB
AORTIC ROOT ANNULUS: 3.38 CM
ASCENDING AORTA: 3.1 CM
AV INDEX (PROSTH): 0.83
AV MEAN GRADIENT: 4 MMHG
AV PEAK GRADIENT: 8 MMHG
AV VALVE AREA: 2.69 CM2
AV VELOCITY RATIO: 0.82
BSA FOR ECHO PROCEDURE: 2.01 M2
CV ECHO LV RWT: 0.8 CM
DOP CALC AO PEAK VEL: 1.37 M/S
DOP CALC AO VTI: 33.5 CM
DOP CALC LVOT AREA: 3.2 CM2
DOP CALC LVOT DIAMETER: 2.03 CM
DOP CALC LVOT PEAK VEL: 1.13 M/S
DOP CALC LVOT STROKE VOLUME: 90.25 CM3
DOP CALC RVOT PEAK VEL: 0.87 M/S
DOP CALC RVOT VTI: 16.4 CM
DOP CALCLVOT PEAK VEL VTI: 27.9 CM
E WAVE DECELERATION TIME: 225.49 MSEC
E/A RATIO: 0.67
E/E' RATIO: 13.8 M/S
ECHO LV POSTERIOR WALL: 1.42 CM (ref 0.6–1.1)
EJECTION FRACTION: 60 %
FRACTIONAL SHORTENING: 29 % (ref 28–44)
INTERVENTRICULAR SEPTUM: 1.79 CM (ref 0.6–1.1)
IVC DIAMETER: 1.31 CM
IVRT: 91.34 MSEC
LA MAJOR: 4.59 CM
LA MINOR: 5.25 CM
LA WIDTH: 3.9 CM
LEFT ATRIUM SIZE: 3.21 CM
LEFT ATRIUM VOLUME INDEX: 26.5 ML/M2
LEFT ATRIUM VOLUME: 52.12 CM3
LEFT INTERNAL DIMENSION IN SYSTOLE: 2.53 CM (ref 2.1–4)
LEFT VENTRICLE DIASTOLIC VOLUME INDEX: 26.61 ML/M2
LEFT VENTRICLE DIASTOLIC VOLUME: 52.42 ML
LEFT VENTRICLE MASS INDEX: 111 G/M2
LEFT VENTRICLE SYSTOLIC VOLUME INDEX: 11.7 ML/M2
LEFT VENTRICLE SYSTOLIC VOLUME: 23.08 ML
LEFT VENTRICULAR INTERNAL DIMENSION IN DIASTOLE: 3.54 CM (ref 3.5–6)
LEFT VENTRICULAR MASS: 219.57 G
LV LATERAL E/E' RATIO: 11.5 M/S
LV SEPTAL E/E' RATIO: 17.25 M/S
LVOT MG: 3.48 MMHG
LVOT MV: 0.92 CM/S
MV PEAK A VEL: 1.03 M/S
MV PEAK E VEL: 0.69 M/S
PISA MRMAX VEL: 6.44 M/S
PISA TR MAX VEL: 2.83 M/S
POCT GLUCOSE: 96 MG/DL (ref 70–110)
PV MEAN GRADIENT: 2 MMHG
RA MAJOR: 3.77 CM
RA PRESSURE: 3 MMHG
RA WIDTH: 3.41 CM
SINUS: 3.48 CM
STJ: 3.35 CM
TDI LATERAL: 0.06 M/S
TDI SEPTAL: 0.04 M/S
TDI: 0.05 M/S
TR MAX PG: 32 MMHG
TR MEAN GRADIENT: 20 MMHG
TRICUSPID ANNULAR PLANE SYSTOLIC EXCURSION: 1.95 CM
TROPONIN I SERPL DL<=0.01 NG/ML-MCNC: 0.05 NG/ML (ref 0–0.03)
TV REST PULMONARY ARTERY PRESSURE: 35 MMHG

## 2022-12-17 PROCEDURE — 84484 ASSAY OF TROPONIN QUANT: CPT | Performed by: NURSE PRACTITIONER

## 2022-12-17 PROCEDURE — 99220 PR INITIAL OBSERVATION CARE,LEVL III: CPT | Mod: ,,, | Performed by: INTERNAL MEDICINE

## 2022-12-17 PROCEDURE — 99220 PR INITIAL OBSERVATION CARE,LEVL III: ICD-10-PCS | Mod: ,,, | Performed by: INTERNAL MEDICINE

## 2022-12-17 PROCEDURE — 36415 COLL VENOUS BLD VENIPUNCTURE: CPT | Performed by: NURSE PRACTITIONER

## 2022-12-17 PROCEDURE — 96376 TX/PRO/DX INJ SAME DRUG ADON: CPT

## 2022-12-17 PROCEDURE — 25000003 PHARM REV CODE 250: Performed by: NURSE PRACTITIONER

## 2022-12-17 PROCEDURE — 63600175 PHARM REV CODE 636 W HCPCS: Performed by: NURSE PRACTITIONER

## 2022-12-17 PROCEDURE — G0378 HOSPITAL OBSERVATION PER HR: HCPCS

## 2022-12-17 PROCEDURE — 96375 TX/PRO/DX INJ NEW DRUG ADDON: CPT | Mod: 59

## 2022-12-17 PROCEDURE — 25000003 PHARM REV CODE 250: Performed by: HOSPITALIST

## 2022-12-17 RX ORDER — HYDRALAZINE HYDROCHLORIDE 20 MG/ML
10 INJECTION INTRAMUSCULAR; INTRAVENOUS EVERY 8 HOURS PRN
Status: DISCONTINUED | OUTPATIENT
Start: 2022-12-17 | End: 2022-12-17 | Stop reason: HOSPADM

## 2022-12-17 RX ORDER — FUROSEMIDE 10 MG/ML
20 INJECTION INTRAMUSCULAR; INTRAVENOUS ONCE
Status: COMPLETED | OUTPATIENT
Start: 2022-12-17 | End: 2022-12-17

## 2022-12-17 RX ADMIN — HYDROCHLOROTHIAZIDE 12.5 MG: 12.5 TABLET ORAL at 08:12

## 2022-12-17 RX ADMIN — HYDRALAZINE HYDROCHLORIDE 10 MG: 20 INJECTION, SOLUTION INTRAMUSCULAR; INTRAVENOUS at 01:12

## 2022-12-17 RX ADMIN — FUROSEMIDE 20 MG: 10 INJECTION, SOLUTION INTRAMUSCULAR; INTRAVENOUS at 01:12

## 2022-12-17 RX ADMIN — LOSARTAN POTASSIUM 100 MG: 50 TABLET, FILM COATED ORAL at 08:12

## 2022-12-17 RX ADMIN — LATANOPROST 1 DROP: 50 SOLUTION OPHTHALMIC at 12:12

## 2022-12-17 RX ADMIN — DORZOLAMIDE HYDROCHLORIDE AND TIMOLOL MALEATE 1 DROP: 22.3; 6.8 SOLUTION/ DROPS OPHTHALMIC at 12:12

## 2022-12-17 RX ADMIN — ALLOPURINOL 300 MG: 300 TABLET ORAL at 08:12

## 2022-12-17 RX ADMIN — DORZOLAMIDE HYDROCHLORIDE AND TIMOLOL MALEATE 1 DROP: 22.3; 6.8 SOLUTION/ DROPS OPHTHALMIC at 08:12

## 2022-12-17 RX ADMIN — CHOLECALCIFEROL TAB 125 MCG (5000 UNIT) 5000 UNITS: 125 TAB at 08:12

## 2022-12-17 NOTE — ASSESSMENT & PLAN NOTE
Chronic.  Stable.  Voices new complaints when vision.  Compliant with home medication.  Plan:  -continue eyedrops (Cosopt and latanoprost)

## 2022-12-17 NOTE — ASSESSMENT & PLAN NOTE
Body mass index is 32.24 kg/m². Elevation likely secondary to increased calorie intake and sedentary lifestyle. Patient educated on morbidity and mortality in regards to elevated BMI and stressed importance of diet and exercise.   Plan:  -low fat/low calorie diet        Irregular Contractions

## 2022-12-17 NOTE — ASSESSMENT & PLAN NOTE
Elevated BNP of 701.  Chest x-ray reveals infection versus pulmonary edema.  No history of CHF.  Denies any cardiopulmonary complaints.  Not established with Cardiology.  Nonpitting edema noted to bilateral lower extremities.  Decreased lung sounds noted without any audible wheezes, rhonchi, or rales noted.  40 mg IV Lasix given in ED.  Plan:  -tele monitoring  -echocardiogram  -cards consult in am  -consider additional lasix as needed

## 2022-12-17 NOTE — CONSULTS
[facility] ([facility])  Cardiology  Consult Note    Patient Name: Henna Jorge  MRN: [id number]  Admission Date: 12/16/2022  Hospital Length of Stay: 0 days  Code Status: Full Code   Attending Provider: Yonathan Jain, *   Consulting Provider: Rory Merlos MD  Primary Care Physician: Mary Corbett MD  Principal Problem:Hypertensive urgency    Patient information was obtained from patient and ER records.     Inpatient consult to Cardiology  Consult performed by: Hi Merlos MD  Consult ordered by: Favio Blanchard NP        Subjective:     Chief Complaint:  Hypertensive emergency     HPI:     From [facility] medicine  Henna Jorge is a 89 y.o. female with a PMH  has a past medical history of Arthritis, Back pain, Cancer, Disorder of kidney and ureter, Glaucoma, Gout, chronic, Hypertension, Osteopenia, and Osteoporosis.  Presented to the ER this evening for evaluation of persistent pain x1 month in her feet, legs, hip, and low back.  Patient states that her foot and leg pain is typical for gout pain, but states that her hip and her back pain is due to because she has been immobile for the last week or so secondary staying in his seated position most of the day.  Denies any injury or trauma which may have caused the onset of worsening of her symptoms.  However, upon triage was noted that patient was hypertensive with a BP reading of 211/124.  Patient denied any associated symptoms such as headache, lightheadedness, visual disturbances, chest pain, palpitations, shortness of breath, dyspnea exertion, generalized weakness, or any other symptoms at this time.  Patient reports compliance with home medications.  Denies any recent illnesses or sick contacts.  Denies any pain at this time.  Patient states she is not established with a cardiologist.  Hospital Medicine consulted to admit patient or elevated blood pressure readings, elevated troponin, elevated BNP.  Patient and family member at bedside in  agreement with treatment plan.  Patient will be admitted in the observation setting.    Cards consult obtained for elevated troponin and bnp. She has no cardiac symptoms. She is non compliant with salt intake and meds. No chf symptoms or leg swelling.      Past Medical History:   Diagnosis Date    Arthritis     Back pain     sometimes    Cancer     hx of cervical    Disorder of kidney and ureter     Glaucoma     Gout, chronic     Hypertension     Osteopenia     Osteoporosis        Past Surgical History:   Procedure Laterality Date    EYE SURGERY      glaucoma    TONSILLECTOMY      TOTAL ABDOMINAL HYSTERECTOMY         Review of patient's allergies indicates:  No Known Allergies    No current facility-administered medications on file prior to encounter.     Current Outpatient Medications on File Prior to Encounter   Medication Sig    alendronate (FOSAMAX) 70 MG tablet TAKE 1 TABLET BY MOUTH EVERY 7 DAYS    allopurinoL (ZYLOPRIM) 300 MG tablet Take 1 tablet (300 mg total) by mouth once daily.    cholecalciferol, vitamin D3, 125 mcg (5,000 unit) Tab Take 5,000 Units by mouth once daily.    colchicine (COLCRYS) 0.6 mg tablet Take 1 tablet (0.6 mg total) by mouth 2 (two) times daily as needed (gout attack). TAKE 1 TABLET BY MOUTH TWICE DAILY AS NEEDED FOR GOUT    dorzolamide-timolol 2-0.5% (COSOPT) 2-0.5 % ophthalmic solution Place 1 drop into both eyes 2 (two) times daily.    latanoprost 0.005 % ophthalmic solution Place 1 drop into both eyes nightly.    losartan-hydrochlorothiazide 100-12.5 mg (HYZAAR) 100-12.5 mg Tab TAKE 1 TABLET BY MOUTH EVERY DAY     Family History       Problem Relation (Age of Onset)    Hypertension Mother    No Known Problems Father          Tobacco Use    Smoking status: Former    Smokeless tobacco: Never   Substance and Sexual Activity    Alcohol use: No    Drug use: No    Sexual activity: Never     Review of Systems   Constitutional: Negative for diaphoresis,  malaise/fatigue and weight gain.   HENT:  Negative for hoarse voice.    Eyes:  Negative for double vision and visual disturbance.   Cardiovascular:  Negative for chest pain, claudication, cyanosis, dyspnea on exertion, irregular heartbeat, leg swelling, near-syncope, orthopnea, palpitations, paroxysmal nocturnal dyspnea and syncope.   Respiratory:  Negative for cough, hemoptysis, shortness of breath and snoring.    Hematologic/Lymphatic: Negative for bleeding problem. Does not bruise/bleed easily.   Skin:  Negative for color change and poor wound healing.   Musculoskeletal:  Positive for arthritis, gout and joint pain. Negative for muscle cramps, muscle weakness and myalgias.   Gastrointestinal:  Negative for bloating, abdominal pain, change in bowel habit, diarrhea, heartburn, hematemesis, hematochezia, melena and nausea.   Neurological:  Negative for excessive daytime sleepiness, dizziness, headaches, light-headedness, loss of balance, numbness and weakness.   Psychiatric/Behavioral:  Negative for memory loss. The patient does not have insomnia.    Allergic/Immunologic: Negative for hives.   Objective:     Vital Signs (Most Recent):  Temp: 98.5 °F (36.9 °C) (12/17/22 1119)  Pulse: 67 (12/17/22 1119)  Resp: 20 (12/17/22 1119)  BP: (!) 168/79 (12/17/22 1119)  SpO2: 95 % (12/17/22 1119)   Vital Signs (24h Range):  Temp:  [98.2 °F (36.8 °C)-99 °F (37.2 °C)] 98.5 °F (36.9 °C)  Pulse:  [56-83] 67  Resp:  [16-20] 20  SpO2:  [95 %-100 %] 95 %  BP: (139-211)/() 168/79     Weight: 85.3 kg (188 lb)  Body mass index is 29.44 kg/m².    SpO2: 95 %         Intake/Output Summary (Last 24 hours) at 12/17/2022 1144  Last data filed at 12/16/2022 1838  Gross per 24 hour   Intake --   Output 900 ml   Net -900 ml       Lines/Drains/Airways       Peripheral Intravenous Line  Duration                  Peripheral IV - Single Lumen 12/16/22 1436 22 G Anterior;Proximal;Right Forearm <1 day                    Physical  Exam  Constitutional:       General: She is not in acute distress.     Appearance: Normal appearance. She is well-developed. She is obese. She is not ill-appearing.   HENT:      Head: Normocephalic and atraumatic.   Eyes:      General: No scleral icterus.     Pupils: Pupils are equal, round, and reactive to light.   Neck:      Thyroid: No thyromegaly.      Vascular: Normal carotid pulses. No carotid bruit, hepatojugular reflux or JVD.      Trachea: No tracheal deviation.   Cardiovascular:      Rate and Rhythm: Normal rate and regular rhythm.      Pulses: Normal pulses.      Heart sounds: Normal heart sounds. No murmur heard.    No friction rub. No gallop.   Pulmonary:      Effort: Pulmonary effort is normal. No respiratory distress.      Breath sounds: Normal breath sounds. No wheezing, rhonchi or rales.   Chest:      Chest wall: No tenderness.   Abdominal:      General: Bowel sounds are normal. There is no abdominal bruit.      Palpations: Abdomen is soft. There is no hepatomegaly or pulsatile mass.      Tenderness: There is no abdominal tenderness.   Musculoskeletal:      Right shoulder: No deformity.      Cervical back: Normal range of motion and neck supple.      Right lower leg: No edema.      Left lower leg: No edema.   Skin:     General: Skin is warm and dry.      Findings: No erythema or rash.      Nails: There is no clubbing.   Neurological:      Mental Status: She is alert and oriented to person, place, and time.      Cranial Nerves: No cranial nerve deficit.      Coordination: Coordination normal.   Psychiatric:         Speech: Speech normal.         Behavior: Behavior normal.         Judgment: Judgment normal.       Significant Labs:   Recent Lab Results  (Last 5 results in the past 24 hours)        12/17/22  0544   12/17/22  0228   12/16/22  2224   12/16/22  1750   12/16/22  1428        Albumin         3.2       Alkaline Phosphatase         63       ALT         16       ANION GAP         12       AST          20       Baso #         0.02       Basophil %         0.3       BILIRUBIN TOTAL         0.3  Comment: For infants and newborns, interpretation of results should be based  on gestational age, weight and in agreement with clinical  observations.    Premature Infant recommended reference ranges:  Up to 24 hours.............<8.0 mg/dL  Up to 48 hours............<12.0 mg/dL  3-5 days..................<15.0 mg/dL  6-29 days.................<15.0 mg/dL         BNP         701  Comment: Values of less than 100 pg/ml are consistent with non-CHF populations.       BUN         18       Calcium         9.3       Chloride         110       CO2         22       Creatinine         1.0       Differential Method         Automated       eGFR         54       Eos #         0.0       Eosinophil %         0.7       Glucose         100       Gran # (ANC)         3.6       Gran %         59.3       HEMATOCRIT         37.4       HEMOGLOBIN         12.1       Immature Grans (Abs)         0.01  Comment: Mild elevation in immature granulocytes is non specific and   can be seen in a variety of conditions including stress response,   acute inflammation, trauma and pregnancy. Correlation with other   laboratory and clinical findings is essential.         Immature Granulocytes         0.2       Lymph #         2.0       Lymph %         32.6       MCH         27.1       MCHC         32.4       MCV         84       Mono #         0.4       Mono %         6.9       MPV         10.1       nRBC         0       Platelets         336       POCT Glucose 96               Potassium         3.7       PROTEIN TOTAL         7.1       RBC         4.47       RDW         14.5       Sodium         144       Troponin I   0.054  Comment: The reference interval for Troponin I represents the 99th percentile   cutoff   for our facility and is consistent with 3rd generation assay   performance.     0.074  Comment: The reference interval for Troponin I represents  the 99th percentile   cutoff   for our facility and is consistent with 3rd generation assay   performance.     0.061  Comment: The reference interval for Troponin I represents the 99th percentile   cutoff   for our facility and is consistent with 3rd generation assay   performance.     0.038  Comment: The reference interval for Troponin I represents the 99th percentile   cutoff   for our facility and is consistent with 3rd generation assay   performance.         WBC         6.07                              Significant Imaging:     Assessment and Plan:     * Hypertensive urgency  Secondary to non compliance with salt intake and meds. Counseled about taking meds regularily .discussed low salt diet in detail.will obtain echo    Elevated troponin  Secondary to demand ischemia will add b blcokers asa ec 81 mg po daily check lipids. Obtain echo and cardiolite as outpatient.    Elevated brain natriuretic peptide (BNP) level  secondary to hypertesinve emergency. Will diurese resume home emds.     Gout, chronic  Per hospital team.        VTE Risk Mitigation (From admission, onward)         Ordered     IP VTE HIGH RISK PATIENT  Once         12/16/22 2210     Place sequential compression device  Until discontinued         12/16/22 2210                Thank you for your consult. I will follow-up with patient. Please contact us if you have any additional questions.    Rory Merlos MD  Cardiology   [facility] ([facility])

## 2022-12-17 NOTE — SUBJECTIVE & OBJECTIVE
Past Medical History:   Diagnosis Date    Arthritis     Back pain     sometimes    Cancer     hx of cervical    Disorder of kidney and ureter     Glaucoma     Gout, chronic     Hypertension     Osteopenia     Osteoporosis        Past Surgical History:   Procedure Laterality Date    EYE SURGERY      glaucoma    TONSILLECTOMY      TOTAL ABDOMINAL HYSTERECTOMY         Review of patient's allergies indicates:  No Known Allergies    No current facility-administered medications on file prior to encounter.     Current Outpatient Medications on File Prior to Encounter   Medication Sig    alendronate (FOSAMAX) 70 MG tablet TAKE 1 TABLET BY MOUTH EVERY 7 DAYS    allopurinoL (ZYLOPRIM) 300 MG tablet Take 1 tablet (300 mg total) by mouth once daily.    cholecalciferol, vitamin D3, 125 mcg (5,000 unit) Tab Take 5,000 Units by mouth once daily.    colchicine (COLCRYS) 0.6 mg tablet Take 1 tablet (0.6 mg total) by mouth 2 (two) times daily as needed (gout attack). TAKE 1 TABLET BY MOUTH TWICE DAILY AS NEEDED FOR GOUT    dorzolamide-timolol 2-0.5% (COSOPT) 2-0.5 % ophthalmic solution Place 1 drop into both eyes 2 (two) times daily.    latanoprost 0.005 % ophthalmic solution Place 1 drop into both eyes nightly.    losartan-hydrochlorothiazide 100-12.5 mg (HYZAAR) 100-12.5 mg Tab TAKE 1 TABLET BY MOUTH EVERY DAY     Family History       Problem Relation (Age of Onset)    Hypertension Mother    No Known Problems Father          Tobacco Use    Smoking status: Former    Smokeless tobacco: Never   Substance and Sexual Activity    Alcohol use: No    Drug use: No    Sexual activity: Never     Review of Systems   Respiratory:  Negative for cough, chest tightness, shortness of breath and wheezing.    Cardiovascular:  Positive for leg swelling. Negative for chest pain and palpitations.   Musculoskeletal:  Positive for arthralgias, back pain and myalgias.   All other systems reviewed and are negative.  Objective:     Vital Signs (Most  Recent):  Temp: 98.3 °F (36.8 °C) (12/16/22 2350)  Pulse: 74 (12/16/22 2350)  Resp: 17 (12/16/22 2350)  BP: (!) 180/96 (12/16/22 2350)  SpO2: 96 % (12/16/22 2350)   Vital Signs (24h Range):  Temp:  [98.3 °F (36.8 °C)-99 °F (37.2 °C)] 98.3 °F (36.8 °C)  Pulse:  [56-74] 74  Resp:  [16-20] 17  SpO2:  [96 %-100 %] 96 %  BP: (156-211)/() 180/96     Weight: 85.2 kg (187 lb 13.3 oz)  Body mass index is 32.24 kg/m².    Physical Exam  Vitals and nursing note reviewed.   Constitutional:       General: She is awake. She is not in acute distress.     Appearance: Normal appearance. She is well-developed and well-groomed. She is not ill-appearing, toxic-appearing or diaphoretic.   HENT:      Head: Normocephalic and atraumatic.      Mouth/Throat:      Lips: Pink.      Mouth: Mucous membranes are moist.      Pharynx: Oropharynx is clear. Uvula midline.   Eyes:      Extraocular Movements: Extraocular movements intact.      Conjunctiva/sclera: Conjunctivae normal.      Pupils: Pupils are equal, round, and reactive to light.   Cardiovascular:      Rate and Rhythm: Normal rate and regular rhythm.      Pulses:           Radial pulses are 2+ on the right side and 2+ on the left side.        Dorsalis pedis pulses are 2+ on the right side and 2+ on the left side.      Heart sounds: Normal heart sounds. No murmur heard.     Comments: Mild non-pitting edema to bilateral lower extremities  Pulmonary:      Effort: Pulmonary effort is normal.      Breath sounds: Normal breath sounds.   Abdominal:      General: Bowel sounds are normal.      Palpations: Abdomen is soft.      Tenderness: There is no abdominal tenderness.   Musculoskeletal:      Cervical back: Normal range of motion and neck supple.      Comments: 5/5 strength throughout   Skin:     General: Skin is warm and dry.      Capillary Refill: Capillary refill takes less than 2 seconds.   Neurological:      Mental Status: She is alert and oriented to person, place, and time. Mental  status is at baseline.      GCS: GCS eye subscore is 4. GCS verbal subscore is 5. GCS motor subscore is 6.      Cranial Nerves: Cranial nerves 2-12 are intact.      Sensory: Sensation is intact.      Motor: Motor function is intact.      Deep Tendon Reflexes: Reflexes are normal and symmetric.   Psychiatric:         Mood and Affect: Mood normal.         Speech: Speech normal.         Behavior: Behavior normal. Behavior is cooperative.         CRANIAL NERVES     CN III, IV, VI   Pupils are equal, round, and reactive to light.     LABS:  Recent Results (from the past 24 hour(s))   CBC auto differential    Collection Time: 12/16/22  2:28 PM   Result Value Ref Range    WBC 6.07 3.90 - 12.70 K/uL    RBC 4.47 4.00 - 5.40 M/uL    Hemoglobin 12.1 12.0 - 16.0 g/dL    Hematocrit 37.4 37.0 - 48.5 %    MCV 84 82 - 98 fL    MCH 27.1 27.0 - 31.0 pg    MCHC 32.4 32.0 - 36.0 g/dL    RDW 14.5 11.5 - 14.5 %    Platelets 336 150 - 450 K/uL    MPV 10.1 9.2 - 12.9 fL    Immature Granulocytes 0.2 0.0 - 0.5 %    Gran # (ANC) 3.6 1.8 - 7.7 K/uL    Immature Grans (Abs) 0.01 0.00 - 0.04 K/uL    Lymph # 2.0 1.0 - 4.8 K/uL    Mono # 0.4 0.3 - 1.0 K/uL    Eos # 0.0 0.0 - 0.5 K/uL    Baso # 0.02 0.00 - 0.20 K/uL    nRBC 0 0 /100 WBC    Gran % 59.3 38.0 - 73.0 %    Lymph % 32.6 18.0 - 48.0 %    Mono % 6.9 4.0 - 15.0 %    Eosinophil % 0.7 0.0 - 8.0 %    Basophil % 0.3 0.0 - 1.9 %    Differential Method Automated    Comprehensive metabolic panel    Collection Time: 12/16/22  2:28 PM   Result Value Ref Range    Sodium 144 136 - 145 mmol/L    Potassium 3.7 3.5 - 5.1 mmol/L    Chloride 110 95 - 110 mmol/L    CO2 22 (L) 23 - 29 mmol/L    Glucose 100 70 - 110 mg/dL    BUN 18 8 - 23 mg/dL    Creatinine 1.0 0.5 - 1.4 mg/dL    Calcium 9.3 8.7 - 10.5 mg/dL    Total Protein 7.1 6.0 - 8.4 g/dL    Albumin 3.2 (L) 3.5 - 5.2 g/dL    Total Bilirubin 0.3 0.1 - 1.0 mg/dL    Alkaline Phosphatase 63 55 - 135 U/L    AST 20 10 - 40 U/L    ALT 16 10 - 44 U/L    Anion  Gap 12 8 - 16 mmol/L    eGFR 54 (A) >60 mL/min/1.73 m^2   Brain natriuretic peptide    Collection Time: 12/16/22  2:28 PM   Result Value Ref Range     (H) 0 - 99 pg/mL   Troponin I    Collection Time: 12/16/22  2:28 PM   Result Value Ref Range    Troponin I 0.038 (H) 0.000 - 0.026 ng/mL   Troponin I    Collection Time: 12/16/22  5:50 PM   Result Value Ref Range    Troponin I 0.061 (H) 0.000 - 0.026 ng/mL   Troponin I    Collection Time: 12/16/22 10:24 PM   Result Value Ref Range    Troponin I 0.074 (H) 0.000 - 0.026 ng/mL       RADIOLOGY  X-Ray Chest AP Portable    Result Date: 12/16/2022  EXAMINATION: XR CHEST AP PORTABLE CLINICAL HISTORY: Hypertension; COMPARISON: No comparison studies are available. FINDINGS: The study is slightly rotated to the left.  Basilar reticular interstitial changes.  The lungs are otherwise clear.  The cardiac silhouette size is on the upper limits of normal.  The trachea is midline and the mediastinal width is normal. Negative for focal infiltrate, effusion or pneumothorax. Pulmonary vasculature is normal. Negative for osseous abnormalities. Tortuous aorta with calcifications of the aortic knob.  There are degenerative changes of the spine and both shoulder girdles.  Convex-right curvature of the lower thoracic spine. Prominence of the left 1st costochondral cartilage noted.     1.  Basilar reticular interstitial changes.  Chronic underlying interstitial lung disease versus interstitial infectious process versus mild interstitial pulmonary edema could have this appearance. 2.  Incidental findings as noted above. Electronically signed by: Lawrence Kruger MD Date:    12/16/2022 Time:    15:17      EKG    MICROBIOLOGY    MDM     Amount and/or Complexity of Data Reviewed  Clinical lab tests: reviewed  Tests in the radiology section of CPT®: reviewed  Tests in the medicine section of CPT®: reviewed  Discussion of test results with the performing providers: yes  Decide to obtain previous  medical records or to obtain history from someone other than the patient: yes  Obtain history from someone other than the patient: yes  Review and summarize past medical records: yes  Discuss the patient with other providers: yes  Independent visualization of images, tracings, or specimens: yes

## 2022-12-17 NOTE — PHARMACY MED REC
"Admission Medication History     The home medication history was taken by Lucas Obrien.    You may go to "Admission" then "Reconcile Home Medications" tabs to review and/or act upon these items.     The home medication list has been updated by the Pharmacy department.   Please read ALL comments highlighted in yellow.   Please address this information as you see fit.    Feel free to contact us if you have any questions or require assistance.      The medications listed below were removed from the home medication list. Please reorder if appropriate:  Patient reports no longer taking the following medication(s):  ACETAMINOPHEN 650 mg TABLETS (only takes occasionally)  CETIRIZINE 10 mg TABLET  MELOXICAM 7.5 mg TABLET  TRAVATAN Z 0.004 % OPHTHALMIC DROP    Medications listed below were obtained from: Patient/family, [employer]- [employer], and Patient's pharmacy  (Not in a hospital admission)      otential issues to be addressed PRIOR TO DISCHARGE: NONE    Lucas Obrien, Jak-Adv  Pharmacy Technician Specialist-Medication History  [facility] [phone]  Secure chat preferred     Current Outpatient Medications on File Prior to Encounter   Medication Sig Dispense Refill Last Dose    alendronate (FOSAMAX) 70 MG tablet TAKE 1 TABLET BY MOUTH EVERY 7 DAYS 4 tablet 3 Past Week    allopurinoL (ZYLOPRIM) 300 MG tablet Take 1 tablet (300 mg total) by mouth once daily. 90 tablet 1 12/15/2022    cholecalciferol, vitamin D3, 125 mcg (5,000 unit) Tab Take 5,000 Units by mouth once daily.   12/15/2022    colchicine (COLCRYS) 0.6 mg tablet Take 1 tablet (0.6 mg total) by mouth 2 (two) times daily as needed (gout attack). TAKE 1 TABLET BY MOUTH TWICE DAILY AS NEEDED FOR GOUT 30 tablet 2 12/15/2022    dorzolamide-timolol 2-0.5% (COSOPT) 2-0.5 % ophthalmic solution Place 1 drop into both eyes 2 (two) times daily.   12/16/2022    latanoprost 0.005 % ophthalmic solution Place 1 drop into both eyes nightly.   12/15/2022    " losartan-hydrochlorothiazide 100-12.5 mg (HYZAAR) 100-12.5 mg Tab TAKE 1 TABLET BY MOUTH EVERY DAY 90 tablet 0 12/15/2022    [DISCONTINUED] acetaminophen (TYLENOL ARTHRITIS ORAL) Take by mouth daily as needed.       [DISCONTINUED] cetirizine (ZYRTEC) 10 MG tablet TAKE 1 TABLET(10 MG) BY MOUTH EVERY DAY 30 tablet 0     [DISCONTINUED] meloxicam (MOBIC) 7.5 MG tablet Take 1 tablet (7.5 mg total) by mouth daily as needed. 90 tablet 2     [DISCONTINUED] TRAVATAN Z 0.004 % Drop                               .

## 2022-12-17 NOTE — ASSESSMENT & PLAN NOTE
Secondary to demand ischemia will add b bettecokers asa ec 81 mg po daily check lipids. Obtain echo and cardiolite as outpatient.

## 2022-12-17 NOTE — PLAN OF CARE
[facility] (Hospital)  Discharge Final Note    Primary Care Provider: Mary Corbett MD    Expected Discharge Date: 12/17/2022    Final Discharge Note (most recent)       Final Note - 12/17/22 1303          Final Note    Assessment Type Final Discharge Note     Anticipated Discharge Disposition Home or Self Care        Post-Acute Status    Discharge Delays None known at this time                     Important Message from Medicare             Contact Info       Marika Bean DPM   Specialty: Podiatry    [address]  [place] [address]   Phone: [phone]       Next Steps: Schedule an appointment as soon as possible for a visit in 2 day(s)    Instructions: For re-evaluation and further treatment    Yola Vasquez PA-C   Specialty: Cardiology    [address] [place] [address] [address] [id number]   Phone: [phone]       Next Steps: Follow up in 1 week(s)    Instructions: -hospital follow up - evaluation for outpatient ischemic work up    Mary Corbett MD   Specialty: Family Medicine   Relationship: PCP - General    [address]  [place] [address]   Phone: [phone]       Next Steps: Follow up in 3 day(s)    Instructions: -hospital follow up and medication review          Pt has no discharge needs at the time of chart review.      Sammie Ramirez is a 42 year old who presents for colposcopy for atypical glandular cells on pap with - HPV.      She has prior history:    Hx of LEEP x 2  2/14/12 LSIL  10/3/12 NIL.  Plan pap in 1 yr.  10/2013 ASCUS neg HPV.  Plan pap in 1 yr. Per [facility] algorithms, needs two negative cotests at 12 & 24 months after LEEP before going to cotest in 3 yrs.  10/2014 NIL pap, neg HPV. Plan cotest pap & HPV in 1 year. Tracking started.  2/9/2016 Ascus Neg HPV, plan: cotest one year per MD. Tracking.   05/01/17 NIL pap, Neg HR HPV result. Plan cotest in 1 year per provider.  5/15/18 ATYP glandular/endocervical cells, Neg HPV. Plan colp and endometrial sampling per guidelines.      GYNECOLOGIC HISTORY   Contraception Use: None  Pap: ATYP/- HPV          This document serves as a record of the services and decisions personally performed and made by Krystin Sen DO. It was created on her behalf by Julia Belle, a trained medical scribe. The creation of this document is based the provider's statements to the medical scribe.  Scribe Julia Belle 1:15 PM, June 14, 2018    Risks and benefits were explained in detail prior to procedure including bleeding, infection and possible need for further treatment.  Informed consent was obtained to proceed.    Procedure/Findings:  Patient was placed in dorsal lithotomy position. Speculum was inserted. Gross examination of the cervix revealed mild acetic changes.  Acetic acid and Lugol's solution applied.    1. Colposcopy adequate:  Yes     2. Squamocolumnar junction visibility: completely visible    3. Normal colposcopic findings:      Original squamous epithelium:  mature    Other: None    4. Abnormal colposcopic findings:  yes   Location of lesion:  inside transformation zone at 2, 4 & 8 o'clock    Size of lesion:  3 quadrants,  6% of cervix    Grade 1 (minor): yes     Details: fine mosaicism, fine punctation and thin acetowhite    epithelium    Grade 2 (major): no     Details:  None    Non-specific:       Leukoplakia: no      Erosion: no    Lugol's staining: not stained    Suspicious for invasion: no     Details: None    Miscellaneous findings: None    5.  3 biopsies were obtained at 2, 4 & 8 o'clock.     ECC was performed.     Hemostasis was achieved with Monsel's.          Procedure:  - After obtaining informed consent pt prepped in the usual sterile fashion and endometrial biopsy preformed w a pipelle type sampler without difficulty or complication w a thorough sampling and acceptable specimen.  The uterus sounded to 8 cm.  the pt tolerated the procedure well and was D/C'd in good condition. Signs and Sx's of complications disc, pt to call.  pt will call in 1 week to rev path report and develope an approp plan.          Procedure course:   Patient tolerated procedure well  Assessment:   Normal exam without visible pathology, ROBERT 1, ROBERT 2 and ROBERT 3  Plan:     1. - if no dysplasia, repeat pap in 6/12 months       - if CIN1, recommend repeat pap in 6/12 months or HPV in 1 year       - if CIN2-3 recommend LEEP, Cryo or Repeat pap/colposcopy in 6/12 months  2. Atypical glandular/endocervical cells - HIGH RISK: Endo pathology pending & US ordered; Explained this finding on pap smear and reason for endo bx/US, to rule out any uterine pathology   3. Pitkin & EMB results will be reported within 1 week, advised to abstain from sexual intercourse during this time to allow healing  4. Return to clinic prn.           The information in this document, created by the medical scribe for me, accurately reflects the services I personally performed and the decisions made by me. I have reviewed and approved this document for accuracy prior to leaving the patient care area.  1:15 PM, 06/14/18    Dr. Krystin Sen, DO    OB/GYN   [facility]      2011 Colposcopic Terminology of the [employer] for Cervical Pathology and Colposcopy  Obstetrics and Gynecology,  VOL. 120, NO. 1, JULY 2012

## 2022-12-17 NOTE — ASSESSMENT & PLAN NOTE
Initial troponin 0.038 with delta troponin being 0.061.  EKG revealed normal sinus rhythm with a ventricular rate of 61 beats per minute.  QT/QTC of 458/461.  Denies any cardiopulmonary complaints at this time.  Plan:  -tele monitoring  -trend trops  -repeat EKG if new  -echocardiogram  -cards consult in a.m.

## 2022-12-17 NOTE — H&P
[facility] Medicine  History & Physical    Patient Name: Henna Jorge  MRN: [id number]  Patient Class: OP- Observation  Admission Date: 12/16/2022  Attending Physician: Shaheen Blanchard MD   Primary Care Provider: Mary Corbett MD         Patient information was obtained from patient, past medical records and ER records.     Subjective:     Principal Problem:Hypertensive urgency    Chief Complaint:   Chief Complaint   Patient presents with    Pain     Pt c/o pain x 1 month.  Pain is located in her foot, leg, back, and hip.  Pt reports a history of gout.  Pt denies recent falls or injuries.        HPI: Henna Jorge is a 89 y.o. female with a PMH  has a past medical history of Arthritis, Back pain, Cancer, Disorder of kidney and ureter, Glaucoma, Gout, chronic, Hypertension, Osteopenia, and Osteoporosis.  Presented to the ER this evening for evaluation of persistent pain x1 month in her feet, legs, hip, and low back.  Patient states that her foot and leg pain is typical for gout pain, but states that her hip and her back pain is due to because she has been immobile for the last week or so secondary staying in his seated position most of the day.  Denies any injury or trauma which may have caused the onset of worsening of her symptoms.  However, upon triage was noted that patient was hypertensive with a BP reading of 211/124.  Patient denied any associated symptoms such as headache, lightheadedness, visual disturbances, chest pain, palpitations, shortness of breath, dyspnea exertion, generalized weakness, or any other symptoms at this time.  Patient reports compliance with home medications.  Denies any recent illnesses or sick contacts.  Denies any pain at this time.  Patient states she is not established with a cardiologist.  Hospital Medicine consulted to admit patient or elevated blood pressure readings, elevated troponin, elevated BNP.  Patient and family member at bedside in  agreement with treatment plan.  Patient will be admitted in the observation setting.    PCP: Mary Corbett        Past Medical History:   Diagnosis Date    Arthritis     Back pain     sometimes    Cancer     hx of cervical    Disorder of kidney and ureter     Glaucoma     Gout, chronic     Hypertension     Osteopenia     Osteoporosis        Past Surgical History:   Procedure Laterality Date    EYE SURGERY      glaucoma    TONSILLECTOMY      TOTAL ABDOMINAL HYSTERECTOMY         Review of patient's allergies indicates:  No Known Allergies    No current facility-administered medications on file prior to encounter.     Current Outpatient Medications on File Prior to Encounter   Medication Sig    alendronate (FOSAMAX) 70 MG tablet TAKE 1 TABLET BY MOUTH EVERY 7 DAYS    allopurinoL (ZYLOPRIM) 300 MG tablet Take 1 tablet (300 mg total) by mouth once daily.    cholecalciferol, vitamin D3, 125 mcg (5,000 unit) Tab Take 5,000 Units by mouth once daily.    colchicine (COLCRYS) 0.6 mg tablet Take 1 tablet (0.6 mg total) by mouth 2 (two) times daily as needed (gout attack). TAKE 1 TABLET BY MOUTH TWICE DAILY AS NEEDED FOR GOUT    dorzolamide-timolol 2-0.5% (COSOPT) 2-0.5 % ophthalmic solution Place 1 drop into both eyes 2 (two) times daily.    latanoprost 0.005 % ophthalmic solution Place 1 drop into both eyes nightly.    losartan-hydrochlorothiazide 100-12.5 mg (HYZAAR) 100-12.5 mg Tab TAKE 1 TABLET BY MOUTH EVERY DAY     Family History       Problem Relation (Age of Onset)    Hypertension Mother    No Known Problems Father          Tobacco Use    Smoking status: Former    Smokeless tobacco: Never   Substance and Sexual Activity    Alcohol use: No    Drug use: No    Sexual activity: Never     Review of Systems   Respiratory:  Negative for cough, chest tightness, shortness of breath and wheezing.    Cardiovascular:  Positive for leg swelling. Negative for chest pain and palpitations.   Musculoskeletal:  Positive for arthralgias,  back pain and myalgias.   All other systems reviewed and are negative.  Objective:     Vital Signs (Most Recent):  Temp: 98.3 °F (36.8 °C) (12/16/22 2350)  Pulse: 74 (12/16/22 2350)  Resp: 17 (12/16/22 2350)  BP: (!) 180/96 (12/16/22 2350)  SpO2: 96 % (12/16/22 2350)   Vital Signs (24h Range):  Temp:  [98.3 °F (36.8 °C)-99 °F (37.2 °C)] 98.3 °F (36.8 °C)  Pulse:  [56-74] 74  Resp:  [16-20] 17  SpO2:  [96 %-100 %] 96 %  BP: (156-211)/() 180/96     Weight: 85.2 kg (187 lb 13.3 oz)  Body mass index is 32.24 kg/m².    Physical Exam  Vitals and nursing note reviewed.   Constitutional:       General: She is awake. She is not in acute distress.     Appearance: Normal appearance. She is well-developed and well-groomed. She is not ill-appearing, toxic-appearing or diaphoretic.   HENT:      Head: Normocephalic and atraumatic.      Mouth/Throat:      Lips: Pink.      Mouth: Mucous membranes are moist.      Pharynx: Oropharynx is clear. Uvula midline.   Eyes:      Extraocular Movements: Extraocular movements intact.      Conjunctiva/sclera: Conjunctivae normal.      Pupils: Pupils are equal, round, and reactive to light.   Cardiovascular:      Rate and Rhythm: Normal rate and regular rhythm.      Pulses:           Radial pulses are 2+ on the right side and 2+ on the left side.        Dorsalis pedis pulses are 2+ on the right side and 2+ on the left side.      Heart sounds: Normal heart sounds. No murmur heard.     Comments: Mild non-pitting edema to bilateral lower extremities  Pulmonary:      Effort: Pulmonary effort is normal.      Breath sounds: Normal breath sounds.   Abdominal:      General: Bowel sounds are normal.      Palpations: Abdomen is soft.      Tenderness: There is no abdominal tenderness.   Musculoskeletal:      Cervical back: Normal range of motion and neck supple.      Comments: 5/5 strength throughout   Skin:     General: Skin is warm and dry.      Capillary Refill: Capillary refill takes less than 2  seconds.   Neurological:      Mental Status: She is alert and oriented to person, place, and time. Mental status is at baseline.      GCS: GCS eye subscore is 4. GCS verbal subscore is 5. GCS motor subscore is 6.      Cranial Nerves: Cranial nerves 2-12 are intact.      Sensory: Sensation is intact.      Motor: Motor function is intact.      Deep Tendon Reflexes: Reflexes are normal and symmetric.   Psychiatric:         Mood and Affect: Mood normal.         Speech: Speech normal.         Behavior: Behavior normal. Behavior is cooperative.         CRANIAL NERVES     CN III, IV, VI   Pupils are equal, round, and reactive to light.     LABS:  Recent Results (from the past 24 hour(s))   CBC auto differential    Collection Time: 12/16/22  2:28 PM   Result Value Ref Range    WBC 6.07 3.90 - 12.70 K/uL    RBC 4.47 4.00 - 5.40 M/uL    Hemoglobin 12.1 12.0 - 16.0 g/dL    Hematocrit 37.4 37.0 - 48.5 %    MCV 84 82 - 98 fL    MCH 27.1 27.0 - 31.0 pg    MCHC 32.4 32.0 - 36.0 g/dL    RDW 14.5 11.5 - 14.5 %    Platelets 336 150 - 450 K/uL    MPV 10.1 9.2 - 12.9 fL    Immature Granulocytes 0.2 0.0 - 0.5 %    Gran # (ANC) 3.6 1.8 - 7.7 K/uL    Immature Grans (Abs) 0.01 0.00 - 0.04 K/uL    Lymph # 2.0 1.0 - 4.8 K/uL    Mono # 0.4 0.3 - 1.0 K/uL    Eos # 0.0 0.0 - 0.5 K/uL    Baso # 0.02 0.00 - 0.20 K/uL    nRBC 0 0 /100 WBC    Gran % 59.3 38.0 - 73.0 %    Lymph % 32.6 18.0 - 48.0 %    Mono % 6.9 4.0 - 15.0 %    Eosinophil % 0.7 0.0 - 8.0 %    Basophil % 0.3 0.0 - 1.9 %    Differential Method Automated    Comprehensive metabolic panel    Collection Time: 12/16/22  2:28 PM   Result Value Ref Range    Sodium 144 136 - 145 mmol/L    Potassium 3.7 3.5 - 5.1 mmol/L    Chloride 110 95 - 110 mmol/L    CO2 22 (L) 23 - 29 mmol/L    Glucose 100 70 - 110 mg/dL    BUN 18 8 - 23 mg/dL    Creatinine 1.0 0.5 - 1.4 mg/dL    Calcium 9.3 8.7 - 10.5 mg/dL    Total Protein 7.1 6.0 - 8.4 g/dL    Albumin 3.2 (L) 3.5 - 5.2 g/dL    Total Bilirubin 0.3 0.1 -  1.0 mg/dL    Alkaline Phosphatase 63 55 - 135 U/L    AST 20 10 - 40 U/L    ALT 16 10 - 44 U/L    Anion Gap 12 8 - 16 mmol/L    eGFR 54 (A) >60 mL/min/1.73 m^2   Brain natriuretic peptide    Collection Time: 12/16/22  2:28 PM   Result Value Ref Range     (H) 0 - 99 pg/mL   Troponin I    Collection Time: 12/16/22  2:28 PM   Result Value Ref Range    Troponin I 0.038 (H) 0.000 - 0.026 ng/mL   Troponin I    Collection Time: 12/16/22  5:50 PM   Result Value Ref Range    Troponin I 0.061 (H) 0.000 - 0.026 ng/mL   Troponin I    Collection Time: 12/16/22 10:24 PM   Result Value Ref Range    Troponin I 0.074 (H) 0.000 - 0.026 ng/mL       RADIOLOGY  X-Ray Chest AP Portable    Result Date: 12/16/2022  EXAMINATION: XR CHEST AP PORTABLE CLINICAL HISTORY: Hypertension; COMPARISON: No comparison studies are available. FINDINGS: The study is slightly rotated to the left.  Basilar reticular interstitial changes.  The lungs are otherwise clear.  The cardiac silhouette size is on the upper limits of normal.  The trachea is midline and the mediastinal width is normal. Negative for focal infiltrate, effusion or pneumothorax. Pulmonary vasculature is normal. Negative for osseous abnormalities. Tortuous aorta with calcifications of the aortic knob.  There are degenerative changes of the spine and both shoulder girdles.  Convex-right curvature of the lower thoracic spine. Prominence of the left 1st costochondral cartilage noted.     1.  Basilar reticular interstitial changes.  Chronic underlying interstitial lung disease versus interstitial infectious process versus mild interstitial pulmonary edema could have this appearance. 2.  Incidental findings as noted above. Electronically signed by: Lawrence Kruger MD Date:    12/16/2022 Time:    15:17      EKG    MICROBIOLOGY    MDM     Amount and/or Complexity of Data Reviewed  Clinical lab tests: reviewed  Tests in the radiology section of CPT®: reviewed  Tests in the medicine section of  CPT®: reviewed  Discussion of test results with the performing providers: yes  Decide to obtain previous medical records or to obtain history from someone other than the patient: yes  Obtain history from someone other than the patient: yes  Review and summarize past medical records: yes  Discuss the patient with other providers: yes  Independent visualization of images, tracings, or specimens: yes          Assessment/Plan:     * Hypertensive urgency  Patient has a current diagnosis of Hypertensive emergency with end organ damage evidenced by acute pulmonary edema without heart failure which is controlled.  Latest blood pressure and vitals reviewed-   Temp:  [98.3 °F (36.8 °C)-99 °F (37.2 °C)]   Pulse:  [56-74]   Resp:  [16-20]   BP: (156-211)/()   SpO2:  [96 %-100 %] .   Patient currently off IV antihypertensives.   Home meds for hypertension were reviewed and noted below.   Hypertension Medications               losartan-hydrochlorothiazide 100-12.5 mg (HYZAAR) 100-12.5 mg Tab TAKE 1 TABLET BY MOUTH EVERY DAY            Medication adjustment for hospital antihypertensives is as follows- lasix    Will aim for controlled BP reduction by medications noted above. Monitor and mitigate end organ damage as indicated.    Elevated brain natriuretic peptide (BNP) level   Elevated BNP of 701.  Chest x-ray reveals infection versus pulmonary edema.  No history of CHF.  Denies any cardiopulmonary complaints.  Not established with Cardiology.  Nonpitting edema noted to bilateral lower extremities.  Decreased lung sounds noted without any audible wheezes, rhonchi, or rales noted.  40 mg IV Lasix given in ED.  Plan:  -tele monitoring  -echocardiogram  -cards consult in am  -consider additional lasix as needed      Elevated troponin   Initial troponin 0.038 with delta troponin being 0.061.  EKG revealed normal sinus rhythm with a ventricular rate of 61 beats per minute.  QT/QTC of 458/461.  Denies any cardiopulmonary  complaints at this time.  Plan:  -tele monitoring  -trend trops  -repeat EKG if new  -echocardiogram  -cards consult in a.m.      Gout, chronic  Chronic.  Stable.  Compliant with home medication.  Plan:  -continue allopurinol      Primary open-angle glaucoma, right eye, severe stage  Chronic.  Stable.  Voices new complaints when vision.  Compliant with home medication.  Plan:  -continue eyedrops (Cosopt and latanoprost)        Primary open-angle glaucoma, left eye, mild stage  Chronic.  Stable.  Voices new complaints when vision.  Compliant with home medication.  Plan:  -continue eyedrops (Cosopt and latanoprost)      Osteoarthritis of spine with radiculopathy, lumbar region  Chronic.  Well controlled with home medications.  Plan:  -analgesics as needed      Obesity (BMI 30.0-34.9)  Body mass index is 32.24 kg/m². Elevation likely secondary to increased calorie intake and sedentary lifestyle. Patient educated on morbidity and mortality in regards to elevated BMI and stressed importance of diet and exercise.   Plan:  -low fat/low calorie diet         VTE Risk Mitigation (From admission, onward)           Ordered     IP VTE HIGH RISK PATIENT  Once         12/16/22 2210     Place sequential compression device  Until discontinued         12/16/22 2210                   //Core Measures   -DVT proph: SCDs, withholding anticoagulation pending surgical intervention   -Code status Full  -Surrogate: daughter      Components of this note were documented using a voice recognition system and are subject to errors not corrected at the time the document was proof read. Please contact the author for any clarifications.       Favio Blanchard NP  Department of Hospital Medicine   [facility] ([facility])

## 2022-12-17 NOTE — HPI
From HOspital medicine  Henna Jorge is a 89 y.o. female with a PMH  has a past medical history of Arthritis, Back pain, Cancer, Disorder of kidney and ureter, Glaucoma, Gout, chronic, Hypertension, Osteopenia, and Osteoporosis.  Presented to the ER this evening for evaluation of persistent pain x1 month in her feet, legs, hip, and low back.  Patient states that her foot and leg pain is typical for gout pain, but states that her hip and her back pain is due to because she has been immobile for the last week or so secondary staying in his seated position most of the day.  Denies any injury or trauma which may have caused the onset of worsening of her symptoms.  However, upon triage was noted that patient was hypertensive with a BP reading of 211/124.  Patient denied any associated symptoms such as headache, lightheadedness, visual disturbances, chest pain, palpitations, shortness of breath, dyspnea exertion, generalized weakness, or any other symptoms at this time.  Patient reports compliance with home medications.  Denies any recent illnesses or sick contacts.  Denies any pain at this time.  Patient states she is not established with a cardiologist.  Hospital Medicine consulted to admit patient or elevated blood pressure readings, elevated troponin, elevated BNP.  Patient and family member at bedside in agreement with treatment plan.  Patient will be admitted in the observation setting.    Cards consult obtained for elevated troponin and bnp. She has no cardiac symptoms. She is non compliant with salt intake and meds. No chf symptoms or leg swelling.

## 2022-12-17 NOTE — HPI
Henna Jorge is a 89 y.o. female with a PMH  has a past medical history of Arthritis, Back pain, Cancer, Disorder of kidney and ureter, Glaucoma, Gout, chronic, Hypertension, Osteopenia, and Osteoporosis.  Presented to the ER this evening for evaluation of persistent pain x1 month in her feet, legs, hip, and low back.  Patient states that her foot and leg pain is typical for gout pain, but states that her hip and her back pain is due to because she has been immobile for the last week or so secondary staying in his seated position most of the day.  Denies any injury or trauma which may have caused the onset of worsening of her symptoms.  However, upon triage was noted that patient was hypertensive with a BP reading of 211/124.  Patient denied any associated symptoms such as headache, lightheadedness, visual disturbances, chest pain, palpitations, shortness of breath, dyspnea exertion, generalized weakness, or any other symptoms at this time.  Patient reports compliance with home medications.  Denies any recent illnesses or sick contacts.  Denies any pain at this time.  Patient states she is not established with a cardiologist.  Hospital Medicine consulted to admit patient or elevated blood pressure readings, elevated troponin, elevated BNP.  Patient and family member at bedside in agreement with treatment plan.  Patient will be admitted in the inpatient setting.    PCP: Mary Corbett

## 2022-12-17 NOTE — DISCHARGE SUMMARY
[facility] ([facility]  Discharge Summary      Patient Name: Henna Jorge  MRN: [id number]  [facility]: [id number]  Patient Class: OP- Observation  Admission Date: 12/16/2022  Hospital Length of Stay: 0 days  Discharge Date and Time: 12/17/2022  4:35 PM  Attending Physician: Yonathan Jain  Discharging Provider: Ree Abraham NP  Primary Care Provider: Mary Corbett MD    Primary Care Team: Networked reference to record PCT     HPI:   Henna Jorge is a 89 y.o. female with a PMH  has a past medical history of Arthritis, Back pain, Cancer, Disorder of kidney and ureter, Glaucoma, Gout, chronic, Hypertension, Osteopenia, and Osteoporosis.  Presented to the ER this evening for evaluation of persistent pain x1 month in her feet, legs, hip, and low back.  Patient states that her foot and leg pain is typical for gout pain, but states that her hip and her back pain is due to because she has been immobile for the last week or so secondary staying in his seated position most of the day.  Denies any injury or trauma which may have caused the onset of worsening of her symptoms.  However, upon triage was noted that patient was hypertensive with a BP reading of 211/124.  Patient denied any associated symptoms such as headache, lightheadedness, visual disturbances, chest pain, palpitations, shortness of breath, dyspnea exertion, generalized weakness, or any other symptoms at this time.  Patient reports compliance with home medications.  Denies any recent illnesses or sick contacts.  Denies any pain at this time.  Patient states she is not established with a cardiologist.  Hospital Medicine consulted to admit patient or elevated blood pressure readings, elevated troponin, elevated BNP.  Patient and family member at bedside in agreement with treatment plan.  Patient will be admitted in the inpatient setting.    PCP: Mary Corbett        * No surgery found *      Hospital Course:   Pt admitted to Observation for  Hypertensive Urgency. Chest xray showed basilar reticular interstitial changes.  Chronic underlying interstitial lung disease versus interstitial infectious process versus mild interstitial pulmonary edema could have this appearance.  with Troponin 0.038>0.074>0.054. Lasix x  2 doses given. Echo results pending. Pt reports increased stress in addition to medication and dietary noncompliance with patient counseled on the importance of maintaining compliance with understanding verbalized. Echo results showed moderate concentric hypertrophy and normal systolic function. EF 60%. Indeterminate left ventricular diastolic function. Mild mitral regurgitation. BP improved on prescribed regime. Pt counseled to maintain compliance with prescribed medication regime, dietary restrictions, and follow up appointments with understanding verbalized. Pt seen by cardiology prior to discharge with recommendations received. Pt seen and examined and deemed stable for discharge to home. Medications reconciled for discharge. Pt instructed to follow up with Podiatry, PCP, and Cardiology upon discharge for further evaluation.        Goals of Care Treatment Preferences:  Code Status: Full Code      Consults:   Consults (From admission, onward)        Status Ordering Provider     Inpatient consult to Cardiology  Once        Provider:  Hi Merlos MD    Completed TORRI CHERY          Final Active Diagnoses:    Diagnosis Date Noted POA    PRINCIPAL PROBLEM:  Hypertensive urgency [I16.0] 12/17/2022 Yes    Elevated brain natriuretic peptide (BNP) level [R79.89] 12/17/2022 Yes    Elevated troponin [R77.8] 12/17/2022 Yes    Primary open-angle glaucoma, right eye, severe stage [H40.1113] 11/04/2019 Yes    Primary open-angle glaucoma, left eye, mild stage [H40.1121] 11/04/2019 Yes    Osteoarthritis of spine with radiculopathy, lumbar region [M47.26] 09/20/2019 Yes    Obesity (BMI 30.0-34.9) [E66.9] 02/20/2018 Yes    Gout,  chronic [M1A.9XX0]  Yes      Problems Resolved During this Admission:       Discharged Condition: stable    Disposition: Home or Self Care    Follow Up:   Follow-up Information     Marika Bean DPM. Schedule an appointment as soon as possible for a visit in 2 days.    Specialty: Podiatry  Why: For re-evaluation and further treatment  Contact information:  [address]  [place] [address]  [phone]             Yola Vasquez PA-C Follow up in 1 week(s).    Specialty: Cardiology  Why: -hospital follow up - evaluation for outpatient ischemic work up  Contact information:  [address] [place] [address] DR Sharlene QUINONEZ [address]  [phone]             Mary Corbett MD Follow up in 3 day(s).    Specialty: Family Medicine  Why: -hospital follow up and medication review  Contact information:  [address]  [place] [address]  [phone]                       Patient Instructions:      Diet Cardiac     Diet diabetic     Notify your health care provider if you experience any of the following:  temperature >100.4     Notify your health care provider if you experience any of the following:  persistent nausea and vomiting or diarrhea     Notify your health care provider if you experience any of the following:  increased confusion or weakness     Notify your health care provider if you experience any of the following:  persistent dizziness, light-headedness, or visual disturbances     Notify your health care provider if you experience any of the following:  difficulty breathing or increased cough     Notify your health care provider if you experience any of the following:  redness, tenderness, or signs of infection (pain, swelling, redness, odor or green/yellow discharge around incision site)     Activity as tolerated       Significant Diagnostic Studies: Labs: All labs within the past 24 hours have been reviewed    Pending Diagnostic Studies:     None         Medications:  Reconciled Home Medications:       Medication List      CONTINUE taking these medications    alendronate 70 MG tablet  Commonly known as: FOSAMAX  TAKE 1 TABLET BY MOUTH EVERY 7 DAYS     allopurinoL 300 MG tablet  Commonly known as: ZYLOPRIM  Take 1 tablet (300 mg total) by mouth once daily.     cholecalciferol (vitamin D3) 125 mcg (5,000 unit) Tab  Take 5,000 Units by mouth once daily.     colchicine 0.6 mg tablet  Commonly known as: COLCRYS  Take 1 tablet (0.6 mg total) by mouth 2 (two) times daily as needed (gout attack). TAKE 1 TABLET BY MOUTH TWICE DAILY AS NEEDED FOR GOUT     dorzolamide-timolol 2-0.5% 22.3-6.8 mg/mL ophthalmic solution  Commonly known as: COSOPT  Place 1 drop into both eyes 2 (two) times daily.     latanoprost 0.005 % ophthalmic solution  Place 1 drop into both eyes nightly.     losartan-hydrochlorothiazide 100-12.5 mg 100-12.5 mg Tab  Commonly known as: HYZAAR  TAKE 1 TABLET BY MOUTH EVERY DAY            Indwelling Lines/Drains at time of discharge:   Lines/Drains/Airways     None                 Time spent on the discharge of patient: > 36 minutes         Ree Abraham NP  Department of Hospital Medicine  [facility] ([facility])

## 2022-12-17 NOTE — ASSESSMENT & PLAN NOTE
Secondary to non compliance with salt intake and meds. Counseled about taking meds regularily .discussed low salt diet in detail.will obtain echo

## 2022-12-17 NOTE — ASSESSMENT & PLAN NOTE
Patient has a current diagnosis of Hypertensive emergency with end organ damage evidenced by acute pulmonary edema without heart failure which is controlled.  Latest blood pressure and vitals reviewed-   Temp:  [98.3 °F (36.8 °C)-99 °F (37.2 °C)]   Pulse:  [56-74]   Resp:  [16-20]   BP: (156-211)/()   SpO2:  [96 %-100 %] .   Patient currently off IV antihypertensives.   Home meds for hypertension were reviewed and noted below.   Hypertension Medications             losartan-hydrochlorothiazide 100-12.5 mg (HYZAAR) 100-12.5 mg Tab TAKE 1 TABLET BY MOUTH EVERY DAY          Medication adjustment for hospital antihypertensives is as follows- lasix    Will aim for controlled BP reduction by medications noted above. Monitor and mitigate end organ damage as indicated.

## 2022-12-17 NOTE — HOSPITAL COURSE
Pt admitted to Observation for Hypertensive Urgency. Chest xray showed basilar reticular interstitial changes.  Chronic underlying interstitial lung disease versus interstitial infectious process versus mild interstitial pulmonary edema could have this appearance.  with Troponin 0.038>0.074>0.054. Lasix x  2 doses given. Echo results pending. Pt reports increased stress in addition to medication and dietary noncompliance with patient counseled on the importance of maintaining compliance with understanding verbalized. Echo results showed moderate concentric hypertrophy and normal systolic function. EF 60%. Indeterminate left ventricular diastolic function. Mild mitral regurgitation. BP improved on prescribed regime. Pt counseled to maintain compliance with prescribed medication regime, dietary restrictions, and follow up appointments with understanding verbalized. Pt seen by cardiology prior to discharge with recommendations received. Pt seen and examined and deemed stable for discharge to home. Medications reconciled for discharge. Pt instructed to follow up with Podiatry, PCP, and Cardiology upon discharge for further evaluation.

## 2022-12-17 NOTE — SUBJECTIVE & OBJECTIVE
Past Medical History:   Diagnosis Date    Arthritis     Back pain     sometimes    Cancer     hx of cervical    Disorder of kidney and ureter     Glaucoma     Gout, chronic     Hypertension     Osteopenia     Osteoporosis        Past Surgical History:   Procedure Laterality Date    EYE SURGERY      glaucoma    TONSILLECTOMY      TOTAL ABDOMINAL HYSTERECTOMY         Review of patient's allergies indicates:  No Known Allergies    No current facility-administered medications on file prior to encounter.     Current Outpatient Medications on File Prior to Encounter   Medication Sig    alendronate (FOSAMAX) 70 MG tablet TAKE 1 TABLET BY MOUTH EVERY 7 DAYS    allopurinoL (ZYLOPRIM) 300 MG tablet Take 1 tablet (300 mg total) by mouth once daily.    cholecalciferol, vitamin D3, 125 mcg (5,000 unit) Tab Take 5,000 Units by mouth once daily.    colchicine (COLCRYS) 0.6 mg tablet Take 1 tablet (0.6 mg total) by mouth 2 (two) times daily as needed (gout attack). TAKE 1 TABLET BY MOUTH TWICE DAILY AS NEEDED FOR GOUT    dorzolamide-timolol 2-0.5% (COSOPT) 2-0.5 % ophthalmic solution Place 1 drop into both eyes 2 (two) times daily.    latanoprost 0.005 % ophthalmic solution Place 1 drop into both eyes nightly.    losartan-hydrochlorothiazide 100-12.5 mg (HYZAAR) 100-12.5 mg Tab TAKE 1 TABLET BY MOUTH EVERY DAY     Family History       Problem Relation (Age of Onset)    Hypertension Mother    No Known Problems Father          Tobacco Use    Smoking status: Former    Smokeless tobacco: Never   Substance and Sexual Activity    Alcohol use: No    Drug use: No    Sexual activity: Never     Review of Systems   Constitutional: Negative for diaphoresis, malaise/fatigue and weight gain.   HENT:  Negative for hoarse voice.    Eyes:  Negative for double vision and visual disturbance.   Cardiovascular:  Negative for chest pain, claudication, cyanosis, dyspnea on exertion, irregular heartbeat, leg swelling, near-syncope, orthopnea,  palpitations, paroxysmal nocturnal dyspnea and syncope.   Respiratory:  Negative for cough, hemoptysis, shortness of breath and snoring.    Hematologic/Lymphatic: Negative for bleeding problem. Does not bruise/bleed easily.   Skin:  Negative for color change and poor wound healing.   Musculoskeletal:  Positive for arthritis, gout and joint pain. Negative for muscle cramps, muscle weakness and myalgias.   Gastrointestinal:  Negative for bloating, abdominal pain, change in bowel habit, diarrhea, heartburn, hematemesis, hematochezia, melena and nausea.   Neurological:  Negative for excessive daytime sleepiness, dizziness, headaches, light-headedness, loss of balance, numbness and weakness.   Psychiatric/Behavioral:  Negative for memory loss. The patient does not have insomnia.    Allergic/Immunologic: Negative for hives.   Objective:     Vital Signs (Most Recent):  Temp: 98.5 °F (36.9 °C) (12/17/22 1119)  Pulse: 67 (12/17/22 1119)  Resp: 20 (12/17/22 1119)  BP: (!) 168/79 (12/17/22 1119)  SpO2: 95 % (12/17/22 1119)   Vital Signs (24h Range):  Temp:  [98.2 °F (36.8 °C)-99 °F (37.2 °C)] 98.5 °F (36.9 °C)  Pulse:  [56-83] 67  Resp:  [16-20] 20  SpO2:  [95 %-100 %] 95 %  BP: (139-211)/() 168/79     Weight: 85.3 kg (188 lb)  Body mass index is 29.44 kg/m².    SpO2: 95 %         Intake/Output Summary (Last 24 hours) at 12/17/2022 1144  Last data filed at 12/16/2022 1838  Gross per 24 hour   Intake --   Output 900 ml   Net -900 ml       Lines/Drains/Airways       Peripheral Intravenous Line  Duration                  Peripheral IV - Single Lumen 12/16/22 1436 22 G Anterior;Proximal;Right Forearm <1 day                    Physical Exam  Constitutional:       General: She is not in acute distress.     Appearance: Normal appearance. She is well-developed. She is obese. She is not ill-appearing.   HENT:      Head: Normocephalic and atraumatic.   Eyes:      General: No scleral icterus.     Pupils: Pupils are equal, round,  and reactive to light.   Neck:      Thyroid: No thyromegaly.      Vascular: Normal carotid pulses. No carotid bruit, hepatojugular reflux or JVD.      Trachea: No tracheal deviation.   Cardiovascular:      Rate and Rhythm: Normal rate and regular rhythm.      Pulses: Normal pulses.      Heart sounds: Normal heart sounds. No murmur heard.    No friction rub. No gallop.   Pulmonary:      Effort: Pulmonary effort is normal. No respiratory distress.      Breath sounds: Normal breath sounds. No wheezing, rhonchi or rales.   Chest:      Chest wall: No tenderness.   Abdominal:      General: Bowel sounds are normal. There is no abdominal bruit.      Palpations: Abdomen is soft. There is no hepatomegaly or pulsatile mass.      Tenderness: There is no abdominal tenderness.   Musculoskeletal:      Right shoulder: No deformity.      Cervical back: Normal range of motion and neck supple.      Right lower leg: No edema.      Left lower leg: No edema.   Skin:     General: Skin is warm and dry.      Findings: No erythema or rash.      Nails: There is no clubbing.   Neurological:      Mental Status: She is alert and oriented to person, place, and time.      Cranial Nerves: No cranial nerve deficit.      Coordination: Coordination normal.   Psychiatric:         Speech: Speech normal.         Behavior: Behavior normal.         Judgment: Judgment normal.       Significant Labs:   Recent Lab Results  (Last 5 results in the past 24 hours)        12/17/22  0544   12/17/22  0228   12/16/22  2224   12/16/22  1750   12/16/22  1428        Albumin         3.2       Alkaline Phosphatase         63       ALT         16       ANION GAP         12       AST         20       Baso #         0.02       Basophil %         0.3       BILIRUBIN TOTAL         0.3  Comment: For infants and newborns, interpretation of results should be based  on gestational age, weight and in agreement with clinical  observations.    Premature Infant recommended reference  ranges:  Up to 24 hours.............<8.0 mg/dL  Up to 48 hours............<12.0 mg/dL  3-5 days..................<15.0 mg/dL  6-29 days.................<15.0 mg/dL         BNP         701  Comment: Values of less than 100 pg/ml are consistent with non-CHF populations.       BUN         18       Calcium         9.3       Chloride         110       CO2         22       Creatinine         1.0       Differential Method         Automated       eGFR         54       Eos #         0.0       Eosinophil %         0.7       Glucose         100       Gran # (ANC)         3.6       Gran %         59.3       HEMATOCRIT         37.4       HEMOGLOBIN         12.1       Immature Grans (Abs)         0.01  Comment: Mild elevation in immature granulocytes is non specific and   can be seen in a variety of conditions including stress response,   acute inflammation, trauma and pregnancy. Correlation with other   laboratory and clinical findings is essential.         Immature Granulocytes         0.2       Lymph #         2.0       Lymph %         32.6       MCH         27.1       MCHC         32.4       MCV         84       Mono #         0.4       Mono %         6.9       MPV         10.1       nRBC         0       Platelets         336       POCT Glucose 96               Potassium         3.7       PROTEIN TOTAL         7.1       RBC         4.47       RDW         14.5       Sodium         144       Troponin I   0.054  Comment: The reference interval for Troponin I represents the 99th percentile   cutoff   for our facility and is consistent with 3rd generation assay   performance.     0.074  Comment: The reference interval for Troponin I represents the 99th percentile   cutoff   for our facility and is consistent with 3rd generation assay   performance.     0.061  Comment: The reference interval for Troponin I represents the 99th percentile   cutoff   for our facility and is consistent with 3rd generation assay   performance.      0.038  Comment: The reference interval for Troponin I represents the 99th percentile   cutoff   for our facility and is consistent with 3rd generation assay   performance.         WBC         6.07                              Significant Imaging:

## 2023-01-16 ENCOUNTER — PATIENT MESSAGE (OUTPATIENT)
Dept: INTERNAL MEDICINE | Facility: CLINIC | Age: 88
End: 2023-01-16
Payer: MEDICARE

## 2023-01-18 PROBLEM — I27.20 PULMONARY HYPERTENSION: Status: ACTIVE | Noted: 2023-01-18

## 2023-01-18 PROBLEM — I70.0 AORTIC ATHEROSCLEROSIS: Status: ACTIVE | Noted: 2023-01-18

## 2023-01-18 PROBLEM — M1A.9XX0 CHRONIC GOUT WITHOUT TOPHUS: Status: ACTIVE | Noted: 2023-01-18

## 2023-01-18 PROBLEM — I77.1 TORTUOUS AORTA: Status: ACTIVE | Noted: 2023-01-18

## 2023-01-25 ENCOUNTER — OFFICE VISIT (OUTPATIENT)
Dept: INTERNAL MEDICINE | Facility: CLINIC | Age: 88
End: 2023-01-25
Payer: MEDICARE

## 2023-01-25 ENCOUNTER — LAB VISIT (OUTPATIENT)
Dept: LAB | Facility: HOSPITAL | Age: 88
End: 2023-01-25
Attending: PSYCHIATRY & NEUROLOGY
Payer: MEDICARE

## 2023-01-25 VITALS
DIASTOLIC BLOOD PRESSURE: 70 MMHG | HEART RATE: 74 BPM | DIASTOLIC BLOOD PRESSURE: 76 MMHG | SYSTOLIC BLOOD PRESSURE: 162 MMHG | HEIGHT: 67 IN | WEIGHT: 189.13 LBS | OXYGEN SATURATION: 98 % | SYSTOLIC BLOOD PRESSURE: 146 MMHG | WEIGHT: 189 LBS | RESPIRATION RATE: 20 BRPM | HEART RATE: 58 BPM | BODY MASS INDEX: 29.66 KG/M2 | BODY MASS INDEX: 29.69 KG/M2 | HEIGHT: 67 IN

## 2023-01-25 DIAGNOSIS — M47.26 OSTEOARTHRITIS OF SPINE WITH RADICULOPATHY, LUMBAR REGION: ICD-10-CM

## 2023-01-25 DIAGNOSIS — I10 ESSENTIAL HYPERTENSION: ICD-10-CM

## 2023-01-25 DIAGNOSIS — M16.0 PRIMARY OSTEOARTHRITIS OF BOTH HIPS: ICD-10-CM

## 2023-01-25 DIAGNOSIS — R79.89 ELEVATED TROPONIN: ICD-10-CM

## 2023-01-25 DIAGNOSIS — H40.1113 PRIMARY OPEN-ANGLE GLAUCOMA, RIGHT EYE, SEVERE STAGE: ICD-10-CM

## 2023-01-25 DIAGNOSIS — M1A.0790 IDIOPATHIC CHRONIC GOUT OF FOOT WITHOUT TOPHUS, UNSPECIFIED LATERALITY: ICD-10-CM

## 2023-01-25 DIAGNOSIS — I70.0 AORTIC CALCIFICATION: ICD-10-CM

## 2023-01-25 DIAGNOSIS — M17.0 PRIMARY OSTEOARTHRITIS OF BOTH KNEES: ICD-10-CM

## 2023-01-25 DIAGNOSIS — M81.0 OSTEOPOROSIS, UNSPECIFIED OSTEOPOROSIS TYPE, UNSPECIFIED PATHOLOGICAL FRACTURE PRESENCE: ICD-10-CM

## 2023-01-25 DIAGNOSIS — I77.1 TORTUOUS AORTA: ICD-10-CM

## 2023-01-25 DIAGNOSIS — N18.31 STAGE 3A CHRONIC KIDNEY DISEASE: ICD-10-CM

## 2023-01-25 DIAGNOSIS — J30.1 CHRONIC SEASONAL ALLERGIC RHINITIS DUE TO POLLEN: ICD-10-CM

## 2023-01-25 DIAGNOSIS — M81.0 AGE-RELATED OSTEOPOROSIS WITHOUT CURRENT PATHOLOGICAL FRACTURE: ICD-10-CM

## 2023-01-25 DIAGNOSIS — M25.551 RIGHT HIP PAIN: ICD-10-CM

## 2023-01-25 DIAGNOSIS — I70.0 AORTIC ATHEROSCLEROSIS: ICD-10-CM

## 2023-01-25 DIAGNOSIS — N18.32 STAGE 3B CHRONIC KIDNEY DISEASE: ICD-10-CM

## 2023-01-25 DIAGNOSIS — I27.20 PULMONARY HYPERTENSION: ICD-10-CM

## 2023-01-25 DIAGNOSIS — I10 ESSENTIAL HYPERTENSION: Primary | ICD-10-CM

## 2023-01-25 DIAGNOSIS — R41.3 MEMORY DEFICIT: ICD-10-CM

## 2023-01-25 DIAGNOSIS — Z85.41 HISTORY OF CERVICAL CANCER: ICD-10-CM

## 2023-01-25 DIAGNOSIS — M1A.9XX0 CHRONIC GOUT WITHOUT TOPHUS, UNSPECIFIED CAUSE, UNSPECIFIED SITE: ICD-10-CM

## 2023-01-25 DIAGNOSIS — Z00.00 ENCOUNTER FOR PREVENTATIVE ADULT HEALTH CARE EXAMINATION: Primary | ICD-10-CM

## 2023-01-25 DIAGNOSIS — H40.1121 PRIMARY OPEN-ANGLE GLAUCOMA, LEFT EYE, MILD STAGE: ICD-10-CM

## 2023-01-25 DIAGNOSIS — R79.89 ELEVATED BRAIN NATRIURETIC PEPTIDE (BNP) LEVEL: ICD-10-CM

## 2023-01-25 PROBLEM — E66.811 OBESITY (BMI 30.0-34.9): Status: RESOLVED | Noted: 2018-02-20 | Resolved: 2023-01-25

## 2023-01-25 PROBLEM — E66.9 OBESITY (BMI 30.0-34.9): Status: RESOLVED | Noted: 2018-02-20 | Resolved: 2023-01-25

## 2023-01-25 PROBLEM — I16.0 HYPERTENSIVE URGENCY: Status: RESOLVED | Noted: 2022-12-17 | Resolved: 2023-01-25

## 2023-01-25 LAB
BNP SERPL-MCNC: 480 PG/ML (ref 0–99)
TROPONIN I SERPL DL<=0.01 NG/ML-MCNC: 0.04 NG/ML (ref 0–0.03)
URATE SERPL-MCNC: 5.3 MG/DL (ref 2.4–5.7)

## 2023-01-25 PROCEDURE — 99999 PR PBB SHADOW E&M-EST. PATIENT-LVL III: CPT | Mod: PBBFAC,,, | Performed by: NURSE PRACTITIONER

## 2023-01-25 PROCEDURE — 84484 ASSAY OF TROPONIN QUANT: CPT | Performed by: FAMILY MEDICINE

## 2023-01-25 PROCEDURE — 99999 PR PBB SHADOW E&M-EST. PATIENT-LVL III: ICD-10-PCS | Mod: PBBFAC,,, | Performed by: NURSE PRACTITIONER

## 2023-01-25 PROCEDURE — 99213 OFFICE O/P EST LOW 20 MIN: CPT | Mod: PBBFAC,27 | Performed by: FAMILY MEDICINE

## 2023-01-25 PROCEDURE — 84443 ASSAY THYROID STIM HORMONE: CPT | Performed by: FAMILY MEDICINE

## 2023-01-25 PROCEDURE — G0439 PPPS, SUBSEQ VISIT: HCPCS | Mod: ,,, | Performed by: NURSE PRACTITIONER

## 2023-01-25 PROCEDURE — 84550 ASSAY OF BLOOD/URIC ACID: CPT | Performed by: FAMILY MEDICINE

## 2023-01-25 PROCEDURE — 99214 PR OFFICE/OUTPT VISIT, EST, LEVL IV, 30-39 MIN: ICD-10-PCS | Mod: S$PBB,,, | Performed by: FAMILY MEDICINE

## 2023-01-25 PROCEDURE — 36415 COLL VENOUS BLD VENIPUNCTURE: CPT | Performed by: FAMILY MEDICINE

## 2023-01-25 PROCEDURE — 99999 PR PBB SHADOW E&M-EST. PATIENT-LVL III: ICD-10-PCS | Mod: PBBFAC,,, | Performed by: FAMILY MEDICINE

## 2023-01-25 PROCEDURE — 99214 OFFICE O/P EST MOD 30 MIN: CPT | Mod: S$PBB,,, | Performed by: FAMILY MEDICINE

## 2023-01-25 PROCEDURE — G0439 PR MEDICARE ANNUAL WELLNESS SUBSEQUENT VISIT: ICD-10-PCS | Mod: ,,, | Performed by: NURSE PRACTITIONER

## 2023-01-25 PROCEDURE — 99999 PR PBB SHADOW E&M-EST. PATIENT-LVL III: CPT | Mod: PBBFAC,,, | Performed by: FAMILY MEDICINE

## 2023-01-25 PROCEDURE — 83880 ASSAY OF NATRIURETIC PEPTIDE: CPT | Performed by: FAMILY MEDICINE

## 2023-01-25 PROCEDURE — 99213 OFFICE O/P EST LOW 20 MIN: CPT | Mod: PBBFAC | Performed by: NURSE PRACTITIONER

## 2023-01-25 RX ORDER — ALENDRONATE SODIUM 70 MG/1
70 TABLET ORAL
Qty: 12 TABLET | Refills: 3 | Status: SHIPPED | OUTPATIENT
Start: 2023-01-25 | End: 2023-11-07

## 2023-01-25 RX ORDER — LOSARTAN POTASSIUM AND HYDROCHLOROTHIAZIDE 12.5; 1 MG/1; MG/1
1 TABLET ORAL DAILY
Qty: 90 TABLET | Refills: 1 | Status: SHIPPED | OUTPATIENT
Start: 2023-01-25 | End: 2023-01-26

## 2023-01-25 NOTE — PATIENT INSTRUCTIONS
Counseling and Referral of Other Preventative  (Italic type indicates deductible and co-insurance are waived)    Patient Name: Henna Jorge  Today's Date: 1/25/2023    Health Maintenance       Date Due Completion Date    Pneumococcal Vaccines (Age 65+) (1 - PCV) Never done ---    Shingles Vaccine (2 of 3) 08/20/2015 6/25/2015    COVID-19 Vaccine (4 - Booster for Pfizer series) 05/31/2022 4/5/2022    Influenza Vaccine (1) Never done ---    Lipid Panel 05/26/2023 5/26/2022    TETANUS VACCINE 02/16/2027 2/16/2017 (Declined)    Override on 2/16/2017: Declined        No orders of the defined types were placed in this encounter.    The following information is provided to all patients.  This information is to help you find resources for any of the problems found today that may be affecting your health:                Living healthy guide: www.[facility].[place].[facility]      Understanding Diabetes: www.diabetes.org      Eating healthy: www.cdc.gov/healthyweight      [facility] home safety checklist: www.cdc.gov/steadi/patient.html      Agency on Aging: www.goea.[place].[facility]      Alcoholics anonymous (AA): www.aa.org      Physical Activity: www.ashish.nih.gov/vq1lfxu      Tobacco use: www.quitwithusla.org

## 2023-01-25 NOTE — PROGRESS NOTES
"  Henna Jorge presented for a  Medicare AWV and comprehensive Health Risk Assessment today. The following components were reviewed and updated:    Medical history  Family History  Social history  Allergies and Current Medications  Health Risk Assessment  Health Maintenance  Care Team         ** See Completed Assessments for Annual Wellness Visit within the encounter summary.**         The following assessments were completed:  Living Situation  CAGE  Depression Screening  Timed Get Up and Go  Whisper Test  Cognitive Function Screening  Nutrition Screening  ADL Screening  PAQ Screening    Daughter, Clare Knutson, with patient for visit    Vitals:    01/25/23 1225   BP: (!) 162/76   BP Location: Right arm   Patient Position: Sitting   Pulse: 74   Weight: 85.8 kg (189 lb 2.5 oz)   Height:    Pain scale 5' 7" (1.702 m)    Left hip 1-2/5 since recent fall a week or so ago     Body mass index is 29.63 kg/m².  Physical Exam  Constitutional:       General: She is not in acute distress.     Appearance: She is obese. She is not ill-appearing or diaphoretic.   HENT:      Head: Normocephalic and atraumatic.      Mouth/Throat:      Mouth: Mucous membranes are moist.   Eyes:      General:         Right eye: No discharge.         Left eye: No discharge.      Conjunctiva/sclera: Conjunctivae normal.   Cardiovascular:      Rate and Rhythm: Normal rate and regular rhythm.      Pulses: Normal pulses.      Heart sounds: Normal heart sounds.   Pulmonary:      Effort: Pulmonary effort is normal. No respiratory distress.   Genitourinary:     Comments: Not examined  Musculoskeletal:      Cervical back: Normal range of motion and neck supple. No rigidity or tenderness.   Skin:     General: Skin is warm and dry.   Neurological:      Mental Status: She is alert and oriented to person, place, and time. Mental status is at baseline.   Psychiatric:         Mood and Affect: Mood normal.         Behavior: Behavior normal.         Thought Content: " Thought content normal.         Judgment: Judgment normal.           Diagnoses and health risks identified today and associated recommendations/orders:    1. Encounter for preventative adult health care examination  Review for opioid screening: Patient does not have Rx for Opioids  Review for substance use disorder: Patient does not use substance per chart    Patient reports she does not drink alcohol    I offered to discuss advanced care planning, including how to pick a person who would make decisions for you if you were unable to make them for yourself, called a health care power of , and what kind of decisions you might make such as use of life sustaining treatments such as ventilators and tube feeding when faced with a life limiting illness recorded on a living will that they will need to know. (How you want to be cared for as you near the end of your natural life)     X Patient is interested in learning more about how to make advanced directives.  I provided them paperwork and offered to discuss this with them.    2. 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 Stage 3b chronic kidney disease   Monitor  Follow up with PCP    4. Memory deficit- mild  Monitor. Daughter, Clare, with patient during visit. Patient unable to complete clock draw or and only able to remember 1 word on delayed memory check  Follow  up with PCP     5. Right hip pain, S/p recent fall  Patient reports recent fall about 2 weeks where she lost her balance. Patient denied any dizziness. Reports some mild intermittent left hip pain. No change in ROM noted.  Stable. Follow up with PCP  Fall precautions    6. Chronic gout without tophus, unspecified cause, unspecified site  Monitor  Stable  Continue home allopurinol, colchicine    7. Osteoarthritis of spine with radiculopathy, lumbar region,  Primary osteoarthritis of both hips, Primary osteoarthritis of both knees  Monitor  Follow up with PCP  Fall precautions    8. Primary open-angle glaucoma, left eye, mild stage, Primary open-angle glaucoma, right eye, severe stage  Monitor  Follow up with Ophtho as directed  Continue home cospot, latanoprost    9. Osteoporosis, unspecified osteoporosis type, unspecified pathological fracture presence  Monitor  Follow up with PCP  Continue home fosamax, Vit D    10.  History of cervical cancer  Monitor  Follow up with Gyn as directed    11. Essential hypertension  Monitor  Stable  Continue home hyzaar           Provided Henna with a 5-10 year written screening schedule and personal prevention plan. Recommendations were developed using the USPSTF age appropriate recommendations. Education, counseling, and referrals were provided as needed. After Visit Summary printed and given to patient which includes a list of additional screenings\tests needed.    Follow up in about 1 year (around 1/25/2024) for Annual wellness visit.    FABIANO Green

## 2023-01-25 NOTE — PROGRESS NOTES
Subjective:       Patient ID: Henna Jorge is a 89 y.o. female.    Chief Complaint: Follow-up    89-year-old  female patient accompanied by her daughter with Patient Active Problem List:     Essential hypertension     Osteoporosis     Primary osteoarthritis of both knees     History of cervical cancer     Stage 3a chronic kidney disease     Chronic seasonal allergic rhinitis due to pollen     Osteoarthritis of spine with radiculopathy, lumbar region     Primary osteoarthritis of both hips     Primary open-angle glaucoma, right eye, severe stage     Primary open-angle glaucoma, left eye, mild stage     Elevated brain natriuretic peptide (BNP) level     Elevated troponin     Aortic atherosclerosis     Tortuous aorta cxr 12/2022     Pulmonary hypertension echo 12/2022     Chronic gout without tophus     Memory deficit- mild     Mild Right hip pain, S/p recent fall  Here for follow-up on chronic medical conditions and reported that patient recently had a fall and has been having some discomfort to the left thigh  Reports decreased appetite, has been more anxious as per the daughter but patient denies any underlying anxiety depression  Patient has been getting frequently irritable which has been elevating her blood pressures and reports that she is in lot of playing as per her daughter  Patient not interested in doing any physical therapy  Recently went to emergency room secondary to severe pain and caused elevated blood pressure and noted to have elevated BNP and troponin levels-denies any chest tightness or chest pain or difficulty breathing    Review of Systems   Constitutional:  Negative for fatigue.   Eyes:  Negative for visual disturbance.   Respiratory:  Negative for chest tightness, shortness of breath and wheezing.    Cardiovascular:  Negative for chest pain and leg swelling.   Gastrointestinal:  Negative for abdominal pain, nausea and vomiting.   Musculoskeletal:  Positive for gait problem  "and myalgias.   Skin:  Negative for rash.   Neurological:  Negative for light-headedness and headaches.   Psychiatric/Behavioral:  Negative for sleep disturbance. The patient is nervous/anxious.        BP (!) 146/70 (BP Location: Left arm, Patient Position: Sitting, BP Method: Large (Manual))   Pulse (!) 58   Resp 20   Ht 5' 7" (1.702 m)   Wt 85.7 kg (189 lb)   SpO2 98%   BMI 29.60 kg/m²   Objective:      Physical Exam  Constitutional:       Appearance: She is well-developed.   HENT:      Head: Normocephalic and atraumatic.   Cardiovascular:      Rate and Rhythm: Normal rate and regular rhythm.      Heart sounds: Normal heart sounds. No murmur heard.  Pulmonary:      Effort: Pulmonary effort is normal.      Breath sounds: Normal breath sounds. No wheezing.   Abdominal:      General: Bowel sounds are normal.      Palpations: Abdomen is soft.      Tenderness: There is no abdominal tenderness.   Musculoskeletal:         General: Tenderness present.      Comments: Minimal tenderness noted to the left thigh  Patient in wheelchair today   Skin:     General: Skin is warm and dry.      Findings: No rash.   Neurological:      Mental Status: She is alert and oriented to person, place, and time.   Psychiatric:         Mood and Affect: Mood normal.         Assessment/Plan:   1. Essential hypertension  -     TSH; Future; Expected date: 01/25/2023  -     Ambulatory referral/consult to Cardiology; Future; Expected date: 02/01/2023  -     losartan-hydrochlorothiazide 100-12.5 mg (HYZAAR) 100-12.5 mg Tab; Take 1 tablet by mouth once daily.  Dispense: 90 tablet; Refill: 1  -     Hypertension Digital Medicine (HDMP) Enrollment Order  -     Hypertension Digital Medicine (HDMP): Assign Onboarding Questionnaires  Blood pressure stable currently on losartan hydrochlorothiazide 100/12.5 mg but mildly elevated  Will have patient join hypertension digital program    2. Stage 3a chronic kidney disease  Encouraged to drink adequate " fluids and avoid over-the-counter NSAIDs    3. Osteoarthritis of spine with radiculopathy, lumbar region  4. Primary osteoarthritis of both hips  5. Primary osteoarthritis of both knees  Graded exercise regimen recommended    6. Age related osteoporosis, unspecified pathological fracture presence    Orders:  -     alendronate (FOSAMAX) 70 MG tablet; Take 1 tablet (70 mg total) by mouth every 7 days.  Dispense: 12 tablet; Refill: 3  Encouraged to take Fosamax regularly      7. Chronic seasonal allergic rhinitis due to pollen  Stable    8. History of cervical cancer    9. Aortic atherosclerosis    10. Idiopathic chronic gout of foot without tophus, unspecified laterality  -     Uric Acid; Future; Expected date: 01/25/2023  Currently taking allopurinol 300 mg daily    11. Elevated brain natriuretic peptide (BNP) level  -     B-TYPE NATRIURETIC PEPTIDE; Future; Expected date: 01/25/2023  -     Ambulatory referral/consult to Cardiology; Future; Expected date: 02/01/2023    12. Elevated troponin  -     TROPONIN I; Future; Expected date: 01/25/2023  -     Ambulatory referral/consult to Cardiology; Future; Expected date: 02/01/2023    Will recheck BNP and troponin levels as it was elevated in the past recently in December  Patient clinically asymptomatic  Will refer to Cardiology for further evaluation

## 2023-01-26 ENCOUNTER — OFFICE VISIT (OUTPATIENT)
Dept: CARDIOLOGY | Facility: CLINIC | Age: 88
End: 2023-01-26
Payer: MEDICARE

## 2023-01-26 ENCOUNTER — OFFICE VISIT (OUTPATIENT)
Dept: PODIATRY | Facility: CLINIC | Age: 88
End: 2023-01-26
Payer: MEDICARE

## 2023-01-26 ENCOUNTER — TELEPHONE (OUTPATIENT)
Dept: INTERNAL MEDICINE | Facility: CLINIC | Age: 88
End: 2023-01-26
Payer: MEDICARE

## 2023-01-26 ENCOUNTER — PATIENT MESSAGE (OUTPATIENT)
Dept: CARDIOLOGY | Facility: CLINIC | Age: 88
End: 2023-01-26

## 2023-01-26 VITALS
WEIGHT: 189 LBS | DIASTOLIC BLOOD PRESSURE: 80 MMHG | OXYGEN SATURATION: 98 % | HEIGHT: 67 IN | BODY MASS INDEX: 29.66 KG/M2 | SYSTOLIC BLOOD PRESSURE: 140 MMHG | HEART RATE: 68 BPM

## 2023-01-26 VITALS — BODY MASS INDEX: 29.66 KG/M2 | WEIGHT: 189 LBS | HEIGHT: 67 IN

## 2023-01-26 DIAGNOSIS — R60.9 EDEMA, UNSPECIFIED TYPE: ICD-10-CM

## 2023-01-26 DIAGNOSIS — Z09 HOSPITAL DISCHARGE FOLLOW-UP: Primary | ICD-10-CM

## 2023-01-26 DIAGNOSIS — R54 ADVANCED AGE: ICD-10-CM

## 2023-01-26 DIAGNOSIS — R79.89 ELEVATED TROPONIN: ICD-10-CM

## 2023-01-26 DIAGNOSIS — I10 ESSENTIAL HYPERTENSION: ICD-10-CM

## 2023-01-26 DIAGNOSIS — M79.675 PAIN DUE TO ONYCHOMYCOSIS OF TOENAILS OF BOTH FEET: Primary | ICD-10-CM

## 2023-01-26 DIAGNOSIS — I77.1 TORTUOUS AORTA: ICD-10-CM

## 2023-01-26 DIAGNOSIS — I27.20 PULMONARY HYPERTENSION: ICD-10-CM

## 2023-01-26 DIAGNOSIS — L84 CALLUS: ICD-10-CM

## 2023-01-26 DIAGNOSIS — B35.1 PAIN DUE TO ONYCHOMYCOSIS OF TOENAILS OF BOTH FEET: Primary | ICD-10-CM

## 2023-01-26 DIAGNOSIS — Z76.89 ENCOUNTER TO ESTABLISH CARE WITH NEW DOCTOR: ICD-10-CM

## 2023-01-26 DIAGNOSIS — R79.89 ELEVATED BRAIN NATRIURETIC PEPTIDE (BNP) LEVEL: ICD-10-CM

## 2023-01-26 DIAGNOSIS — R41.3 MEMORY DEFICIT: ICD-10-CM

## 2023-01-26 DIAGNOSIS — I70.0 AORTIC ATHEROSCLEROSIS: ICD-10-CM

## 2023-01-26 DIAGNOSIS — N18.31 STAGE 3A CHRONIC KIDNEY DISEASE: ICD-10-CM

## 2023-01-26 DIAGNOSIS — M81.0 AGE RELATED OSTEOPOROSIS, UNSPECIFIED PATHOLOGICAL FRACTURE PRESENCE: ICD-10-CM

## 2023-01-26 DIAGNOSIS — M79.674 PAIN DUE TO ONYCHOMYCOSIS OF TOENAILS OF BOTH FEET: Primary | ICD-10-CM

## 2023-01-26 LAB — TSH SERPL DL<=0.005 MIU/L-ACNC: 1.26 UIU/ML (ref 0.4–4)

## 2023-01-26 PROCEDURE — 11721 PR DEBRIDEMENT OF NAILS, 6 OR MORE: ICD-10-PCS | Mod: S$PBB,,, | Performed by: PODIATRIST

## 2023-01-26 PROCEDURE — 99213 OFFICE O/P EST LOW 20 MIN: CPT | Mod: PBBFAC,27 | Performed by: PODIATRIST

## 2023-01-26 PROCEDURE — 99999 PR PBB SHADOW E&M-EST. PATIENT-LVL III: ICD-10-PCS | Mod: PBBFAC,,, | Performed by: INTERNAL MEDICINE

## 2023-01-26 PROCEDURE — 99213 PR OFFICE/OUTPT VISIT, EST, LEVL III, 20-29 MIN: ICD-10-PCS | Mod: 25,S$PBB,, | Performed by: PODIATRIST

## 2023-01-26 PROCEDURE — 99215 OFFICE O/P EST HI 40 MIN: CPT | Mod: S$PBB,,, | Performed by: INTERNAL MEDICINE

## 2023-01-26 PROCEDURE — 99999 PR PBB SHADOW E&M-EST. PATIENT-LVL III: CPT | Mod: PBBFAC,,, | Performed by: INTERNAL MEDICINE

## 2023-01-26 PROCEDURE — 11721 DEBRIDE NAIL 6 OR MORE: CPT | Mod: S$PBB,,, | Performed by: PODIATRIST

## 2023-01-26 PROCEDURE — 11721 DEBRIDE NAIL 6 OR MORE: CPT | Mod: PBBFAC | Performed by: PODIATRIST

## 2023-01-26 PROCEDURE — 99213 OFFICE O/P EST LOW 20 MIN: CPT | Mod: 25,S$PBB,, | Performed by: PODIATRIST

## 2023-01-26 PROCEDURE — 99215 PR OFFICE/OUTPT VISIT, EST, LEVL V, 40-54 MIN: ICD-10-PCS | Mod: S$PBB,,, | Performed by: INTERNAL MEDICINE

## 2023-01-26 PROCEDURE — 99999 PR PBB SHADOW E&M-EST. PATIENT-LVL III: ICD-10-PCS | Mod: PBBFAC,,, | Performed by: PODIATRIST

## 2023-01-26 PROCEDURE — 99213 OFFICE O/P EST LOW 20 MIN: CPT | Mod: PBBFAC | Performed by: INTERNAL MEDICINE

## 2023-01-26 PROCEDURE — 99999 PR PBB SHADOW E&M-EST. PATIENT-LVL III: CPT | Mod: PBBFAC,,, | Performed by: PODIATRIST

## 2023-01-26 RX ORDER — LOSARTAN POTASSIUM AND HYDROCHLOROTHIAZIDE 25; 100 MG/1; MG/1
1 TABLET ORAL DAILY
Qty: 30 TABLET | Refills: 11 | Status: SHIPPED | OUTPATIENT
Start: 2023-01-26 | End: 2023-01-27 | Stop reason: SDUPTHER

## 2023-01-26 NOTE — PROGRESS NOTES
Subjective:   Patient ID:  Henna Jorge is a 89 y.o. female who presents for evaluation of No chief complaint on file.      HPI  89-year-old female.  Who presented recently to the hospital with back pain.  She was noted to have hypertensive emergency on admission was blood pressure 210/110.  Her BNP was 700.  Her chest x-ray showed pulmonary congestion.    She denies any dyspnea.  She has a little sedentary.  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    She has slide bilateral lower extremity swelling.    She had an echocardiogram done in the hospital that showed concentric hypertrophy.    Her troponin was flat and minimally elevated due to her hypertensive emergency.    No orthopnea.  No chest pain.  No palpitations.    Compliant with medications lately.    States that she is trying to do better with the salt.  Accompanied by her daughter today.      Past Medical History:   Diagnosis Date    Arthritis     Back pain     sometimes    Cancer     hx of cervical    Disorder of kidney and ureter     Glaucoma     Gout, chronic     Hypertension     Osteopenia     Osteoporosis        Past Surgical History:   Procedure Laterality Date    EYE SURGERY      glaucoma    TONSILLECTOMY      TOTAL ABDOMINAL HYSTERECTOMY         Social History     Tobacco Use    Smoking status: Former    Smokeless tobacco: Never   Substance Use Topics    Alcohol use: No    Drug use: No       Family History   Problem Relation Age of Onset    Hypertension Mother     No Known Problems Father     Melanoma Neg Hx        Review of Systems   Constitutional: Negative for fever and malaise/fatigue.   HENT:  Negative for sore throat.    Eyes:  Negative for blurred vision.   Cardiovascular:  Negative for chest pain, claudication, cyanosis, dyspnea on exertion, irregular heartbeat, leg swelling, near-syncope, orthopnea, palpitations, paroxysmal nocturnal dyspnea and syncope.   Respiratory:  Negative for cough and hemoptysis.    Hematologic/Lymphatic:  Negative for bleeding problem.   Skin:  Negative for rash.   Musculoskeletal:  Negative for falls.   Gastrointestinal:  Negative for abdominal pain.   Genitourinary: Negative.    Neurological: Negative.    Psychiatric/Behavioral:  Negative for altered mental status and substance abuse.      Current Outpatient Medications on File Prior to Visit   Medication Sig    alendronate (FOSAMAX) 70 MG tablet Take 1 tablet (70 mg total) by mouth every 7 days.    allopurinoL (ZYLOPRIM) 300 MG tablet Take 1 tablet (300 mg total) by mouth once daily.    cholecalciferol, vitamin D3, 125 mcg (5,000 unit) Tab Take 5,000 Units by mouth once daily.    colchicine (COLCRYS) 0.6 mg tablet Take 1 tablet (0.6 mg total) by mouth 2 (two) times daily as needed (gout attack). TAKE 1 TABLET BY MOUTH TWICE DAILY AS NEEDED FOR GOUT    dorzolamide-timolol 2-0.5% (COSOPT) 2-0.5 % ophthalmic solution Place 1 drop into both eyes 2 (two) times daily.    latanoprost 0.005 % ophthalmic solution Place 1 drop into both eyes nightly.    [DISCONTINUED] losartan-hydrochlorothiazide 100-12.5 mg (HYZAAR) 100-12.5 mg Tab Take 1 tablet by mouth once daily.     No current facility-administered medications on file prior to visit.       Objective:   Objective:  Wt Readings from Last 3 Encounters:   01/26/23 85.7 kg (189 lb)   01/25/23 85.7 kg (189 lb)   01/25/23 85.8 kg (189 lb 2.5 oz)     Temp Readings from Last 3 Encounters:   12/17/22 98.2 °F (36.8 °C) (Oral)   04/20/21 97.8 °F (36.6 °C) (Temporal)   11/04/19 97.9 °F (36.6 °C) (Tympanic)     BP Readings from Last 3 Encounters:   01/26/23 (!) 140/80   01/25/23 (!) 146/70   01/25/23 (!) 162/76     Pulse Readings from Last 3 Encounters:   01/26/23 68   01/25/23 (!) 58   01/25/23 74       Physical Exam  Vitals reviewed.   Constitutional:       Appearance: She is well-developed.   HENT:      Head: Normocephalic and atraumatic.   Eyes:      General: No scleral icterus.     Conjunctiva/sclera: Conjunctivae normal.    Cardiovascular:      Rate and Rhythm: Normal rate and regular rhythm.      Pulses: Intact distal pulses.      Heart sounds: Normal heart sounds. No murmur heard.  Pulmonary:      Effort: No respiratory distress.      Breath sounds: No wheezing or rales.   Chest:      Chest wall: No tenderness.   Abdominal:      General: Bowel sounds are normal. There is no distension.      Palpations: Abdomen is soft.      Tenderness: There is no guarding.   Musculoskeletal:         General: Swelling present. Normal range of motion.      Cervical back: Normal range of motion and neck supple.   Skin:     General: Skin is warm.   Neurological:      Mental Status: She is alert and oriented to person, place, and time.       Lab Results   Component Value Date    CHOL 152 05/26/2022    CHOL 170 04/20/2021    CHOL 201 (H) 09/20/2019     Lab Results   Component Value Date    HDL 59 05/26/2022    HDL 68 04/20/2021    HDL 71 09/20/2019     Lab Results   Component Value Date    LDLCALC 66.8 05/26/2022    LDLCALC 88.4 04/20/2021    LDLCALC 108.8 09/20/2019     Lab Results   Component Value Date    TRIG 131 05/26/2022    TRIG 68 04/20/2021    TRIG 106 09/20/2019     Lab Results   Component Value Date    CHOLHDL 38.8 05/26/2022    CHOLHDL 40.0 04/20/2021    CHOLHDL 35.3 09/20/2019       Chemistry        Component Value Date/Time     12/16/2022 1428    K 3.7 12/16/2022 1428     12/16/2022 1428    CO2 22 (L) 12/16/2022 1428    BUN 18 12/16/2022 1428    CREATININE 1.0 12/16/2022 1428     12/16/2022 1428        Component Value Date/Time    CALCIUM 9.3 12/16/2022 1428    ALKPHOS 63 12/16/2022 1428    AST 20 12/16/2022 1428    ALT 16 12/16/2022 1428    BILITOT 0.3 12/16/2022 1428    ESTGFRAFRICA 57.7 (A) 05/26/2022 1010    EGFRNONAA 50.1 (A) 05/26/2022 1010          Lab Results   Component Value Date    TSH 1.257 01/25/2023     No results found for: INR, PROTIME  Lab Results   Component Value Date    WBC 6.07 12/16/2022    HGB 12.1  12/16/2022    HCT 37.4 12/16/2022    MCV 84 12/16/2022     12/16/2022     BNP  @LABRCNTIP(BNP,BNPTRIAGEBLO)@  CrCl cannot be calculated (Patient's most recent lab result is older than the maximum 7 days allowed.).     Imaging:  ======  Results for orders placed during the hospital encounter of 12/16/22    Echo    Interpretation Summary  · The left ventricle is normal in size with moderate concentric hypertrophy and normal systolic function.  · The estimated ejection fraction is 60%.  · Indeterminate left ventricular diastolic function.  · Normal right ventricular size with normal right ventricular systolic function.  · Mild mitral regurgitation.  · Normal central venous pressure (3 mmHg).  · The estimated PA systolic pressure is 35 mmHg.    No results found for this or any previous visit.    No results found for this or any previous visit.    No results found for this or any previous visit.    No results found for this or any previous visit.    No valid procedures specified.    Diagnostic Results:  ECG: Reviewed    The ASCVD Risk score (Nghia DK, et al., 2019) failed to calculate for the following reasons:    The 2019 ASCVD risk score is only valid for ages 40 to 79    Assessment and Plan:   Hospital discharge follow-up    Essential hypertension  -     Ambulatory referral/consult to Cardiology  -     losartan-hydrochlorothiazide 100-25 mg (HYZAAR) 100-25 mg per tablet; Take 1 tablet by mouth once daily.  Dispense: 30 tablet; Refill: 11    Elevated brain natriuretic peptide (BNP) level  -     Ambulatory referral/consult to Cardiology    Elevated troponin  -     Ambulatory referral/consult to Cardiology    Encounter to establish care with new doctor    Memory deficit- mild    Aortic atherosclerosis    Tortuous aorta cxr 12/2022    Stage 3a chronic kidney disease    Pulmonary hypertension echo 12/2022    Advanced age    Age related osteoporosis, unspecified pathological fracture presence    Edema, unspecified  type      First blood pressure was 160/80.  Repeat was 140/80.    Will change losartan HCTZ to 100-25 mg.    Continue to watch salt intake.    Reviewed all tests and above medical conditions with patient in detail and formulated treatment plan.  Risk factor modification discussed.   Cardiac low salt diet discussed.  Maintaining healthy weight and weight loss goals were discussed in clinic.    Follow up in 6 months

## 2023-01-26 NOTE — TELEPHONE ENCOUNTER
----- Message from Mary Corbett MD sent at 1/26/2023  1:55 PM CST -----  Labs continue to show mildly elevated BNP ( fluid levels ) and troponin- heart enzymes - please follow up with cardiology   If any worsening chest pain or shortness of breath, go to ER   Normal uric acid levels

## 2023-01-26 NOTE — TELEPHONE ENCOUNTER
S/W pt's daughter Clare and informed her of lab results and MD recommendations. Pt's daughter stated Cardiologist was seen today and her increased dosage on medication.

## 2023-01-26 NOTE — PROGRESS NOTES
PODIATRIC MEDICINE AND SURGERY   Mary Corbett MD    CHIEF COMPLAINT  Chief Complaint   Patient presents with    Ingrown Toenail     C/o ingrown toenail, right great toenail, medial border, rates pain 7/10, pt also would like her toenails clipped, non-diabetic, wears tennis and socks, last seen PCP Dr. Corbett on 01/25/2023         HPI    SUBJECTIVE: Henna Jorge is a 89 y.o. female who  has a past medical history of Arthritis, Back pain, Cancer, Disorder of kidney and ureter, Glaucoma, Gout, chronic, Hypertension, Osteopenia, and Osteoporosis. Henna presenting to podiatry clinic with complaint of thickened, discolored, and painful toenails. Pt states nails result in pain with enclosed shoe wear aggravated due to pressure  and/or with ambulation. They are unable to trim nails. They are requesting nail care for relief of painful symptoms. Patient has no further pedal complaints.      PMH  Past Medical History:   Diagnosis Date    Arthritis     Back pain     sometimes    Cancer     hx of cervical    Disorder of kidney and ureter     Glaucoma     Gout, chronic     Hypertension     Osteopenia     Osteoporosis      Patient Active Problem List   Diagnosis    Essential hypertension    Osteoporosis    Primary osteoarthritis of both knees    History of cervical cancer    Stage 3a chronic kidney disease    Chronic seasonal allergic rhinitis due to pollen    Osteoarthritis of spine with radiculopathy, lumbar region    Primary osteoarthritis of both hips    Primary open-angle glaucoma, right eye, severe stage    Primary open-angle glaucoma, left eye, mild stage    Elevated brain natriuretic peptide (BNP) level    Elevated troponin    Aortic atherosclerosis    Tortuous aorta cxr 12/2022    Pulmonary hypertension echo 12/2022    Chronic gout without tophus    Memory deficit- mild    Mild Right hip pain, S/p recent fall       MEDS  Current Outpatient Medications on File Prior to Visit   Medication Sig Dispense Refill     "alendronate (FOSAMAX) 70 MG tablet Take 1 tablet (70 mg total) by mouth every 7 days. 12 tablet 3    allopurinoL (ZYLOPRIM) 300 MG tablet Take 1 tablet (300 mg total) by mouth once daily. 90 tablet 1    cholecalciferol, vitamin D3, 125 mcg (5,000 unit) Tab Take 5,000 Units by mouth once daily.      colchicine (COLCRYS) 0.6 mg tablet Take 1 tablet (0.6 mg total) by mouth 2 (two) times daily as needed (gout attack). TAKE 1 TABLET BY MOUTH TWICE DAILY AS NEEDED FOR GOUT 30 tablet 2    dorzolamide-timolol 2-0.5% (COSOPT) 2-0.5 % ophthalmic solution Place 1 drop into both eyes 2 (two) times daily.      latanoprost 0.005 % ophthalmic solution Place 1 drop into both eyes nightly.      [DISCONTINUED] losartan-hydrochlorothiazide 100-12.5 mg (HYZAAR) 100-12.5 mg Tab Take 1 tablet by mouth once daily. 90 tablet 1     No current facility-administered medications on file prior to visit.       PSH     Past Surgical History:   Procedure Laterality Date    EYE SURGERY      glaucoma    TONSILLECTOMY      TOTAL ABDOMINAL HYSTERECTOMY          ALL  Review of patient's allergies indicates:  No Known Allergies    SOC     Social History     Tobacco Use    Smoking status: Former    Smokeless tobacco: Never   Substance Use Topics    Alcohol use: No    Drug use: No         Family HX      Family History   Problem Relation Age of Onset    Hypertension Mother     No Known Problems Father     Melanoma Neg Hx             REVIEW OF SYSTEMS  General: Denies any fever or chills  Chest: Denies shortness of breath, wheezing, coughing, or sputum production  Heart: Denies chest pain.  As noted above and per history of current illness above, otherwise negative in the remainder of the 14 systems.     PHYSICAL EXAM  Vitals:    01/26/23 1509   Weight: 85.7 kg (189 lb)   Height: 5' 7" (1.702 m)   PainSc: 0-No pain       GEN:  This patient is well-developed, well-nourished and appears stated age, well-oriented to person, place and time, and cooperative and " pleasant on today's visit.      LOWER EXTREMITY    VASCULAR  DP pedal pulse 2/4 RIGHT, LEFT2/4   PT pedal pulse 2/4 RIGHT, LEFT2/4  Capillary refill time immediate to the toes.   Feet are warm to the touch. Skin temperature warm to warm from proximally to distally   There are no varicosities, telangiectasias noted to bilateral foot and ankle regions.   There are no ecchymoses noted to bilateral foot and ankle regions.   There is no gross lower extremity edema.    DERMATOLOGIC  Skin moist with healthy texture and turgor.  Thickened, dystrophic, elongated, discolored toenails with subungal debris 1,2, 3, 4, 5 RIGHT FOOT, 1, 2, 3, 4 , 5 LEFT FOOT   There are no open ulcerations, lacerations, or fissures to bilateral foot and ankle regions. There are no signs of infection as there is no erythema, no proximal-extending lymphangiitis, no fluctuance, or crepitus noted on palpation to bilateral foot and ankle regions.   There is no interdigital maceration.   There are hyperkeratotic lesions noted to feet.    NEUROLOGIC  Neurological sensation is grossly intact to all sites tested    ORTHOPEDIC/BIOMECHANICAL  No symptomatic structural abnormalities noted. Muscle strength is 5/5 for foot inverters, everters, plantarflexors, and dorsiflexors. Muscle tone is normal.   Inspection/palpation of bone, joints and muscles unremarkable.    ASSESSMENT  Pain due to onychomycosis of toenails of both feet    Callus    Stage 3a chronic kidney disease          PLAN  -The patient was examined and evaulated. Patient was given verbal instructions on maintenance care for mycotic nails. The need for proper skin care in order to prevent infection and colonization in the nails was explained to the patient.    - The nails are painful with applied pressure and shoe gear. If a mycotic infection is left untreated, the infection could spread, causing marked limitation of ambulation, and/or make the patient prone to secondary infection. Failure to  debride the nails at necessary intervals could likely cause recurrence of pain.   -I recommend Vicks vapor topically once daily to affected toenails. This is a natural antifungal regimen that will help in reduction of fungal load and will also soften the nail to allow for easier debridements.   -With patient's permission, the elongated onychomycotic toenails, as outlined in the physical examination, were sharply debrided with a double action nail nipper to their soft tissue attachment. If indicated, the nails were then smoothed down in thickness with an nuvia board to facilitate in further debridement removing all offending nail and subungual debris. Patient relates relief following the procedure.       Disclaimer: This note was partially prepared using a voice recognition system and is likely to have sound alike errors within the text.        Future Appointments   Date Time Provider Department Center   7/26/2023  8:20 AM Mary Corbett MD [facility] [place]   8/24/2023 10:20 AM Kat Hernandez MD [facility] [place]       Report Electronically Signed By:     Marika Bean DPM   Podiatry  [facility]  1/26/2023

## 2023-01-27 RX ORDER — LOSARTAN POTASSIUM AND HYDROCHLOROTHIAZIDE 25; 100 MG/1; MG/1
1 TABLET ORAL DAILY
Qty: 30 TABLET | Refills: 11 | Status: SHIPPED | OUTPATIENT
Start: 2023-01-27 | End: 2024-03-11

## 2023-05-30 ENCOUNTER — OFFICE VISIT (OUTPATIENT)
Dept: PODIATRY | Facility: CLINIC | Age: 88
End: 2023-05-30
Payer: MEDICARE

## 2023-05-30 VITALS — WEIGHT: 189 LBS | BODY MASS INDEX: 29.66 KG/M2 | HEIGHT: 67 IN

## 2023-05-30 DIAGNOSIS — B35.1 ONYCHOMYCOSIS: ICD-10-CM

## 2023-05-30 DIAGNOSIS — L60.2 NAIL DISORDER (ONYCHOGRYPHOSIS): ICD-10-CM

## 2023-05-30 DIAGNOSIS — B35.1 PAIN DUE TO ONYCHOMYCOSIS OF TOENAILS OF BOTH FEET: Primary | ICD-10-CM

## 2023-05-30 DIAGNOSIS — M79.674 PAIN DUE TO ONYCHOMYCOSIS OF TOENAILS OF BOTH FEET: Primary | ICD-10-CM

## 2023-05-30 DIAGNOSIS — N18.31 STAGE 3A CHRONIC KIDNEY DISEASE: ICD-10-CM

## 2023-05-30 DIAGNOSIS — M79.675 PAIN DUE TO ONYCHOMYCOSIS OF TOENAILS OF BOTH FEET: Primary | ICD-10-CM

## 2023-05-30 PROCEDURE — 99999 PR PBB SHADOW E&M-EST. PATIENT-LVL III: ICD-10-PCS | Mod: PBBFAC,,, | Performed by: PODIATRIST

## 2023-05-30 PROCEDURE — 99999 PR PBB SHADOW E&M-EST. PATIENT-LVL III: CPT | Mod: PBBFAC,,, | Performed by: PODIATRIST

## 2023-05-30 PROCEDURE — 99213 OFFICE O/P EST LOW 20 MIN: CPT | Mod: PBBFAC | Performed by: PODIATRIST

## 2023-05-30 PROCEDURE — 11721 PR DEBRIDEMENT OF NAILS, 6 OR MORE: ICD-10-PCS | Mod: S$PBB,,, | Performed by: PODIATRIST

## 2023-05-30 PROCEDURE — 99213 PR OFFICE/OUTPT VISIT, EST, LEVL III, 20-29 MIN: ICD-10-PCS | Mod: 25,S$PBB,, | Performed by: PODIATRIST

## 2023-05-30 PROCEDURE — 11721 DEBRIDE NAIL 6 OR MORE: CPT | Mod: PBBFAC | Performed by: PODIATRIST

## 2023-05-30 PROCEDURE — 99213 OFFICE O/P EST LOW 20 MIN: CPT | Mod: 25,S$PBB,, | Performed by: PODIATRIST

## 2023-05-30 PROCEDURE — 11721 DEBRIDE NAIL 6 OR MORE: CPT | Mod: S$PBB,,, | Performed by: PODIATRIST

## 2023-05-30 NOTE — PROGRESS NOTES
PODIATRIC MEDICINE AND SURGERY   Mary Corbett MD    CHIEF COMPLAINT  Chief Complaint   Patient presents with    Nail Care     C/o thick, fungal toenails, pt would like toenails clipped, non-diabetic, wears tennis and socks, last seen PCP Dr. Corbett on 01/25/23         HPI    SUBJECTIVE: Henna Jorge is a 89 y.o. female who  has a past medical history of Arthritis, Back pain, Cancer, Disorder of kidney and ureter, Glaucoma, Gout, chronic, Hypertension, Osteopenia, and Osteoporosis. Henna presenting to podiatry clinic with complaint of thickened, discolored, and painful toenails. Pt states nails result in pain with enclosed shoe wear aggravated due to pressure  and/or with ambulation. They are unable to trim nails. They are requesting nail care for relief of painful symptoms. Patient has no further pedal complaints.      PMH  Past Medical History:   Diagnosis Date    Arthritis     Back pain     sometimes    Cancer     hx of cervical    Disorder of kidney and ureter     Glaucoma     Gout, chronic     Hypertension     Osteopenia     Osteoporosis      Patient Active Problem List   Diagnosis    Essential hypertension    Osteoporosis    Primary osteoarthritis of both knees    History of cervical cancer    Stage 3a chronic kidney disease    Chronic seasonal allergic rhinitis due to pollen    Osteoarthritis of spine with radiculopathy, lumbar region    Primary osteoarthritis of both hips    Primary open-angle glaucoma, right eye, severe stage    Primary open-angle glaucoma, left eye, mild stage    Elevated brain natriuretic peptide (BNP) level    Elevated troponin    Aortic atherosclerosis    Tortuous aorta cxr 12/2022    Pulmonary hypertension echo 12/2022    Chronic gout without tophus    Memory deficit- mild    Mild Right hip pain, S/p recent fall       MEDS  Current Outpatient Medications on File Prior to Visit   Medication Sig Dispense Refill    alendronate (FOSAMAX) 70 MG tablet Take 1 tablet (70 mg total) by  "mouth every 7 days. 12 tablet 3    allopurinoL (ZYLOPRIM) 300 MG tablet Take 1 tablet (300 mg total) by mouth once daily. 90 tablet 1    cholecalciferol, vitamin D3, 125 mcg (5,000 unit) Tab Take 5,000 Units by mouth once daily.      colchicine (COLCRYS) 0.6 mg tablet Take 1 tablet (0.6 mg total) by mouth 2 (two) times daily as needed (gout attack). TAKE 1 TABLET BY MOUTH TWICE DAILY AS NEEDED FOR GOUT 30 tablet 2    dorzolamide-timolol 2-0.5% (COSOPT) 2-0.5 % ophthalmic solution Place 1 drop into both eyes 2 (two) times daily.      latanoprost 0.005 % ophthalmic solution Place 1 drop into both eyes nightly.      losartan-hydrochlorothiazide 100-25 mg (HYZAAR) 100-25 mg per tablet Take 1 tablet by mouth once daily. 30 tablet 11     No current facility-administered medications on file prior to visit.       PSH     Past Surgical History:   Procedure Laterality Date    EYE SURGERY      glaucoma    TONSILLECTOMY      TOTAL ABDOMINAL HYSTERECTOMY          ALL  Review of patient's allergies indicates:  No Known Allergies    SOC     Social History     Tobacco Use    Smoking status: Former    Smokeless tobacco: Never   Substance Use Topics    Alcohol use: No    Drug use: No         Family HX      Family History   Problem Relation Age of Onset    Hypertension Mother     No Known Problems Father     Melanoma Neg Hx             REVIEW OF SYSTEMS  General: Denies any fever or chills  Chest: Denies shortness of breath, wheezing, coughing, or sputum production  Heart: Denies chest pain.  As noted above and per history of current illness above, otherwise negative in the remainder of the 14 systems.     PHYSICAL EXAM  Vitals:    05/30/23 1415   Weight: 85.7 kg (189 lb)   Height: 5' 7" (1.702 m)   PainSc: 0-No pain       GEN:  This patient is well-developed, well-nourished and appears stated age, well-oriented to person, place and time, and cooperative and pleasant on today's visit.      LOWER EXTREMITY    VASCULAR  DP pedal pulse " 2/4 RIGHT, LEFT2/4   PT pedal pulse 2/4 RIGHT, LEFT2/4  Capillary refill time immediate to the toes.   Feet are warm to the touch. Skin temperature warm to warm from proximally to distally   There are no varicosities, telangiectasias noted to bilateral foot and ankle regions.   There are no ecchymoses noted to bilateral foot and ankle regions.   There is no gross lower extremity edema.    DERMATOLOGIC  Skin moist with healthy texture and turgor.  Thickened, dystrophic, elongated, discolored toenails with subungal debris 1,2, 3, 4, 5 RIGHT FOOT, 1, 2, 3, 4 , 5 LEFT FOOT   There are no open ulcerations, lacerations, or fissures to bilateral foot and ankle regions. There are no signs of infection as there is no erythema, no proximal-extending lymphangiitis, no fluctuance, or crepitus noted on palpation to bilateral foot and ankle regions.   There is no interdigital maceration.   There are hyperkeratotic lesions noted to feet.    NEUROLOGIC  Neurological sensation is grossly intact to all sites tested    ORTHOPEDIC/BIOMECHANICAL  No symptomatic structural abnormalities noted. Muscle strength is 5/5 for foot inverters, everters, plantarflexors, and dorsiflexors. Muscle tone is normal.   Inspection/palpation of bone, joints and muscles unremarkable.    ASSESSMENT  Pain due to onychomycosis of toenails of both feet    Stage 3a chronic kidney disease    Nail disorder (onychogryphosis)    Onychomycosis            PLAN  -The patient was examined and evaulated. Patient was given verbal instructions on maintenance care for mycotic nails. The need for proper skin care in order to prevent infection and colonization in the nails was explained to the patient.    - The nails are painful with applied pressure and shoe gear. If a mycotic infection is left untreated, the infection could spread, causing marked limitation of ambulation, and/or make the patient prone to secondary infection. Failure to debride the nails at necessary  intervals could likely cause recurrence of pain.   -I recommend Vicks vapor topically once daily to affected toenails. This is a natural antifungal regimen that will help in reduction of fungal load and will also soften the nail to allow for easier debridements.   -With patient's permission, the elongated onychomycotic toenails, as outlined in the physical examination, were sharply debrided with a double action nail nipper to their soft tissue attachment. If indicated, the nails were then smoothed down in thickness with an nuvia board to facilitate in further debridement removing all offending nail and subungual debris. Patient relates relief following the procedure.       Disclaimer: This note was partially prepared using a voice recognition system and is likely to have sound alike errors within the text.        Future Appointments   Date Time Provider Department Center   7/26/2023  8:20 AM Mary Corbett MD [facility] [place]   8/24/2023 10:20 AM Kat Hernandez MD [facility] [place]       Report Electronically Signed By:     Marika Bean DPM   Podiatry  [facility]-   5/30/2023

## 2023-06-08 ENCOUNTER — PATIENT MESSAGE (OUTPATIENT)
Dept: INTERNAL MEDICINE | Facility: CLINIC | Age: 88
End: 2023-06-08
Payer: MEDICARE

## 2023-06-08 ENCOUNTER — TELEPHONE (OUTPATIENT)
Dept: INTERNAL MEDICINE | Facility: CLINIC | Age: 88
End: 2023-06-08
Payer: MEDICARE

## 2023-08-07 ENCOUNTER — PATIENT MESSAGE (OUTPATIENT)
Dept: INTERNAL MEDICINE | Facility: CLINIC | Age: 88
End: 2023-08-07

## 2023-08-07 ENCOUNTER — OFFICE VISIT (OUTPATIENT)
Dept: INTERNAL MEDICINE | Facility: CLINIC | Age: 88
End: 2023-08-07
Payer: MEDICARE

## 2023-08-07 ENCOUNTER — HOSPITAL ENCOUNTER (OUTPATIENT)
Dept: RADIOLOGY | Facility: HOSPITAL | Age: 88
Discharge: HOME | End: 2023-08-07
Attending: INTERNAL MEDICINE
Payer: MEDICARE

## 2023-08-07 VITALS
RESPIRATION RATE: 20 BRPM | DIASTOLIC BLOOD PRESSURE: 70 MMHG | SYSTOLIC BLOOD PRESSURE: 140 MMHG | OXYGEN SATURATION: 98 % | TEMPERATURE: 99 F | HEART RATE: 76 BPM

## 2023-08-07 DIAGNOSIS — M25.531 RIGHT WRIST PAIN: ICD-10-CM

## 2023-08-07 DIAGNOSIS — M25.531 RIGHT WRIST PAIN: Primary | ICD-10-CM

## 2023-08-07 PROCEDURE — 73110 XR WRIST COMPLETE 3 VIEWS RIGHT: ICD-10-PCS | Mod: 26,RT,, | Performed by: RADIOLOGY

## 2023-08-07 PROCEDURE — 99999 PR PBB SHADOW E&M-EST. PATIENT-LVL III: CPT | Mod: PBBFAC,,, | Performed by: INTERNAL MEDICINE

## 2023-08-07 PROCEDURE — 73110 X-RAY EXAM OF WRIST: CPT | Mod: TC,RT

## 2023-08-07 PROCEDURE — 73110 X-RAY EXAM OF WRIST: CPT | Mod: 26,RT,, | Performed by: RADIOLOGY

## 2023-08-07 PROCEDURE — 99999 PR PBB SHADOW E&M-EST. PATIENT-LVL III: ICD-10-PCS | Mod: PBBFAC,,, | Performed by: INTERNAL MEDICINE

## 2023-08-07 PROCEDURE — 99213 PR OFFICE/OUTPT VISIT, EST, LEVL III, 20-29 MIN: ICD-10-PCS | Mod: S$PBB,,, | Performed by: INTERNAL MEDICINE

## 2023-08-07 PROCEDURE — 99213 OFFICE O/P EST LOW 20 MIN: CPT | Mod: PBBFAC,PO | Performed by: INTERNAL MEDICINE

## 2023-08-07 PROCEDURE — 99213 OFFICE O/P EST LOW 20 MIN: CPT | Mod: S$PBB,,, | Performed by: INTERNAL MEDICINE

## 2023-08-07 NOTE — PROGRESS NOTES
HPI:  Patient is a 89-year-old female who comes today with complaints of a fall that happened 3 days ago.  She lacerated the medial part of the left forearm and she also is very tender her right wrist.  She did not go see anybody.  Her daughter put some butterfly bandages on the laceration after cleaning it.      Current meds have been verified and updated per the EMR  Exam:BP (!) 140/70 (BP Location: Left arm, Patient Position: Sitting, BP Method: Large (Manual))   Pulse 76   Temp 99.2 °F (37.3 °C) (Tympanic)   Resp 20   SpO2 98%   The laceration on the medial aspect of the left forearm actually looks very good.  There is no significant swelling.  There is mild erythema consistent with just granulation tissue.  There is no warmth.  There is no discharge.  Her right wrist is extremely tender.  She will let me hardly move it at all.  There is no gross obvious deformity.    Lab Results   Component Value Date    WBC 6.07 12/16/2022    HGB 12.1 12/16/2022    HCT 37.4 12/16/2022     12/16/2022    CHOL 152 05/26/2022    TRIG 131 05/26/2022    HDL 59 05/26/2022    ALT 16 12/16/2022    AST 20 12/16/2022     12/16/2022    K 3.7 12/16/2022     12/16/2022    CREATININE 1.0 12/16/2022    BUN 18 12/16/2022    CO2 22 (L) 12/16/2022    TSH 1.257 01/25/2023       Impression:  Laceration of the left medial forearm, healing nicely  Right wrist pain, suspect she has a fracture  Patient Active Problem List   Diagnosis    Essential hypertension    Osteoporosis    Primary osteoarthritis of both knees    History of cervical cancer    Stage 3a chronic kidney disease    Chronic seasonal allergic rhinitis due to pollen    Osteoarthritis of spine with radiculopathy, lumbar region    Primary osteoarthritis of both hips    Primary open-angle glaucoma, right eye, severe stage    Primary open-angle glaucoma, left eye, mild stage    Elevated brain natriuretic peptide (BNP) level    Elevated troponin    Aortic atherosclerosis     Tortuous aorta cxr 12/2022    Pulmonary hypertension echo 12/2022    Chronic gout without tophus    Memory deficit- mild    Mild Right hip pain, S/p recent fall       Plan:  Orders Placed This Encounter    X-Ray Wrist Complete 3 views Right     She will get x-rays done the wrist today.  In the meantime she will use a wrist splint to keep the wrist immobilized.  We will call her with results of the x-ray    This note is generated with speech recognition software and is subject to transcription error and sound alike phrases that may be missed by proofreading.

## 2023-08-07 NOTE — TELEPHONE ENCOUNTER
Call pt/daughter and tell them no fracture seen on the xray. She should wear the wrist splint all day/night for about a week and if not improved she needs to f/u with her PCP

## 2023-08-08 NOTE — TELEPHONE ENCOUNTER
Spoke with pt's daughter, let her know xray results as stated by . Daughter verbalized understanding.

## 2023-11-07 DIAGNOSIS — M81.0 AGE-RELATED OSTEOPOROSIS WITHOUT CURRENT PATHOLOGICAL FRACTURE: ICD-10-CM

## 2023-11-07 RX ORDER — ALENDRONATE SODIUM 70 MG/1
70 TABLET ORAL
Qty: 12 TABLET | Refills: 0 | Status: SHIPPED | OUTPATIENT
Start: 2023-11-07

## 2023-11-07 NOTE — TELEPHONE ENCOUNTER
Care Due:                  Date            Visit Type   Department     Provider  --------------------------------------------------------------------------------                                EP -                              PRIMARY      [facility] INTERNAL  Mary Mainampati  Last Visit: 01-      [facility] ([facility])   MEDICINE       Aric  Next Visit: None Scheduled  None         None Found                                                            Last  Test          Frequency    Reason                     Performed    Due Date  --------------------------------------------------------------------------------    Office Visit  12 months..  alendronate..............  01- 01-    CBC.........  12 months..  allopurinoL..............  12- 12-    CMP.........  12 months..  alendronate, allopurinoL,   12- 12-                             colchicine...............    Mg Level....  12 months..  alendronate..............  Not Found    Overdue    Phosphate...  12 months..  alendronate..............  Not Found    Overdue    Uric Acid...  12 months..  allopurinoL, colchicine..  01- 01-    Vitamin D...  12 months..  alendronate..............  Not Found    Overdue    [employer] Embedded Care Due Messages. Reference number: [id number].   11/07/2023 8:07:55 AM CST

## 2023-11-29 ENCOUNTER — OFFICE VISIT (OUTPATIENT)
Dept: PODIATRY | Facility: CLINIC | Age: 88
End: 2023-11-29
Payer: MEDICARE

## 2023-11-29 VITALS — HEIGHT: 67 IN | WEIGHT: 189 LBS | BODY MASS INDEX: 29.66 KG/M2

## 2023-11-29 DIAGNOSIS — B35.1 PAIN DUE TO ONYCHOMYCOSIS OF TOENAILS OF BOTH FEET: Primary | ICD-10-CM

## 2023-11-29 DIAGNOSIS — M79.674 PAIN DUE TO ONYCHOMYCOSIS OF TOENAILS OF BOTH FEET: Primary | ICD-10-CM

## 2023-11-29 DIAGNOSIS — M79.675 PAIN DUE TO ONYCHOMYCOSIS OF TOENAILS OF BOTH FEET: Primary | ICD-10-CM

## 2023-11-29 DIAGNOSIS — B35.1 ONYCHOMYCOSIS: ICD-10-CM

## 2023-11-29 DIAGNOSIS — L60.2 NAIL DISORDER (ONYCHOGRYPHOSIS): ICD-10-CM

## 2023-11-29 PROCEDURE — 99213 OFFICE O/P EST LOW 20 MIN: CPT | Mod: 25,S$PBB,, | Performed by: PODIATRIST

## 2023-11-29 PROCEDURE — 99999 PR PBB SHADOW E&M-EST. PATIENT-LVL III: CPT | Mod: PBBFAC,,, | Performed by: PODIATRIST

## 2023-11-29 PROCEDURE — 99213 OFFICE O/P EST LOW 20 MIN: CPT | Mod: PBBFAC,25 | Performed by: PODIATRIST

## 2023-11-29 PROCEDURE — 11721 DEBRIDE NAIL 6 OR MORE: CPT | Mod: S$PBB,,, | Performed by: PODIATRIST

## 2023-11-29 PROCEDURE — 99999 PR PBB SHADOW E&M-EST. PATIENT-LVL III: ICD-10-PCS | Mod: PBBFAC,,, | Performed by: PODIATRIST

## 2023-11-29 PROCEDURE — 11721 DEBRIDE NAIL 6 OR MORE: CPT | Mod: PBBFAC | Performed by: PODIATRIST

## 2023-12-01 NOTE — PROGRESS NOTES
PODIATRIC MEDICINE AND SURGERY   Mary Corbett MD    CHIEF COMPLAINT  Chief Complaint   Patient presents with    Nail Care     Nail care, non-diabetic, wears tennis and socks, last seen PCP Dr. Corbett on 01/25/2023         HPI    SUBJECTIVE: Henna Jorge is a 89 y.o. female who  has a past medical history of Arthritis, Back pain, Cancer, Disorder of kidney and ureter, Glaucoma, Gout, chronic, Hypertension, Osteopenia, and Osteoporosis. Henna presenting to podiatry clinic with complaint of thickened, discolored, and painful toenails. Pt states nails result in pain with enclosed shoe wear aggravated due to pressure  and/or with ambulation. They are unable to trim nails. They are requesting nail care for relief of painful symptoms. Patient has no further pedal complaints.      PMH  Past Medical History:   Diagnosis Date    Arthritis     Back pain     sometimes    Cancer     hx of cervical    Disorder of kidney and ureter     Glaucoma     Gout, chronic     Hypertension     Osteopenia     Osteoporosis      Patient Active Problem List   Diagnosis    Essential hypertension    Osteoporosis    Primary osteoarthritis of both knees    History of cervical cancer    Stage 3a chronic kidney disease    Chronic seasonal allergic rhinitis due to pollen    Osteoarthritis of spine with radiculopathy, lumbar region    Primary osteoarthritis of both hips    Primary open-angle glaucoma, right eye, severe stage    Primary open-angle glaucoma, left eye, mild stage    Elevated brain natriuretic peptide (BNP) level    Elevated troponin    Aortic atherosclerosis    Tortuous aorta cxr 12/2022    Pulmonary hypertension echo 12/2022    Chronic gout without tophus    Memory deficit- mild    Mild Right hip pain, S/p recent fall       MEDS  Current Outpatient Medications on File Prior to Visit   Medication Sig Dispense Refill    alendronate (FOSAMAX) 70 MG tablet TAKE 1 TABLET(70 MG) BY MOUTH EVERY 7 DAYS 12 tablet 0    allopurinoL  "(ZYLOPRIM) 300 MG tablet Take 1 tablet (300 mg total) by mouth once daily. 90 tablet 1    cholecalciferol, vitamin D3, 125 mcg (5,000 unit) Tab Take 5,000 Units by mouth once daily.      colchicine (COLCRYS) 0.6 mg tablet Take 1 tablet (0.6 mg total) by mouth 2 (two) times daily as needed (gout attack). TAKE 1 TABLET BY MOUTH TWICE DAILY AS NEEDED FOR GOUT 30 tablet 2    dorzolamide-timolol 2-0.5% (COSOPT) 2-0.5 % ophthalmic solution Place 1 drop into both eyes 2 (two) times daily.      latanoprost 0.005 % ophthalmic solution Place 1 drop into both eyes nightly.      losartan-hydrochlorothiazide 100-25 mg (HYZAAR) 100-25 mg per tablet Take 1 tablet by mouth once daily. 30 tablet 11     No current facility-administered medications on file prior to visit.       PSH     Past Surgical History:   Procedure Laterality Date    EYE SURGERY      glaucoma    TONSILLECTOMY      TOTAL ABDOMINAL HYSTERECTOMY          ALL  Review of patient's allergies indicates:  No Known Allergies    SOC     Social History     Tobacco Use    Smoking status: Former    Smokeless tobacco: Never   Substance Use Topics    Alcohol use: No    Drug use: No         Family HX      Family History   Problem Relation Age of Onset    Hypertension Mother     No Known Problems Father     Melanoma Neg Hx             REVIEW OF SYSTEMS  General: Denies any fever or chills  Chest: Denies shortness of breath, wheezing, coughing, or sputum production  Heart: Denies chest pain.  As noted above and per history of current illness above, otherwise negative in the remainder of the 14 systems.     PHYSICAL EXAM  Vitals:    11/29/23 1140   Weight: 85.7 kg (189 lb)   Height: 5' 7" (1.702 m)   PainSc: 0-No pain       GEN:  This patient is well-developed, well-nourished and appears stated age, well-oriented to person, place and time, and cooperative and pleasant on today's visit.      LOWER EXTREMITY    VASCULAR  DP pedal pulse 2/4 RIGHT, LEFT2/4   PT pedal pulse 2/4 RIGHT, " LEFT2/4  Capillary refill time immediate to the toes.   Feet are warm to the touch. Skin temperature warm to warm from proximally to distally   There are no varicosities, telangiectasias noted to bilateral foot and ankle regions.   There are no ecchymoses noted to bilateral foot and ankle regions.   There is no gross lower extremity edema.    DERMATOLOGIC  Skin moist with healthy texture and turgor.  Thickened, dystrophic, elongated, discolored toenails with subungal debris 1,2, 3, 4, 5 RIGHT FOOT, 1, 2, 3, 4 , 5 LEFT FOOT   There are no open ulcerations, lacerations, or fissures to bilateral foot and ankle regions. There are no signs of infection as there is no erythema, no proximal-extending lymphangiitis, no fluctuance, or crepitus noted on palpation to bilateral foot and ankle regions.   There is no interdigital maceration.   There are hyperkeratotic lesions noted to feet.    NEUROLOGIC  Neurological sensation is grossly intact to all sites tested    ORTHOPEDIC/BIOMECHANICAL  No symptomatic structural abnormalities noted. Muscle strength is 5/5 for foot inverters, everters, plantarflexors, and dorsiflexors. Muscle tone is normal.   Inspection/palpation of bone, joints and muscles unremarkable.    ASSESSMENT  There are no diagnoses linked to this encounter.          PLAN  -The patient was examined and evaulated. Patient was given verbal instructions on maintenance care for mycotic nails. The need for proper skin care in order to prevent infection and colonization in the nails was explained to the patient.    - The nails are painful with applied pressure and shoe gear. If a mycotic infection is left untreated, the infection could spread, causing marked limitation of ambulation, and/or make the patient prone to secondary infection. Failure to debride the nails at necessary intervals could likely cause recurrence of pain.   -I recommend Vicks vapor topically once daily to affected toenails. This is a natural  antifungal regimen that will help in reduction of fungal load and will also soften the nail to allow for easier debridements.   -With patient's permission, the elongated onychomycotic toenails, as outlined in the physical examination, were sharply debrided with a double action nail nipper to their soft tissue attachment. If indicated, the nails were then smoothed down in thickness with an nuvia board to facilitate in further debridement removing all offending nail and subungual debris. Patient relates relief following the procedure.       Disclaimer: This note was partially prepared using a voice recognition system and is likely to have sound alike errors within the text.        Future Appointments   Date Time Provider Department Center   2/29/2024 11:30 AM Marika Bean DPM [facility] [place]       Report Electronically Signed By:     Marika Bean DPM   Podiatry  [facility]-   12/1/2023

## 2024-01-11 ENCOUNTER — PATIENT MESSAGE (OUTPATIENT)
Dept: ADMINISTRATIVE | Facility: CLINIC | Age: 89
End: 2024-01-11
Payer: MEDICARE

## 2024-03-06 PROBLEM — R79.89 LOW VITAMIN D LEVEL: Status: ACTIVE | Noted: 2024-03-06

## 2024-03-11 ENCOUNTER — OFFICE VISIT (OUTPATIENT)
Dept: INTERNAL MEDICINE | Facility: CLINIC | Age: 89
End: 2024-03-11
Payer: MEDICARE

## 2024-03-11 VITALS
BODY MASS INDEX: 29.6 KG/M2 | HEIGHT: 67 IN | RESPIRATION RATE: 20 BRPM | DIASTOLIC BLOOD PRESSURE: 74 MMHG | SYSTOLIC BLOOD PRESSURE: 138 MMHG | HEART RATE: 70 BPM

## 2024-03-11 DIAGNOSIS — I27.20 PULMONARY HYPERTENSION: ICD-10-CM

## 2024-03-11 DIAGNOSIS — I77.1 TORTUOUS AORTA: ICD-10-CM

## 2024-03-11 DIAGNOSIS — M1A.9XX0 CHRONIC GOUT WITHOUT TOPHUS, UNSPECIFIED CAUSE, UNSPECIFIED SITE: ICD-10-CM

## 2024-03-11 DIAGNOSIS — M81.0 AGE RELATED OSTEOPOROSIS, UNSPECIFIED PATHOLOGICAL FRACTURE PRESENCE: ICD-10-CM

## 2024-03-11 DIAGNOSIS — Z00.00 ENCOUNTER FOR PREVENTATIVE ADULT HEALTH CARE EXAMINATION: Primary | ICD-10-CM

## 2024-03-11 DIAGNOSIS — M16.0 PRIMARY OSTEOARTHRITIS OF BOTH HIPS: ICD-10-CM

## 2024-03-11 DIAGNOSIS — Z85.41 HISTORY OF CERVICAL CANCER: ICD-10-CM

## 2024-03-11 DIAGNOSIS — M47.26 OSTEOARTHRITIS OF SPINE WITH RADICULOPATHY, LUMBAR REGION: ICD-10-CM

## 2024-03-11 DIAGNOSIS — I70.0 AORTIC ATHEROSCLEROSIS: ICD-10-CM

## 2024-03-11 DIAGNOSIS — R79.89 LOW VITAMIN D LEVEL: ICD-10-CM

## 2024-03-11 DIAGNOSIS — H40.1121 PRIMARY OPEN-ANGLE GLAUCOMA, LEFT EYE, MILD STAGE: ICD-10-CM

## 2024-03-11 DIAGNOSIS — H40.1113 PRIMARY OPEN-ANGLE GLAUCOMA, RIGHT EYE, SEVERE STAGE: ICD-10-CM

## 2024-03-11 DIAGNOSIS — M17.0 PRIMARY OSTEOARTHRITIS OF BOTH KNEES: ICD-10-CM

## 2024-03-11 DIAGNOSIS — I10 ESSENTIAL HYPERTENSION: ICD-10-CM

## 2024-03-11 DIAGNOSIS — N18.31 STAGE 3A CHRONIC KIDNEY DISEASE: ICD-10-CM

## 2024-03-11 DIAGNOSIS — R41.3 MEMORY DEFICIT: ICD-10-CM

## 2024-03-11 DIAGNOSIS — J30.1 CHRONIC SEASONAL ALLERGIC RHINITIS DUE TO POLLEN: ICD-10-CM

## 2024-03-11 PROCEDURE — G0439 PPPS, SUBSEQ VISIT: HCPCS | Mod: ,,, | Performed by: NURSE PRACTITIONER

## 2024-03-11 PROCEDURE — 99999 PR PBB SHADOW E&M-EST. PATIENT-LVL IV: CPT | Mod: PBBFAC,,, | Performed by: NURSE PRACTITIONER

## 2024-03-11 PROCEDURE — 99214 OFFICE O/P EST MOD 30 MIN: CPT | Mod: PBBFAC | Performed by: NURSE PRACTITIONER

## 2024-03-11 NOTE — PATIENT INSTRUCTIONS
Counseling and Referral of Other Preventative  (Italic type indicates deductible and co-insurance are waived)    Patient Name: Henna Jorge  Today's Date: 3/11/2024    Health Maintenance       Date Due Completion Date    RSV Vaccine (Age 60+ and Pregnant patients) (1 - 1-dose 60+ series) Never done ---    Pneumococcal Vaccines (Age 65+) (1 of 1 - PCV) Never done ---    Shingles Vaccine (2 of 3) 08/20/2015 6/25/2015    Lipid Panel 05/26/2023 5/26/2022    Influenza Vaccine (1) Never done ---    COVID-19 Vaccine (4 - 2023-24 season) 09/01/2023 4/5/2022    TETANUS VACCINE 02/16/2027 2/16/2017 (Declined)    Override on 2/16/2017: Declined        Orders Placed This Encounter   Procedures    LIPID PANEL    Ambulatory referral/consult to Neurology     The following information is provided to all patients.  This information is to help you find resources for any of the problems found today that may be affecting your health:                  Living healthy guide: ms.gov    Understanding Diabetes: www.diabetes.org      Eating healthy: www.cdc.gov/healthyweight      [facility] home safety checklist: www.cdc.gov/steadi/patient.html      Agency on Aging: ms.gov    Alcoholics anonymous (AA): www.aa.org      Physical Activity: www.ashish.nih.gov/go2gghm      Tobacco use: ms.gov      Counseling and Referral of Other Preventative  (Italic type indicates deductible and co-insurance are waived)    Patient Name: Henna Jorge  Today's Date: 3/11/2024    Health Maintenance       Date Due Completion Date    RSV Vaccine (Age 60+ and Pregnant patients) (1 - 1-dose 60+ series) Never done ---    Pneumococcal Vaccines (Age 65+) (1 of 1 - PCV) Never done ---    Shingles Vaccine (2 of 3) 08/20/2015 6/25/2015    Lipid Panel 05/26/2023 5/26/2022    Influenza Vaccine (1) Never done ---    COVID-19 Vaccine (4 - 2023-24 season) 09/01/2023 4/5/2022    TETANUS VACCINE 02/16/2027 2/16/2017 (Declined)    Override on 2/16/2017: Declined        Orders Placed This  Encounter   Procedures    LIPID PANEL    Ambulatory referral/consult to Neurology     The following information is provided to all patients.  This information is to help you find resources for any of the problems found today that may be affecting your health:                  Living healthy guide: ms.gov    Understanding Diabetes: www.diabetes.org      Eating healthy: www.cdc.gov/healthyweight      CDC home safety checklist: www.cdc.gov/steadi/patient.html      Agency on Aging: ms.gov    Alcoholics anonymous (AA): www.aa.org      Physical Activity: www.ashish.nih.gov/fd4rcra      Tobacco use: ms.gov

## 2024-03-11 NOTE — PROGRESS NOTES
"  Henna Jorge presented for a  Medicare AWV and comprehensive Health Risk Assessment today. The following components were reviewed and updated:    Medical history  Family History  Social history  Allergies and Current Medications  Health Risk Assessment  Health Maintenance  Care Team         ** See Completed Assessments for Annual Wellness Visit within the encounter summary.**         The following assessments were completed:  Living Situation  CAGE  Depression Screening  Timed Get Up and Go  Whisper Test  Cognitive Function Screening  Nutrition Screening  ADL Screening  PAQ Screening      Opioid documentation:      Patient does not have a current opioid prescription.          Daughter, Clare, in with patient during her visit.    Vitals:    03/11/24 1114   BP: 138/74   BP Location: Right arm   Patient Position: Sitting   Pulse: 70   Resp: 20   Height:    Pain scale 5' 7" (1.702 m)    0- Hx knee pain     Body mass index is 29.6 kg/m².  Physical Exam  Constitutional:       General: She is not in acute distress.     Appearance: She is not ill-appearing or diaphoretic.   HENT:      Mouth/Throat:      Mouth: Mucous membranes are moist.   Eyes:      General:         Right eye: No discharge.         Left eye: No discharge.      Conjunctiva/sclera: Conjunctivae normal.   Cardiovascular:      Rate and Rhythm: Normal rate and regular rhythm.   Pulmonary:      Effort: Pulmonary effort is normal. No respiratory distress.      Breath sounds: Normal breath sounds.   Skin:     General: Skin is warm and dry.   Neurological:      Mental Status: She is alert and oriented to person, place, and time. Mental status is at baseline.   Psychiatric:         Mood and Affect: Mood normal.         Behavior: Behavior normal.         Thought Content: Thought content normal.         Judgment: Judgment normal.         Diagnoses and health risks identified today and associated recommendations/orders:    1. Encounter for preventative adult health " "care examination  Review for opioid screening: Patient does not have Rx for Opioids  Review for substance use disorder: Patient does not use substance per chart    Patient states she does not drink alcohol    I offered to discuss advanced care planning, including how to pick a person who would make decisions for you if you were unable to make them for yourself, called a health care power of , and what kind of decisions you might make such as use of life sustaining treatments such as ventilators and tube feeding when faced with a life limiting illness recorded on a living will that they will need to know. (How you want to be cared for as you near the end of your natural life)     X Patient is interested in learning more about how to make advanced directives.  I provided them paperwork and offered to discuss this with them.    2. Chronic gout without tophus, unspecified cause, unspecified site  Monitor  Follow up with PCP  Continue home allopurinol, colchicine    3. Pulmonary hypertension  Monitor  Follow up as directed    4. Aortic atherosclerosis, Tortuous aorta  Monitor  Follow up as directed  Blood pressure control   - LIPID PANEL; Future    5. Stage 3a chronic kidney disease  Monitor  Follow up as directed     6. Osteoarthritis of spine with radiculopathy, lumbar region, Primary osteoarthritis of both hips, Primary osteoarthritis of both knees  Monitor  Follow up as needed, directed   Fall precautions    7. Memory deficit- mild   Monitor  Patient failed clock test and only remembered 1-2/3 words on delayed recall after 2 attempts. Patient is oriented to person, place, and situation but not to date/ year.  Long discussion had with patient about her decline in memory. Encouraged patient to think about going to live somewhere where she will have some help. Patient was adamant that she "is fine. God will take care of me." She has stated she does not want to leave her home where she lives. Patient lives there " by herself. I was able to talk patient into a Neurology appointment for memory evaluation. Daughter, Clare with patient today at clinic appointment and witness conversation.   Follow up with PCP  -Ambulatory referral/consult to Neurology     8. Primary open-angle glaucoma, right eye, severe stage, Primary open-angle glaucoma, left eye, mild stage   Monitor  Follow up with Ophtho   Continue home cosopt, latanoprost    9. Age related osteoporosis, unspecified pathological fracture presence, Low vitamin D level  Monitor  Follow up with PCP  Continue home fosamax, vitamin D3    10. Chronic seasonal allergic rhinitis due to pollen  Monitor  Stable  Follow up with PCP    11. Essential hypertension  Monitor  Stable  Continue home hyzaar    12. History of cervical cancer   Monitor  Follow up as directed                             Provided Henna with a 5-10 year written screening schedule and personal prevention plan. Recommendations were developed using the USPSTF age appropriate recommendations. Education, counseling, and referrals were provided as needed. After Visit Summary printed and given to patient which includes a list of additional screenings\tests needed.    Follow up in about 1 year (around 3/11/2025) for Annual wellness visit.    FABIANO Green

## 2024-05-30 DIAGNOSIS — M81.0 AGE-RELATED OSTEOPOROSIS WITHOUT CURRENT PATHOLOGICAL FRACTURE: ICD-10-CM

## 2024-05-30 DIAGNOSIS — M10.9 ACUTE GOUT INVOLVING TOE, UNSPECIFIED CAUSE, UNSPECIFIED LATERALITY: ICD-10-CM

## 2024-05-30 RX ORDER — COLCHICINE 0.6 MG/1
0.6 TABLET ORAL 2 TIMES DAILY PRN
Qty: 30 TABLET | Refills: 2 | Status: SHIPPED | OUTPATIENT
Start: 2024-05-30

## 2024-05-30 RX ORDER — ALENDRONATE SODIUM 70 MG/1
70 TABLET ORAL
Qty: 12 TABLET | Refills: 0 | Status: SHIPPED | OUTPATIENT
Start: 2024-05-30

## 2024-05-30 NOTE — TELEPHONE ENCOUNTER
Care Due:                  Date            Visit Type   Department     Provider  --------------------------------------------------------------------------------                                EP -                              PRIMARY      [facility]  Mary Mainampati  Last Visit: 01-      [facility]   MEDICINE       Aric  Next Visit: None Scheduled  None         None Found                                                            Last  Test          Frequency    Reason                     Performed    Due Date  --------------------------------------------------------------------------------    Office Visit  12 months..  alendronate, allopurinoL,   01- 01-                             colchicine...............    CBC.........  12 months..  allopurinoL..............  12- 12-    CMP.........  12 months..  alendronate, allopurinoL,   12- 12-                             colchicine...............    Mg Level....  12 months..  alendronate..............  Not Found    Overdue    Phosphate...  12 months..  alendronate..............  Not Found    Overdue    Uric Acid...  12 months..  allopurinoL, colchicine..  01- 01-    Vitamin D...  12 months..  alendronate..............  Not Found    Overdue    [employer] Embedded Care Due Messages. Reference number: [id number].   5/30/2024 5:24:34 PM CDT

## 2024-05-30 NOTE — TELEPHONE ENCOUNTER
----- Message from Júnior Hankins sent at 5/30/2024  4:07 PM CDT -----  Contact: Clare(daughter)  Type:  RX Refill Request    Who Called: Clare  Refill or New Rx:Refill   RX Name and Strength:colchicine (COLCRYS) 0.6 mg tablet  How is the patient currently taking it? (ex. 1XDay):twice daily   Is this a 30 day or 90 day RX:90  Preferred Pharmacy with phone number:  [id number] - [place], [place] - [address] AT [facility] [address]  [address]  [place] [address]  Phone: [phone] Fax: [phone]  Local or Mail Order:Local   Ordering Provider:  Would the patient rather a call back or a response via [facility]? Lino Back   Best Call Back Number:[phone]  Additional Information:              Type:  RX Refill Request    Who Called: Clare  Refill or New Rx:Refill   RX Name and Strength:alendronate (FOSAMAX) 70 MG tablet  How is the patient currently taking it? (ex. 1XDay):every 7 days   Is this a 30 day or 90 day RX:30  Preferred Pharmacy with phone number:  [facility] #[id number] - [place], [place] - [address] AT [facility] [address]  [address]  [place] [address]  Phone: [phone] Fax: [phone]  Local or Mail Order:Local   Ordering Provider:  Would the patient rather a call back or a response via [facility]? Lino Back                         Type:  RX Refill Request    Who Called: Clare  Refill or New Rx:Refill   RX Name and Strength:allopurinoL (ZYLOPRIM) 300 MG tablet  How is the patient currently taking it? (ex. 1XDay):once daily   Is this a 30 day or 90 day RX:90  Preferred Pharmacy with phone number:  [id number] - [place], [place] - [address] AT [facility] [address]  [address]  [place] [address]  Phone: [phone] Fax: [phone]  Local or Mail Order:Local   Ordering Provider:  Would the patient rather a call back or a response via [facility]? Lino Back   Best Call Back Number:[phone]  Additional  Information:        Best Call Back Number:[phone]  Additional Information:                        Type:  RX Refill Request    Who Called: Clare  Refill or New Rx:Refill   RX Name and Strength:losartan-hydrochlorothiazide 100-25 mg (HYZAAR) 100-25 mg per tablet  How is the patient currently taking it? (ex. 1XDay):once daily   Is this a 30 day or 90 day RX:30  Preferred Pharmacy with phone number:  [id number] - [place], [place] - [address] AT [facility] OF [address]  [address]  [place] [address]  Phone: [phone] Fax: [phone]  Local or Mail Order:Local   Ordering Provider:  Would the patient rather a call back or a response via [facility]? [facility] Back   Best Call Back Number:[phone]  Additional Information:

## 2024-06-05 ENCOUNTER — PATIENT MESSAGE (OUTPATIENT)
Dept: INTERNAL MEDICINE | Facility: CLINIC | Age: 89
End: 2024-06-05
Payer: MEDICARE

## 2024-06-05 ENCOUNTER — OFFICE VISIT (OUTPATIENT)
Dept: PODIATRY | Facility: CLINIC | Age: 89
End: 2024-06-05
Payer: MEDICARE

## 2024-06-05 VITALS — BODY MASS INDEX: 29.65 KG/M2 | HEIGHT: 67 IN | WEIGHT: 188.94 LBS

## 2024-06-05 DIAGNOSIS — M1A.0790 IDIOPATHIC CHRONIC GOUT OF FOOT WITHOUT TOPHUS, UNSPECIFIED LATERALITY: ICD-10-CM

## 2024-06-05 DIAGNOSIS — B35.1 ONYCHOMYCOSIS: ICD-10-CM

## 2024-06-05 DIAGNOSIS — I10 ESSENTIAL HYPERTENSION: ICD-10-CM

## 2024-06-05 DIAGNOSIS — M79.674 PAIN DUE TO ONYCHOMYCOSIS OF TOENAILS OF BOTH FEET: Primary | ICD-10-CM

## 2024-06-05 DIAGNOSIS — M79.675 PAIN DUE TO ONYCHOMYCOSIS OF TOENAILS OF BOTH FEET: Primary | ICD-10-CM

## 2024-06-05 DIAGNOSIS — L60.2 NAIL DISORDER (ONYCHOGRYPHOSIS): ICD-10-CM

## 2024-06-05 DIAGNOSIS — B35.1 PAIN DUE TO ONYCHOMYCOSIS OF TOENAILS OF BOTH FEET: Primary | ICD-10-CM

## 2024-06-05 PROCEDURE — 11721 DEBRIDE NAIL 6 OR MORE: CPT | Mod: Q7,S$PBB,, | Performed by: PODIATRIST

## 2024-06-05 PROCEDURE — 99213 OFFICE O/P EST LOW 20 MIN: CPT | Mod: 25,S$PBB,, | Performed by: PODIATRIST

## 2024-06-05 PROCEDURE — 11721 DEBRIDE NAIL 6 OR MORE: CPT | Mod: Q8,PBBFAC | Performed by: PODIATRIST

## 2024-06-05 PROCEDURE — 99213 OFFICE O/P EST LOW 20 MIN: CPT | Mod: PBBFAC | Performed by: PODIATRIST

## 2024-06-05 PROCEDURE — 99999 PR PBB SHADOW E&M-EST. PATIENT-LVL III: CPT | Mod: PBBFAC,,, | Performed by: PODIATRIST

## 2024-06-05 NOTE — PROGRESS NOTES
PODIATRIC MEDICINE AND SURGERY   Mary Corbett MD    CHIEF COMPLAINT  Chief Complaint   Patient presents with    Nail Care     Coming in for nail care, pt rates pain 0/10 , pt is non-diabetic, pt last seen pcp Fabiola Dorado FNP 3/11/24         HPI    SUBJECTIVE: Henna Jorge is a 89 y.o. female who  has a past medical history of Arthritis, Back pain, Cancer, Disorder of kidney and ureter, Glaucoma, Gout, chronic, Hypertension, Osteopenia, and Osteoporosis. Henna presenting to podiatry clinic with complaint of thickened, discolored, and painful toenails. Pt states nails result in pain with enclosed shoe wear aggravated due to pressure  and/or with ambulation. They are unable to trim nails. They are requesting nail care for relief of painful symptoms. Patient has no further pedal complaints.      PMH  Past Medical History:   Diagnosis Date    Arthritis     Back pain     sometimes    Cancer     hx of cervical    Disorder of kidney and ureter     Glaucoma     Gout, chronic     Hypertension     Osteopenia     Osteoporosis      Patient Active Problem List   Diagnosis    Essential hypertension    Osteoporosis    Primary osteoarthritis of both knees    History of cervical cancer    Stage 3a chronic kidney disease    Chronic seasonal allergic rhinitis due to pollen    Osteoarthritis of spine with radiculopathy, lumbar region    Primary osteoarthritis of both hips    Primary open-angle glaucoma, right eye, severe stage    Primary open-angle glaucoma, left eye, mild stage    Elevated brain natriuretic peptide (BNP) level    Elevated troponin    Aortic atherosclerosis    Tortuous aorta    Pulmonary hypertension    Chronic gout without tophus    Memory deficit    Mild Right hip pain, S/p recent fall    Low vitamin D level       MEDS  Current Outpatient Medications on File Prior to Visit   Medication Sig Dispense Refill    alendronate (FOSAMAX) 70 MG tablet Take 1 tablet (70 mg total) by mouth every 7 days. 12  "tablet 0    allopurinoL (ZYLOPRIM) 300 MG tablet Take 1 tablet (300 mg total) by mouth once daily. 90 tablet 1    cholecalciferol, vitamin D3, 125 mcg (5,000 unit) Tab Take 5,000 Units by mouth once daily.      colchicine (COLCRYS) 0.6 mg tablet Take 1 tablet (0.6 mg total) by mouth 2 (two) times daily as needed (gout attack). TAKE 1 TABLET BY MOUTH TWICE DAILY AS NEEDED FOR GOUT 30 tablet 2    dorzolamide-timolol 2-0.5% (COSOPT) 2-0.5 % ophthalmic solution Place 1 drop into both eyes 2 (two) times daily.      latanoprost 0.005 % ophthalmic solution Place 1 drop into both eyes nightly.      losartan-hydrochlorothiazide 100-25 mg (HYZAAR) 100-25 mg per tablet Take 1 tablet by mouth once daily. 30 tablet 11     No current facility-administered medications on file prior to visit.       PSH     Past Surgical History:   Procedure Laterality Date    EYE SURGERY      glaucoma    TONSILLECTOMY      TOTAL ABDOMINAL HYSTERECTOMY          ALL  Review of patient's allergies indicates:  No Known Allergies    SOC     Social History     Tobacco Use    Smoking status: Former    Smokeless tobacco: Never   Substance Use Topics    Alcohol use: No    Drug use: No         Family HX      Family History   Problem Relation Name Age of Onset    Hypertension Mother      No Known Problems Father      Melanoma Neg Hx              REVIEW OF SYSTEMS  General: Denies any fever or chills  Chest: Denies shortness of breath, wheezing, coughing, or sputum production  Heart: Denies chest pain.  As noted above and per history of current illness above, otherwise negative in the remainder of the 14 systems.     PHYSICAL EXAM  Vitals:    06/05/24 1331   Weight: 85.7 kg (188 lb 15 oz)   Height: 5' 7" (1.702 m)   PainSc: 0-No pain       GEN:  This patient is well-developed, well-nourished and appears stated age, well-oriented to person, place and time, and cooperative and pleasant on today's visit.      LOWER EXTREMITY    VASCULAR  DP pedal pulse 2/4 RIGHT, " LEFT2/4   PT pedal pulse 2/4 RIGHT, LEFT2/4  Capillary refill time immediate to the toes.   Feet are warm to the touch. Skin temperature warm to warm from proximally to distally   There are no varicosities, telangiectasias noted to bilateral foot and ankle regions.   There are no ecchymoses noted to bilateral foot and ankle regions.   There is no gross lower extremity edema.    DERMATOLOGIC  Skin moist with healthy texture and turgor.  Thickened, dystrophic, elongated, discolored toenails with subungal debris 1,2, 3, 4, 5 RIGHT FOOT, 1, 2, 3, 4 , 5 LEFT FOOT   There are no open ulcerations, lacerations, or fissures to bilateral foot and ankle regions. There are no signs of infection as there is no erythema, no proximal-extending lymphangiitis, no fluctuance, or crepitus noted on palpation to bilateral foot and ankle regions.   There is no interdigital maceration.   There are hyperkeratotic lesions noted to feet.    NEUROLOGIC  Neurological sensation is grossly intact to all sites tested    ORTHOPEDIC/BIOMECHANICAL  No symptomatic structural abnormalities noted. Muscle strength is 5/5 for foot inverters, everters, plantarflexors, and dorsiflexors. Muscle tone is normal.   Inspection/palpation of bone, joints and muscles unremarkable.    ASSESSMENT  Pain due to onychomycosis of toenails of both feet    Onychomycosis    Nail disorder (onychogryphosis)              PLAN  -The patient was examined and evaulated. Patient was given verbal instructions on maintenance care for mycotic nails. The need for proper skin care in order to prevent infection and colonization in the nails was explained to the patient.    - The nails are painful with applied pressure and shoe gear. If a mycotic infection is left untreated, the infection could spread, causing marked limitation of ambulation, and/or make the patient prone to secondary infection. Failure to debride the nails at necessary intervals could likely cause recurrence of pain.    -I recommend Vicks vapor topically once daily to affected toenails. This is a natural antifungal regimen that will help in reduction of fungal load and will also soften the nail to allow for easier debridements.   -With patient's permission, the elongated onychomycotic toenails, as outlined in the physical examination, were sharply debrided with a double action nail nipper to their soft tissue attachment. If indicated, the nails were then smoothed down in thickness with an nuvia board to facilitate in further debridement removing all offending nail and subungual debris. Patient relates relief following the procedure.       Disclaimer: This note was partially prepared using a voice recognition system and is likely to have sound alike errors within the text.        No future appointments.      Report Electronically Signed By:     Marika Bean DPM   Podiatry  [facility]  6/5/2024

## 2024-06-06 RX ORDER — ALLOPURINOL 300 MG/1
300 TABLET ORAL DAILY
Qty: 90 TABLET | Refills: 0 | Status: SHIPPED | OUTPATIENT
Start: 2024-06-06

## 2024-06-06 RX ORDER — LOSARTAN POTASSIUM AND HYDROCHLOROTHIAZIDE 25; 100 MG/1; MG/1
1 TABLET ORAL DAILY
Qty: 20 TABLET | Refills: 0 | Status: SHIPPED | OUTPATIENT
Start: 2024-06-06 | End: 2024-06-10 | Stop reason: SDUPTHER

## 2024-06-06 NOTE — TELEPHONE ENCOUNTER
No care due was identified.  [facility] Embedded Care Due Messages. Reference number: [id number].   6/05/2024 8:26:39 PM CDT

## 2024-06-10 DIAGNOSIS — I10 ESSENTIAL HYPERTENSION: ICD-10-CM

## 2024-06-10 RX ORDER — LOSARTAN POTASSIUM AND HYDROCHLOROTHIAZIDE 25; 100 MG/1; MG/1
1 TABLET ORAL DAILY
Qty: 20 TABLET | Refills: 0 | Status: SHIPPED | OUTPATIENT
Start: 2024-06-10 | End: 2025-06-10

## 2024-06-10 NOTE — TELEPHONE ENCOUNTER
----- Message from Huyen Iraheta sent at 6/10/2024  2:55 PM CDT -----  Contact: daughter  Pt daughter Clare is asking for an return call in reference to questions she has about the prescription losartan-hydrochlorothiazide 100-25 mg (HYZAAR) 100-25 mg per tablet called in for pt ,please call back at [phone] Thx CJ

## 2024-06-10 NOTE — TELEPHONE ENCOUNTER
No care due was identified.  [facility] Embedded Care Due Messages. Reference number: [id number].   6/10/2024 3:34:30 PM CDT

## 2024-08-26 ENCOUNTER — OFFICE VISIT (OUTPATIENT)
Dept: INTERNAL MEDICINE | Facility: CLINIC | Age: 89
End: 2024-08-26
Payer: MEDICARE

## 2024-08-26 VITALS
HEIGHT: 67 IN | RESPIRATION RATE: 16 BRPM | BODY MASS INDEX: 28.2 KG/M2 | SYSTOLIC BLOOD PRESSURE: 122 MMHG | WEIGHT: 179.69 LBS | TEMPERATURE: 98 F | HEART RATE: 71 BPM | OXYGEN SATURATION: 99 % | DIASTOLIC BLOOD PRESSURE: 74 MMHG

## 2024-08-26 DIAGNOSIS — I10 ESSENTIAL HYPERTENSION: Primary | ICD-10-CM

## 2024-08-26 DIAGNOSIS — I70.0 AORTIC ATHEROSCLEROSIS: ICD-10-CM

## 2024-08-26 DIAGNOSIS — Z00.00 ENCOUNTER FOR PREVENTATIVE ADULT HEALTH CARE EXAMINATION: ICD-10-CM

## 2024-08-26 DIAGNOSIS — E55.9 VITAMIN D DEFICIENCY: ICD-10-CM

## 2024-08-26 DIAGNOSIS — N18.31 STAGE 3A CHRONIC KIDNEY DISEASE: ICD-10-CM

## 2024-08-26 DIAGNOSIS — I27.20 PULMONARY HYPERTENSION: ICD-10-CM

## 2024-08-26 DIAGNOSIS — M81.0 AGE RELATED OSTEOPOROSIS, UNSPECIFIED PATHOLOGICAL FRACTURE PRESENCE: ICD-10-CM

## 2024-08-26 DIAGNOSIS — M81.0 AGE-RELATED OSTEOPOROSIS WITHOUT CURRENT PATHOLOGICAL FRACTURE: ICD-10-CM

## 2024-08-26 DIAGNOSIS — M47.26 OSTEOARTHRITIS OF SPINE WITH RADICULOPATHY, LUMBAR REGION: ICD-10-CM

## 2024-08-26 DIAGNOSIS — M16.0 PRIMARY OSTEOARTHRITIS OF BOTH HIPS: ICD-10-CM

## 2024-08-26 DIAGNOSIS — M1A.0790 IDIOPATHIC CHRONIC GOUT OF FOOT WITHOUT TOPHUS, UNSPECIFIED LATERALITY: ICD-10-CM

## 2024-08-26 DIAGNOSIS — I77.1 TORTUOUS AORTA: ICD-10-CM

## 2024-08-26 DIAGNOSIS — J30.1 CHRONIC SEASONAL ALLERGIC RHINITIS DUE TO POLLEN: ICD-10-CM

## 2024-08-26 DIAGNOSIS — E79.0 HYPERURICEMIA: ICD-10-CM

## 2024-08-26 LAB
25(OH)D3+25(OH)D2 SERPL-MCNC: 24 NG/ML (ref 30–96)
ALBUMIN SERPL BCP-MCNC: 3.3 G/DL (ref 3.5–5.2)
ALP SERPL-CCNC: 79 U/L (ref 55–135)
ALT SERPL W/O P-5'-P-CCNC: 16 U/L (ref 10–44)
ANION GAP SERPL CALC-SCNC: 11 MMOL/L (ref 8–16)
AST SERPL-CCNC: 20 U/L (ref 10–40)
BASOPHILS # BLD AUTO: 0.03 K/UL (ref 0–0.2)
BASOPHILS NFR BLD: 0.4 % (ref 0–1.9)
BILIRUB SERPL-MCNC: 0.4 MG/DL (ref 0.1–1)
BUN SERPL-MCNC: 23 MG/DL (ref 10–30)
CALCIUM SERPL-MCNC: 9.3 MG/DL (ref 8.7–10.5)
CHLORIDE SERPL-SCNC: 106 MMOL/L (ref 95–110)
CHOLEST SERPL-MCNC: 154 MG/DL (ref 120–199)
CHOLEST SERPL-MCNC: 154 MG/DL (ref 120–199)
CHOLEST/HDLC SERPL: 2.7 {RATIO} (ref 2–5)
CHOLEST/HDLC SERPL: 2.7 {RATIO} (ref 2–5)
CO2 SERPL-SCNC: 24 MMOL/L (ref 23–29)
CREAT SERPL-MCNC: 1 MG/DL (ref 0.5–1.4)
DIFFERENTIAL METHOD BLD: ABNORMAL
EOSINOPHIL # BLD AUTO: 0.1 K/UL (ref 0–0.5)
EOSINOPHIL NFR BLD: 0.6 % (ref 0–8)
ERYTHROCYTE [DISTWIDTH] IN BLOOD BY AUTOMATED COUNT: 15.9 % (ref 11.5–14.5)
EST. GFR  (NO RACE VARIABLE): 53.2 ML/MIN/1.73 M^2
GLUCOSE SERPL-MCNC: 84 MG/DL (ref 70–110)
HCT VFR BLD AUTO: 38.4 % (ref 37–48.5)
HDLC SERPL-MCNC: 57 MG/DL (ref 40–75)
HDLC SERPL-MCNC: 57 MG/DL (ref 40–75)
HDLC SERPL: 37 % (ref 20–50)
HDLC SERPL: 37 % (ref 20–50)
HGB BLD-MCNC: 12 G/DL (ref 12–16)
IMM GRANULOCYTES # BLD AUTO: 0.03 K/UL (ref 0–0.04)
IMM GRANULOCYTES NFR BLD AUTO: 0.4 % (ref 0–0.5)
LDLC SERPL CALC-MCNC: 77.4 MG/DL (ref 63–159)
LDLC SERPL CALC-MCNC: 77.4 MG/DL (ref 63–159)
LYMPHOCYTES # BLD AUTO: 2.1 K/UL (ref 1–4.8)
LYMPHOCYTES NFR BLD: 25.9 % (ref 18–48)
MCH RBC QN AUTO: 26.7 PG (ref 27–31)
MCHC RBC AUTO-ENTMCNC: 31.3 G/DL (ref 32–36)
MCV RBC AUTO: 85 FL (ref 82–98)
MONOCYTES # BLD AUTO: 0.7 K/UL (ref 0.3–1)
MONOCYTES NFR BLD: 8.3 % (ref 4–15)
NEUTROPHILS # BLD AUTO: 5.3 K/UL (ref 1.8–7.7)
NEUTROPHILS NFR BLD: 64.4 % (ref 38–73)
NONHDLC SERPL-MCNC: 97 MG/DL
NONHDLC SERPL-MCNC: 97 MG/DL
NRBC BLD-RTO: 0 /100 WBC
PLATELET # BLD AUTO: 272 K/UL (ref 150–450)
PMV BLD AUTO: 11.3 FL (ref 9.2–12.9)
POTASSIUM SERPL-SCNC: 4.4 MMOL/L (ref 3.5–5.1)
PROT SERPL-MCNC: 6.9 G/DL (ref 6–8.4)
RBC # BLD AUTO: 4.5 M/UL (ref 4–5.4)
SODIUM SERPL-SCNC: 141 MMOL/L (ref 136–145)
TRIGL SERPL-MCNC: 98 MG/DL (ref 30–150)
TRIGL SERPL-MCNC: 98 MG/DL (ref 30–150)
TSH SERPL DL<=0.005 MIU/L-ACNC: 1.72 UIU/ML (ref 0.4–4)
URATE SERPL-MCNC: 3.2 MG/DL (ref 2.4–5.7)
WBC # BLD AUTO: 8.17 K/UL (ref 3.9–12.7)

## 2024-08-26 PROCEDURE — 80053 COMPREHEN METABOLIC PANEL: CPT | Performed by: FAMILY MEDICINE

## 2024-08-26 PROCEDURE — 99999 PR PBB SHADOW E&M-EST. PATIENT-LVL III: CPT | Mod: PBBFAC,,, | Performed by: FAMILY MEDICINE

## 2024-08-26 PROCEDURE — 80061 LIPID PANEL: CPT | Performed by: NURSE PRACTITIONER

## 2024-08-26 PROCEDURE — G2211 COMPLEX E/M VISIT ADD ON: HCPCS | Mod: S$PBB,,, | Performed by: FAMILY MEDICINE

## 2024-08-26 PROCEDURE — 99213 OFFICE O/P EST LOW 20 MIN: CPT | Mod: PBBFAC | Performed by: FAMILY MEDICINE

## 2024-08-26 PROCEDURE — 84443 ASSAY THYROID STIM HORMONE: CPT | Performed by: FAMILY MEDICINE

## 2024-08-26 PROCEDURE — 84550 ASSAY OF BLOOD/URIC ACID: CPT | Performed by: FAMILY MEDICINE

## 2024-08-26 PROCEDURE — 82306 VITAMIN D 25 HYDROXY: CPT | Performed by: FAMILY MEDICINE

## 2024-08-26 PROCEDURE — 85025 COMPLETE CBC W/AUTO DIFF WBC: CPT | Performed by: FAMILY MEDICINE

## 2024-08-26 PROCEDURE — 99214 OFFICE O/P EST MOD 30 MIN: CPT | Mod: S$PBB,,, | Performed by: FAMILY MEDICINE

## 2024-08-26 RX ORDER — LOSARTAN POTASSIUM AND HYDROCHLOROTHIAZIDE 25; 100 MG/1; MG/1
1 TABLET ORAL DAILY
Qty: 90 TABLET | Refills: 1 | Status: SHIPPED | OUTPATIENT
Start: 2024-08-26 | End: 2025-08-26

## 2024-08-26 RX ORDER — ALLOPURINOL 300 MG/1
300 TABLET ORAL DAILY
Qty: 90 TABLET | Refills: 1 | Status: SHIPPED | OUTPATIENT
Start: 2024-08-26

## 2024-08-26 RX ORDER — ALENDRONATE SODIUM 70 MG/1
70 TABLET ORAL
Qty: 12 TABLET | Refills: 1 | Status: SHIPPED | OUTPATIENT
Start: 2024-08-26

## 2024-08-26 NOTE — PROGRESS NOTES
"Subjective:       Patient ID: Henna Jorge is a 89 y.o. female.    Chief Complaint: Dizziness    89-year-old  female patient with Patient Active Problem List:     Essential hypertension     Osteoporosis     Primary osteoarthritis of both knees     History of cervical cancer     Stage 3a chronic kidney disease     Chronic seasonal allergic rhinitis due to pollen     Osteoarthritis of spine with radiculopathy, lumbar region     Primary osteoarthritis of both hips     Primary open-angle glaucoma, right eye, severe stage     Primary open-angle glaucoma, left eye, mild stage     Elevated brain natriuretic peptide (BNP) level     Elevated troponin     Aortic atherosclerosis     Tortuous aorta     Pulmonary hypertension     Chronic gout without tophus     Memory deficit     Mild Right hip pain, S/p recent fall     Low vitamin D level  Here for follow-up on chronic medical conditions and reports that she has been taking her medications regularly and requesting refills  Denies of any gout exacerbations and appetite has been stable, has been eating well  Denies of any dizziness      Review of Systems   Constitutional:  Negative for fatigue.   Eyes:  Negative for visual disturbance.   Respiratory:  Negative for shortness of breath.    Cardiovascular:  Negative for chest pain and leg swelling.   Gastrointestinal:  Negative for abdominal pain, nausea and vomiting.   Musculoskeletal:  Negative for myalgias.   Skin:  Negative for rash.   Neurological:  Negative for dizziness, syncope, light-headedness and headaches.   Psychiatric/Behavioral:  Negative for sleep disturbance.          /74 (BP Location: Right arm, Patient Position: Sitting, BP Method: Large (Manual))   Pulse 71   Temp 97.6 °F (36.4 °C) (Tympanic)   Resp 16   Ht 5' 7" (1.702 m)   Wt 81.5 kg (179 lb 10.8 oz)   SpO2 99%   BMI 28.14 kg/m²   Objective:      Physical Exam  Constitutional:       Appearance: She is well-developed.   HENT:      " Head: Normocephalic and atraumatic.   Cardiovascular:      Rate and Rhythm: Normal rate and regular rhythm.      Heart sounds: Normal heart sounds. No murmur heard.  Pulmonary:      Effort: Pulmonary effort is normal.      Breath sounds: Normal breath sounds. No wheezing.   Abdominal:      General: Bowel sounds are normal.      Palpations: Abdomen is soft.      Tenderness: There is no abdominal tenderness.   Skin:     General: Skin is warm and dry.      Findings: No rash.   Neurological:      Mental Status: She is alert and oriented to person, place, and time.   Psychiatric:         Mood and Affect: Mood normal.           Assessment/Plan:   1. Essential hypertension  -     Comprehensive Metabolic Panel; Future; Expected date: 08/26/2024  -     Lipid Panel; Future; Expected date: 08/26/2024  -     TSH; Future; Expected date: 08/26/2024  -     losartan-hydrochlorothiazide 100-25 mg (HYZAAR) 100-25 mg per tablet; Take 1 tablet by mouth once daily.  Dispense: 90 tablet; Refill: 1  Blood pressure is stable currently on losartan hydrochlorothiazide 100/25 mg, refill given today    2. Chronic seasonal allergic rhinitis due to pollen  -     CBC Auto Differential; Future; Expected date: 08/26/2024  Stable    3. Osteoarthritis of spine with radiculopathy, lumbar region  4. Primary osteoarthritis of both hips  Graded exercise regimen recommended    5. Age related osteoporosis, unspecified pathological fracture presence  Overview:  osteopenia    Orders:  -     alendronate (FOSAMAX) 70 MG tablet; Take 1 tablet (70 mg total) by mouth every 7 days.  Dispense: 12 tablet; Refill: 1  Currently taking Fosamax weekly and vitamin D3 5000 units daily    6. Aortic atherosclerosis  7. Pulmonary hypertension  Overview:  echo 12/2022   8. Tortuous aorta  Overview:  cxr 12/2022   Currently stable and asymptomatic    9. Hyperuricemia  -     Uric Acid; Future; Expected date: 08/26/2024  -     allopurinoL (ZYLOPRIM) 300 MG tablet; Take 1 tablet  (300 mg total) by mouth once daily.  Dispense: 90 tablet; Refill: 1  Currently taking allopurinol 300 mg daily    10. Vitamin D deficiency  -     CBC Auto Differential; Future; Expected date: 08/26/2024  -     Vitamin D; Future; Expected date: 08/26/2024  Taking vitamin D3 5000 units daily    11. Idiopathic chronic gout of foot without tophus, unspecified laterality  -     allopurinoL (ZYLOPRIM) 300 MG tablet; Take 1 tablet (300 mg total) by mouth once daily.  Dispense: 90 tablet; Refill: 1    12. Age-related osteoporosis without current pathological fracture  Overview:  osteopenia      13. Stage 3a chronic kidney disease  Encouraged to drink adequate fluids and avoid over-the-counter NSAIDs     Visit today included increased complexity associated with the care of the episodic problem  addressed and managing the longitudinal care of the patient due to the serious and/or complex managed problem(s) .     Patient was encouraged to consider getting RSV pneumonia shingles vaccination if interested outside pharmacy

## 2024-10-14 ENCOUNTER — OFFICE VISIT (OUTPATIENT)
Dept: PODIATRY | Facility: CLINIC | Age: 89
End: 2024-10-14
Payer: MEDICARE

## 2024-10-14 DIAGNOSIS — M79.674 PAIN DUE TO ONYCHOMYCOSIS OF TOENAILS OF BOTH FEET: Primary | ICD-10-CM

## 2024-10-14 DIAGNOSIS — B35.1 PAIN DUE TO ONYCHOMYCOSIS OF TOENAILS OF BOTH FEET: Primary | ICD-10-CM

## 2024-10-14 DIAGNOSIS — B35.1 ONYCHOMYCOSIS: ICD-10-CM

## 2024-10-14 DIAGNOSIS — M79.675 PAIN DUE TO ONYCHOMYCOSIS OF TOENAILS OF BOTH FEET: Primary | ICD-10-CM

## 2024-10-14 DIAGNOSIS — L60.2 NAIL DISORDER (ONYCHOGRYPHOSIS): ICD-10-CM

## 2024-10-14 PROCEDURE — 99212 OFFICE O/P EST SF 10 MIN: CPT | Mod: PBBFAC | Performed by: PODIATRIST

## 2024-10-14 PROCEDURE — 99213 OFFICE O/P EST LOW 20 MIN: CPT | Mod: 25,S$PBB,, | Performed by: PODIATRIST

## 2024-10-14 PROCEDURE — 11721 DEBRIDE NAIL 6 OR MORE: CPT | Mod: S$PBB,,, | Performed by: PODIATRIST

## 2024-10-14 PROCEDURE — 11721 DEBRIDE NAIL 6 OR MORE: CPT | Mod: PBBFAC | Performed by: PODIATRIST

## 2024-10-14 PROCEDURE — 99999 PR PBB SHADOW E&M-EST. PATIENT-LVL II: CPT | Mod: PBBFAC,,, | Performed by: PODIATRIST

## 2024-10-14 NOTE — PROGRESS NOTES
PODIATRIC MEDICINE AND SURGERY   Mary Corbett MD    CHIEF COMPLAINT  Chief Complaint   Patient presents with    Nail Care     Patient is here for 4 month nail care maintenance.          HPI    SUBJECTIVE: Henna Jorge is a 89 y.o. female who  has a past medical history of Arthritis, Back pain, Cancer, Disorder of kidney and ureter, Glaucoma, Gout, chronic, Hypertension, Osteopenia, and Osteoporosis. Henna presenting to podiatry clinic with complaint of thickened, discolored, and painful toenails. Pt states nails result in pain with enclosed shoe wear aggravated due to pressure  and/or with ambulation. They are unable to trim nails. They are requesting nail care for relief of painful symptoms. Patient has no further pedal complaints.      PMH  Past Medical History:   Diagnosis Date    Arthritis     Back pain     sometimes    Cancer     hx of cervical    Disorder of kidney and ureter     Glaucoma     Gout, chronic     Hypertension     Osteopenia     Osteoporosis      Patient Active Problem List   Diagnosis    Essential hypertension    Osteoporosis    Primary osteoarthritis of both knees    History of cervical cancer    Stage 3a chronic kidney disease    Chronic seasonal allergic rhinitis due to pollen    Osteoarthritis of spine with radiculopathy, lumbar region    Primary osteoarthritis of both hips    Primary open-angle glaucoma, right eye, severe stage    Primary open-angle glaucoma, left eye, mild stage    Elevated brain natriuretic peptide (BNP) level    Elevated troponin    Aortic atherosclerosis    Tortuous aorta    Pulmonary hypertension    Chronic gout without tophus    Memory deficit    Mild Right hip pain, S/p recent fall    Low vitamin D level       MEDS  Current Outpatient Medications on File Prior to Visit   Medication Sig Dispense Refill    alendronate (FOSAMAX) 70 MG tablet Take 1 tablet (70 mg total) by mouth every 7 days. 12 tablet 1    allopurinoL (ZYLOPRIM) 300 MG tablet Take 1 tablet  (300 mg total) by mouth once daily. 90 tablet 1    cholecalciferol, vitamin D3, 125 mcg (5,000 unit) Tab Take 5,000 Units by mouth once daily.      colchicine (COLCRYS) 0.6 mg tablet Take 1 tablet (0.6 mg total) by mouth 2 (two) times daily as needed (gout attack). TAKE 1 TABLET BY MOUTH TWICE DAILY AS NEEDED FOR GOUT 30 tablet 2    dorzolamide-timolol 2-0.5% (COSOPT) 2-0.5 % ophthalmic solution Place 1 drop into both eyes 2 (two) times daily.      latanoprost 0.005 % ophthalmic solution Place 1 drop into both eyes nightly.      losartan-hydrochlorothiazide 100-25 mg (HYZAAR) 100-25 mg per tablet Take 1 tablet by mouth once daily. 90 tablet 1     No current facility-administered medications on file prior to visit.       PSH     Past Surgical History:   Procedure Laterality Date    EYE SURGERY      glaucoma    TONSILLECTOMY      TOTAL ABDOMINAL HYSTERECTOMY          ALL  Review of patient's allergies indicates:  No Known Allergies    SOC     Social History     Tobacco Use    Smoking status: Former    Smokeless tobacco: Former   Substance Use Topics    Alcohol use: No    Drug use: No         Family HX      Family History   Problem Relation Name Age of Onset    Hypertension Mother      No Known Problems Father      Melanoma Neg Hx              REVIEW OF SYSTEMS  General: Denies any fever or chills  Chest: Denies shortness of breath, wheezing, coughing, or sputum production  Heart: Denies chest pain.  As noted above and per history of current illness above, otherwise negative in the remainder of the 14 systems.     PHYSICAL EXAM  Vitals:    10/14/24 1307   PainSc: 0-No pain       GEN:  This patient is well-developed, well-nourished and appears stated age, well-oriented to person, place and time, and cooperative and pleasant on today's visit.      LOWER EXTREMITY    VASCULAR  DP pedal pulse 2/4 RIGHT, LEFT2/4   PT pedal pulse 2/4 RIGHT, LEFT2/4  Capillary refill time immediate to the toes.   Feet are warm to the touch.  Skin temperature warm to warm from proximally to distally   There are no varicosities, telangiectasias noted to bilateral foot and ankle regions.   There are no ecchymoses noted to bilateral foot and ankle regions.   There is no gross lower extremity edema.    DERMATOLOGIC  Skin moist with healthy texture and turgor.  Thickened, dystrophic, elongated, discolored toenails with subungal debris 1,2, 3, 4, 5 RIGHT FOOT, 1, 2, 3, 4 , 5 LEFT FOOT   There are no open ulcerations, lacerations, or fissures to bilateral foot and ankle regions. There are no signs of infection as there is no erythema, no proximal-extending lymphangiitis, no fluctuance, or crepitus noted on palpation to bilateral foot and ankle regions.   There is no interdigital maceration.   There are hyperkeratotic lesions noted to feet.    NEUROLOGIC  Neurological sensation is grossly intact to all sites tested    ORTHOPEDIC/BIOMECHANICAL  No symptomatic structural abnormalities noted. Muscle strength is 5/5 for foot inverters, everters, plantarflexors, and dorsiflexors. Muscle tone is normal.   Inspection/palpation of bone, joints and muscles unremarkable.    ASSESSMENT  Pain due to onychomycosis of toenails of both feet    Onychomycosis    Nail disorder (onychogryphosis)                PLAN  -The patient was examined and evaulated. Patient was given verbal instructions on maintenance care for mycotic nails. The need for proper skin care in order to prevent infection and colonization in the nails was explained to the patient.    - The nails are painful with applied pressure and shoe gear. If a mycotic infection is left untreated, the infection could spread, causing marked limitation of ambulation, and/or make the patient prone to secondary infection. Failure to debride the nails at necessary intervals could likely cause recurrence of pain.   -I recommend Vicks vapor topically once daily to affected toenails. This is a natural antifungal regimen that will  help in reduction of fungal load and will also soften the nail to allow for easier debridements.   -With patient's permission, the elongated onychomycotic toenails, as outlined in the physical examination, were sharply debrided with a double action nail nipper to their soft tissue attachment. If indicated, the nails were then smoothed down in thickness with an nuvia board to facilitate in further debridement removing all offending nail and subungual debris. Patient relates relief following the procedure.       Disclaimer: This note was partially prepared using a voice recognition system and is likely to have sound alike errors within the text.        Future Appointments   Date Time Provider Department Center   2/19/2025 10:00 AM Marika Bean DPM [facility] POD [facility]         Report Electronically Signed By:     Marika Bean DPM   Podiatry  [facility]-   10/14/2024

## 2025-02-16 DIAGNOSIS — I10 ESSENTIAL HYPERTENSION: ICD-10-CM

## 2025-02-16 RX ORDER — LOSARTAN POTASSIUM AND HYDROCHLOROTHIAZIDE 25; 100 MG/1; MG/1
1 TABLET ORAL
Qty: 90 TABLET | Refills: 1 | Status: SHIPPED | OUTPATIENT
Start: 2025-02-16

## 2025-02-16 NOTE — TELEPHONE ENCOUNTER
Care Due:                  Date            Visit Type   Department     Provider  --------------------------------------------------------------------------------                                EP -                              PRIMARY      [facility] INTERNAL  Mary Mainampati  Last Visit: 08-      CARE ([facility])   MEDICINE       Aric  Next Visit: None Scheduled  None         None Found                                                            Last  Test          Frequency    Reason                     Performed    Due Date  --------------------------------------------------------------------------------    Mg Level....  12 months..  alendronate..............  Not Found    Overdue    Phosphate...  12 months..  alendronate..............  Not Found    Overdue    [employer] Embedded Care Due Messages. Reference number: [id number].   2/16/2025 1:17:17 AM CST

## 2025-02-16 NOTE — TELEPHONE ENCOUNTER
Refill Routing Note   Medication(s) are not appropriate for processing by [facility] for the following reason(s):        Drug-disease interaction: losartan-hydrochlorothiazide 100-25 mg and Chronic gout without tophus     [facility] action(s):  Defer     Requires labs : Yes             Appointments  past 12m or future 3m with PCP    Date Provider   Last Visit   8/26/2024 Mary Corbett MD   Next Visit   Visit date not found Mary Corbett MD   ED visits in past 90 days: 0        Note composed:5:39 PM 02/16/2025

## 2025-02-21 DIAGNOSIS — Z00.00 ENCOUNTER FOR MEDICARE ANNUAL WELLNESS EXAM: ICD-10-CM

## 2025-03-12 ENCOUNTER — OFFICE VISIT (OUTPATIENT)
Dept: PODIATRY | Facility: CLINIC | Age: OVER 89
End: 2025-03-12
Payer: MEDICARE

## 2025-03-12 VITALS — BODY MASS INDEX: 28.2 KG/M2 | HEIGHT: 67 IN | WEIGHT: 179.69 LBS

## 2025-03-12 DIAGNOSIS — M79.674 PAIN DUE TO ONYCHOMYCOSIS OF TOENAILS OF BOTH FEET: Primary | ICD-10-CM

## 2025-03-12 DIAGNOSIS — B35.1 ONYCHOMYCOSIS: ICD-10-CM

## 2025-03-12 DIAGNOSIS — B35.1 PAIN DUE TO ONYCHOMYCOSIS OF TOENAILS OF BOTH FEET: Primary | ICD-10-CM

## 2025-03-12 DIAGNOSIS — M79.675 PAIN DUE TO ONYCHOMYCOSIS OF TOENAILS OF BOTH FEET: Primary | ICD-10-CM

## 2025-03-12 DIAGNOSIS — L60.2 NAIL DISORDER (ONYCHOGRYPHOSIS): ICD-10-CM

## 2025-03-12 PROCEDURE — 99213 OFFICE O/P EST LOW 20 MIN: CPT | Mod: PBBFAC | Performed by: PODIATRIST

## 2025-03-12 PROCEDURE — 11721 DEBRIDE NAIL 6 OR MORE: CPT | Mod: PBBFAC | Performed by: PODIATRIST

## 2025-03-12 PROCEDURE — 99999 PR PBB SHADOW E&M-EST. PATIENT-LVL III: CPT | Mod: PBBFAC,,, | Performed by: PODIATRIST

## 2025-03-12 NOTE — PROGRESS NOTES
PODIATRIC MEDICINE AND SURGERY   Mary Corbett MD    CHIEF COMPLAINT  Chief Complaint   Patient presents with    Nail Care     4 month [facility], non-diabetic, wears tennis and socks, last seen PCP Dr. Corbett on 08/26/2024         HPI    SUBJECTIVE: Henna Jorge is a 89 y.o. female who  has a past medical history of Arthritis, Back pain, Cancer, Disorder of kidney and ureter, Glaucoma, Gout, chronic, Hypertension, Osteopenia, and Osteoporosis. Henna presenting to podiatry clinic with complaint of thickened, discolored, and painful toenails. Pt states nails result in pain with enclosed shoe wear aggravated due to pressure  and/or with ambulation. They are unable to trim nails. They are requesting nail care for relief of painful symptoms. Patient has no further pedal complaints.      PMH  Past Medical History:   Diagnosis Date    Arthritis     Back pain     sometimes    Cancer     hx of cervical    Disorder of kidney and ureter     Glaucoma     Gout, chronic     Hypertension     Osteopenia     Osteoporosis      Patient Active Problem List   Diagnosis    Essential hypertension    Osteoporosis    Primary osteoarthritis of both knees    History of cervical cancer    Stage 3a chronic kidney disease    Chronic seasonal allergic rhinitis due to pollen    Osteoarthritis of spine with radiculopathy, lumbar region    Primary osteoarthritis of both hips    Primary open-angle glaucoma, right eye, severe stage    Primary open-angle glaucoma, left eye, mild stage    Elevated brain natriuretic peptide (BNP) level    Elevated troponin    Aortic atherosclerosis    Tortuous aorta    Pulmonary hypertension    Chronic gout without tophus    Memory deficit    Mild Right hip pain, S/p recent fall    Low vitamin D level       MEDS  Current Outpatient Medications on File Prior to Visit   Medication Sig Dispense Refill    alendronate (FOSAMAX) 70 MG tablet Take 1 tablet (70 mg total) by mouth every 7 days. 12 tablet 1    allopurinoL  "(ZYLOPRIM) 300 MG tablet Take 1 tablet (300 mg total) by mouth once daily. 90 tablet 1    cholecalciferol, vitamin D3, 125 mcg (5,000 unit) Tab Take 5,000 Units by mouth once daily.      colchicine (COLCRYS) 0.6 mg tablet Take 1 tablet (0.6 mg total) by mouth 2 (two) times daily as needed (gout attack). TAKE 1 TABLET BY MOUTH TWICE DAILY AS NEEDED FOR GOUT 30 tablet 2    dorzolamide-timolol 2-0.5% (COSOPT) 2-0.5 % ophthalmic solution Place 1 drop into both eyes 2 (two) times daily.      latanoprost 0.005 % ophthalmic solution Place 1 drop into both eyes nightly.      losartan-hydrochlorothiazide 100-25 mg (HYZAAR) 100-25 mg per tablet TAKE 1 TABLET BY MOUTH DAILY 90 tablet 1     No current facility-administered medications on file prior to visit.       PSH     Past Surgical History:   Procedure Laterality Date    EYE SURGERY      glaucoma    TONSILLECTOMY      TOTAL ABDOMINAL HYSTERECTOMY          ALL  Review of patient's allergies indicates:  No Known Allergies    SOC     Social History     Tobacco Use    Smoking status: Former    Smokeless tobacco: Former   Substance Use Topics    Alcohol use: No    Drug use: No         Family HX      Family History   Problem Relation Name Age of Onset    Hypertension Mother      No Known Problems Father      Melanoma Neg Hx              REVIEW OF SYSTEMS  General: Denies any fever or chills  Chest: Denies shortness of breath, wheezing, coughing, or sputum production  Heart: Denies chest pain.  As noted above and per history of current illness above, otherwise negative in the remainder of the 14 systems.     PHYSICAL EXAM  Vitals:    03/12/25 1116   Weight: 81.5 kg (179 lb 10.8 oz)   Height: 5' 7" (1.702 m)   PainSc: 0-No pain       GEN:  This patient is well-developed, well-nourished and appears stated age, well-oriented to person, place and time, and cooperative and pleasant on today's visit.      LOWER EXTREMITY    VASCULAR  DP pedal pulse 2/4 RIGHT, LEFT2/4   PT pedal pulse " 2/4 RIGHT, LEFT2/4  Capillary refill time immediate to the toes.   Feet are warm to the touch. Skin temperature warm to warm from proximally to distally   There are no varicosities, telangiectasias noted to bilateral foot and ankle regions.   There are no ecchymoses noted to bilateral foot and ankle regions.   There is no gross lower extremity edema.    DERMATOLOGIC  Skin moist with healthy texture and turgor.  Thickened, dystrophic, elongated, discolored toenails with subungal debris 1,2, 3, 4, 5 RIGHT FOOT, 1, 2, 3, 4 , 5 LEFT FOOT   There are no open ulcerations, lacerations, or fissures to bilateral foot and ankle regions. There are no signs of infection as there is no erythema, no proximal-extending lymphangiitis, no fluctuance, or crepitus noted on palpation to bilateral foot and ankle regions.   There is no interdigital maceration.   There are hyperkeratotic lesions noted to feet.    NEUROLOGIC  Neurological sensation is grossly intact to all sites tested    ORTHOPEDIC/BIOMECHANICAL  No symptomatic structural abnormalities noted. Muscle strength is 5/5 for foot inverters, everters, plantarflexors, and dorsiflexors. Muscle tone is normal.   Inspection/palpation of bone, joints and muscles unremarkable.    ASSESSMENT  Pain due to onychomycosis of toenails of both feet    Onychomycosis    Nail disorder (onychogryphosis)      PLAN  -The patient was examined and evaulated. Patient was given verbal instructions on maintenance care for mycotic nails. The need for proper skin care in order to prevent infection and colonization in the nails was explained to the patient.    - The nails are painful with applied pressure and shoe gear. If a mycotic infection is left untreated, the infection could spread, causing marked limitation of ambulation, and/or make the patient prone to secondary infection. Failure to debride the nails at necessary intervals could likely cause recurrence of pain.   -I recommend Vicks vapor  topically once daily to affected toenails. This is a natural antifungal regimen that will help in reduction of fungal load and will also soften the nail to allow for easier debridements.   -With patient's permission, the elongated onychomycotic toenails, as outlined in the physical examination, were sharply debrided with a double action nail nipper to their soft tissue attachment. If indicated, the nails were then smoothed down in thickness with an nuvia board to facilitate in further debridement removing all offending nail and subungual debris. Patient relates relief following the procedure.       Disclaimer: This note was partially prepared using a voice recognition system and is likely to have sound alike errors within the text.        Future Appointments   Date Time Provider Department Center   8/13/2025 11:15 AM Marika Bean DPM [facility] POD [place]           Report Electronically Signed By:     Marika Bean DPM   Podiatry  [facility]-   3/13/2025

## 2025-07-23 ENCOUNTER — OFFICE VISIT (OUTPATIENT)
Dept: INTERNAL MEDICINE | Facility: CLINIC | Age: OVER 89
End: 2025-07-23
Payer: MEDICARE

## 2025-07-23 ENCOUNTER — LAB VISIT (OUTPATIENT)
Dept: LAB | Facility: HOSPITAL | Age: OVER 89
End: 2025-07-23
Attending: NURSE PRACTITIONER
Payer: MEDICARE

## 2025-07-23 VITALS
TEMPERATURE: 97 F | OXYGEN SATURATION: 98 % | BODY MASS INDEX: 28.3 KG/M2 | HEART RATE: 60 BPM | WEIGHT: 180.31 LBS | HEIGHT: 67 IN | SYSTOLIC BLOOD PRESSURE: 140 MMHG | DIASTOLIC BLOOD PRESSURE: 80 MMHG

## 2025-07-23 DIAGNOSIS — R60.0 BILATERAL LOWER EXTREMITY EDEMA: ICD-10-CM

## 2025-07-23 DIAGNOSIS — R60.0 BILATERAL LOWER EXTREMITY EDEMA: Primary | ICD-10-CM

## 2025-07-23 LAB — BNP SERPL-MCNC: 342 PG/ML (ref 0–99)

## 2025-07-23 PROCEDURE — 36415 COLL VENOUS BLD VENIPUNCTURE: CPT

## 2025-07-23 PROCEDURE — 99215 OFFICE O/P EST HI 40 MIN: CPT | Mod: S$PBB,,, | Performed by: NURSE PRACTITIONER

## 2025-07-23 PROCEDURE — 83880 ASSAY OF NATRIURETIC PEPTIDE: CPT

## 2025-07-23 PROCEDURE — 99214 OFFICE O/P EST MOD 30 MIN: CPT | Mod: PBBFAC | Performed by: NURSE PRACTITIONER

## 2025-07-23 PROCEDURE — 99999 PR PBB SHADOW E&M-EST. PATIENT-LVL IV: CPT | Mod: PBBFAC,,, | Performed by: NURSE PRACTITIONER

## 2025-07-23 PROCEDURE — G2211 COMPLEX E/M VISIT ADD ON: HCPCS | Mod: ,,, | Performed by: NURSE PRACTITIONER

## 2025-07-23 NOTE — PROGRESS NOTES
"  Patient ID: Henna Jorge is a 89 y.o. female.    Chief Complaint: Leg Swelling    History of Present Illness    CHIEF COMPLAINT:  Patient presents today for swelling in her left leg and foot.    LEFT LEG EDEMA:  She reports ongoing left leg and foot swelling that temporarily improved during a five-day home stay but recurred upon leaving. This is not a recurrent problem. She denies pain, describing it as only slightly sore, and denies tightness. Yesterday, her toes were more significantly swollen with a "bubble-like" appearance on top. She had no difficulty putting on shoes independently. No associated redness or rashes noted.    CARDIOVASCULAR:  She denies chest pain and shortness of breath.    MEDICATIONS:  She reports taking blood pressure medication, which includes a diuretic component, approximately 75% of the time, occasionally forgetting doses. She has been inconsistent with using pill organizers but understands the importance of systematic medication organization to improve compliance.    ACTIVITY AND MOBILITY:  She reports limited activity and mobility, with prolonged sitting periods and infrequent leg elevation. She avoids walking outside due to heat and experiences leg and knee pain which contributes to reduced mobility. She does not use assistive devices and denies falls.    DIET:  She acknowledges high salt content in foods like chips but does not actively monitor or restrict salt intake.    MEDICAL EQUIPMENT:  She recently purchased compression stockings but is not currently using them.      ROS:  General: -fever, -chills, -fatigue, -weight gain, -weight loss  Eyes: -vision changes, -redness, -discharge  ENT: -ear pain, -nasal congestion, -sore throat  Cardiovascular: -chest pain, -palpitations, +lower extremity edema  Respiratory: -cough, -shortness of breath  Gastrointestinal: -abdominal pain, -nausea, -vomiting, -diarrhea, -constipation, -blood in stool  Genitourinary: -dysuria, -hematuria, " -frequency  Musculoskeletal: +joint pain, -muscle pain, +limb pain  Skin: -rash, -lesion  Neurological: -headache, -dizziness, -numbness, -tingling  Psychiatric: -anxiety, -depression, -sleep difficulty         Physical Exam    General: No acute distress. Well-developed. Well-nourished.  Eyes: EOMI. Sclerae anicteric.  HENT: Normocephalic. Atraumatic. Nares patent. Moist oral mucosa.  Ears: Bilateral TMs clear. Bilateral EACs clear.  Cardiovascular: Regular rate. Regular rhythm. No murmurs. No rubs. No gallops. Normal S1, S2.  Respiratory: Normal respiratory effort. Clear to auscultation bilaterally. No rales. No rhonchi. No wheezing.  Abdomen: Soft. Non-tender. Non-distended. Normoactive bowel sounds.  Musculoskeletal: No  obvious deformity.  Extremities: Left leg edema. Bilateral lower extremity edema. L > R  Neurological: Alert & oriented x3. No slurred speech. Normal gait.  Psychiatric: Normal mood. Normal affect. Good insight. Good judgment.  Skin: Warm. Dry. No rash.         Assessment & Plan        PULMONARY HYPERTENSION:  - Ordered blood test to assess for signs of fluid overload or cardiac involvement.  - Determined need for further diagnostic testing to rule out blood clots and assess cardiac function.  - Patient currently denies chest pain or dyspnea with normal heart and lung sounds on exam.    ESSENTIAL HYPERTENSION:  - Evaluated the patient's current antihypertensive regimen, which includes a diuretic for fluid management.  - Continued current medication to be taken daily as prescribed.  - Assessed the impact of medication non-adherence on the patient's fluid status and hypertension management.    LOWER EXTREMITY EDEMA:  - Monitored bilateral lower extremity edema with more pronounced swelling in the left leg and foot, including significant digital swelling.  - Evaluation revealed no erythema, rash, or reported pain.  - Assessed for potential causes including medication non-adherence, fluid overload,  and possible deep vein thrombosis.  - Ordered ultrasound of bilateral lower extremities to rule out deep vein thrombosis and scheduled labs to evaluate for cardiac-related fluid overload.  - Ordered prescription-strength compression stockings and educated the patient on their benefits for managing edema.  - Instructed the patient to elevate legs when sitting for prolonged periods and to contact the office for information on compression stocking fitting locations.    MEDICATION ADHERENCE:  - Monitored medication adherence, noting occasional missed doses of antihypertensive medication containing a diuretic component.  - Emphasized the importance of daily adherence, particularly due to its diuretic effect on managing fluid retention.  - Advised the patient to use a pill organizer to ensure consistent daily intake of medications.    PHYSICAL ACTIVITY:  - Monitored the patient's activity level, noting prolonged sedentary periods contributing to lower extremity fluid accumulation.  - Instructed the patient to increase daily ambulation, focusing on indoor activity if outdoor conditions are unfavorable.  - Advised use of compression stockings in conjunction with increased physical activity to manage edema.    DIET AND EATING HABITS:  - Monitored the patient's dietary habits, noting consumption of high-sodium foods such as chips despite overall low salt intake.  - Discussed the impact of high sodium intake on fluid retention and recommended reducing sodium intake, particularly from high-sodium foods like chips, to help manage edema.    FOLLOW-UP:  - Follow up with PCP, Dr. Corbett, for routine check-up.         Chronic complex care done      This note was generated with the assistance of ambient listening technology. Verbal consent was obtained by the patient and accompanying visitor(s) for the recording of patient appointment to facilitate this note. I attest to having reviewed and edited the generated note for accuracy,  though some syntax or spelling errors may persist. Please contact the author of this note for any clarification.

## 2025-07-25 ENCOUNTER — HOSPITAL ENCOUNTER (OUTPATIENT)
Dept: RADIOLOGY | Facility: HOSPITAL | Age: OVER 89
Discharge: HOME | End: 2025-07-25
Attending: NURSE PRACTITIONER
Payer: MEDICARE

## 2025-07-25 DIAGNOSIS — R60.0 BILATERAL LOWER EXTREMITY EDEMA: ICD-10-CM

## 2025-07-25 PROCEDURE — 93970 EXTREMITY STUDY: CPT | Mod: 26,,, | Performed by: STUDENT IN AN ORGANIZED HEALTH CARE EDUCATION/TRAINING PROGRAM

## 2025-07-25 PROCEDURE — 93970 EXTREMITY STUDY: CPT | Mod: TC

## 2025-07-26 DIAGNOSIS — I10 ESSENTIAL HYPERTENSION: ICD-10-CM

## 2025-07-26 NOTE — TELEPHONE ENCOUNTER
Care Due:                  Date            Visit Type   Department     Provider  --------------------------------------------------------------------------------                                EP -                              PRIMARY      [facility]  Last Visit: 08-      [facility] ([facility])   [facility]                              EP -                              PRIMARY      [facility]  Next Visit: 09-      [facility] ([facility])   [facility] Corbett                                                            Last  Test          Frequency    Reason                     Performed    Due Date  --------------------------------------------------------------------------------    CBC.........  12 months..  allopurinoL..............  08- 08-    CMP.........  12 months..  alendronate, allopurinoL,   08- 08-                             colchicine,                             losartan-hydrochlorothiaz                             lonnie......................    Mg Level....  12 months..  alendronate..............  Not Found    Overdue    Phosphate...  12 months..  alendronate..............  Not Found    Overdue    Uric Acid...  12 months..  allopurinoL, colchicine..  08- 08-    [facility] Embedded Care Due Messages. Reference number: [id number].   7/26/2025 11:44:10 AM CDT

## 2025-07-28 RX ORDER — LOSARTAN POTASSIUM AND HYDROCHLOROTHIAZIDE 25; 100 MG/1; MG/1
1 TABLET ORAL
Qty: 90 TABLET | Refills: 0 | Status: SHIPPED | OUTPATIENT
Start: 2025-07-28

## 2025-07-28 NOTE — TELEPHONE ENCOUNTER
Refill Routing Note   Medication(s) are not appropriate for processing by [facility] for the following reason(s):        Required vitals abnormal    [facility] action(s):  Defer     Requires labs : Yes      Medication Therapy Plan: 07/23/25 (!) 140/80      Appointments  past 12m or future 3m with PCP    Date Provider   Last Visit   8/26/2024 Mary Corbett MD   Next Visit   9/9/2025 Mary Corbett MD   ED visits in past 90 days: 0        Note composed:7:32 PM 07/27/2025